# Patient Record
Sex: MALE | Race: WHITE | NOT HISPANIC OR LATINO | Employment: FULL TIME | RURAL
[De-identification: names, ages, dates, MRNs, and addresses within clinical notes are randomized per-mention and may not be internally consistent; named-entity substitution may affect disease eponyms.]

---

## 2017-04-07 ENCOUNTER — HOSPITAL ENCOUNTER (OUTPATIENT)
Dept: PHYSICAL THERAPY | Facility: HOSPITAL | Age: 47
Setting detail: RECURRING SERIES
Discharge: HOME | End: 2017-05-17
Attending: FAMILY MEDICINE | Admitting: FAMILY MEDICINE

## 2017-09-01 ENCOUNTER — HOSPITAL ENCOUNTER (OUTPATIENT)
Dept: CT IMAGING | Facility: HOSPITAL | Age: 47
Discharge: HOME | End: 2017-09-01
Attending: SURGERY | Admitting: SURGERY

## 2017-09-06 ENCOUNTER — HOSPITAL ENCOUNTER (OUTPATIENT)
Dept: PREADMISSION TESTING | Facility: HOSPITAL | Age: 47
Discharge: HOME | End: 2017-09-06
Attending: SURGERY | Admitting: SURGERY

## 2017-09-14 ENCOUNTER — HOSPITAL ENCOUNTER (OUTPATIENT)
Dept: PREOP | Facility: HOSPITAL | Age: 47
Setting detail: HOSPITAL OUTPATIENT SURGERY
Discharge: HOME | End: 2017-09-14
Attending: SURGERY | Admitting: SURGERY

## 2017-10-24 ENCOUNTER — HOSPITAL ENCOUNTER (OUTPATIENT)
Dept: ULTRASOUND IMAGING | Facility: HOSPITAL | Age: 47
Discharge: HOME | End: 2017-10-24
Attending: SURGERY | Admitting: SURGERY

## 2018-01-29 ENCOUNTER — CONVERSION ENCOUNTER (OUTPATIENT)
Dept: ORTHOPEDIC SURGERY | Facility: CLINIC | Age: 48
End: 2018-01-29

## 2018-02-14 ENCOUNTER — HOSPITAL ENCOUNTER (OUTPATIENT)
Dept: CT IMAGING | Facility: HOSPITAL | Age: 48
Discharge: HOME | End: 2018-02-14
Attending: SURGERY | Admitting: SURGERY

## 2018-02-28 ENCOUNTER — HOSPITAL ENCOUNTER (OUTPATIENT)
Dept: PREADMISSION TESTING | Facility: HOSPITAL | Age: 48
Discharge: HOME | End: 2018-02-28
Attending: SURGERY | Admitting: SURGERY

## 2018-02-28 LAB
ANION GAP SERPL CALC-SCNC: 11.1 MMOL/L (ref 10–20)
BASOPHILS # BLD AUTO: 0.1 10*3/UL (ref 0–0.2)
BASOPHILS NFR BLD AUTO: 1 % (ref 0–2)
BUN SERPL-MCNC: 14 MG/DL (ref 8–20)
BUN/CREAT SERPL: 17.5 (ref 6.2–20.3)
CALCIUM SERPL-MCNC: 9.3 MG/DL (ref 8.9–10.3)
CHLORIDE SERPL-SCNC: 100 MMOL/L (ref 101–111)
CONV CO2: 27 MMOL/L (ref 22–32)
CREAT UR-MCNC: 0.8 MG/DL (ref 0.7–1.2)
DIFFERENTIAL METHOD BLD: (no result)
EOSINOPHIL # BLD AUTO: 0.1 10*3/UL (ref 0–0.3)
EOSINOPHIL # BLD AUTO: 2 % (ref 0–3)
ERYTHROCYTE [DISTWIDTH] IN BLOOD BY AUTOMATED COUNT: 12.9 % (ref 11.5–14.5)
GLUCOSE SERPL-MCNC: 137 MG/DL (ref 65–99)
HCT VFR BLD AUTO: 44.7 % (ref 40–54)
HGB BLD-MCNC: 15.8 G/DL (ref 14–18)
LYMPHOCYTES # BLD AUTO: 2.3 10*3/UL (ref 0.8–4.8)
LYMPHOCYTES NFR BLD AUTO: 35 % (ref 18–42)
MCH RBC QN AUTO: 32.7 PG (ref 26–32)
MCHC RBC AUTO-ENTMCNC: 35.5 G/DL (ref 32–36)
MCV RBC AUTO: 92.2 FL (ref 80–94)
MONOCYTES # BLD AUTO: 0.7 10*3/UL (ref 0.1–1.3)
MONOCYTES NFR BLD AUTO: 11 % (ref 2–11)
NEUTROPHILS # BLD AUTO: 3.4 10*3/UL (ref 2.3–8.6)
NEUTROPHILS NFR BLD AUTO: 51 % (ref 50–75)
NRBC BLD AUTO-RTO: 0 /100{WBCS}
NRBC/RBC NFR BLD MANUAL: 0 10*3/UL
PLATELET # BLD AUTO: 142 10*3/UL (ref 150–450)
PMV BLD AUTO: 8.7 FL (ref 7.4–10.4)
POTASSIUM SERPL-SCNC: ABNORMAL MMOL/L (ref 3.6–5.1)
RBC # BLD AUTO: 4.84 10*6/UL (ref 4.6–6)
SODIUM SERPL-SCNC: 134 MMOL/L (ref 136–144)
WBC # BLD AUTO: 6.6 10*3/UL (ref 4.5–11.5)

## 2018-03-12 ENCOUNTER — HOSPITAL ENCOUNTER (OUTPATIENT)
Dept: OTHER | Facility: HOSPITAL | Age: 48
Discharge: HOME | End: 2018-03-12
Attending: SURGERY | Admitting: SURGERY

## 2018-07-06 ENCOUNTER — HOSPITAL ENCOUNTER (OUTPATIENT)
Dept: CT IMAGING | Facility: HOSPITAL | Age: 48
Discharge: HOME | End: 2018-07-06
Attending: SURGERY | Admitting: SURGERY

## 2018-08-29 ENCOUNTER — HOSPITAL ENCOUNTER (OUTPATIENT)
Dept: ORTHOPEDIC SURGERY | Facility: CLINIC | Age: 48
Discharge: HOME | End: 2018-08-29
Attending: PHYSICIAN ASSISTANT | Admitting: PHYSICIAN ASSISTANT

## 2018-08-31 ENCOUNTER — HOSPITAL ENCOUNTER (OUTPATIENT)
Dept: MRI IMAGING | Facility: HOSPITAL | Age: 48
Discharge: HOME | End: 2018-08-31
Attending: PHYSICIAN ASSISTANT | Admitting: PHYSICIAN ASSISTANT

## 2018-11-15 ENCOUNTER — HOSPITAL ENCOUNTER (OUTPATIENT)
Dept: ULTRASOUND IMAGING | Facility: HOSPITAL | Age: 48
Discharge: HOME | End: 2018-11-15
Attending: FAMILY MEDICINE | Admitting: FAMILY MEDICINE

## 2018-11-15 ENCOUNTER — HOSPITAL ENCOUNTER (OUTPATIENT)
Dept: FAMILY MEDICINE CLINIC | Facility: CLINIC | Age: 48
Setting detail: SPECIMEN
Discharge: HOME | End: 2018-11-15
Attending: FAMILY MEDICINE | Admitting: FAMILY MEDICINE

## 2018-11-15 LAB
ALBUMIN SERPL-MCNC: 4.2 G/DL (ref 3.5–4.8)
ALBUMIN/GLOB SERPL: 1.2 {RATIO} (ref 1–1.7)
ALP SERPL-CCNC: 63 IU/L (ref 32–91)
ALT SERPL-CCNC: 40 IU/L (ref 17–63)
ANION GAP SERPL CALC-SCNC: 14.2 MMOL/L (ref 10–20)
AST SERPL-CCNC: 40 IU/L (ref 15–41)
BILIRUB SERPL-MCNC: 0.9 MG/DL (ref 0.3–1.2)
BUN SERPL-MCNC: 11 MG/DL (ref 8–20)
BUN/CREAT SERPL: 13.8 (ref 6.2–20.3)
CALCIUM SERPL-MCNC: 9.7 MG/DL (ref 8.9–10.3)
CHLORIDE SERPL-SCNC: 99 MMOL/L (ref 101–111)
CONV ABS BANDS: 1.6 10*3/UL
CONV CO2: 25 MMOL/L (ref 22–32)
CONV TOTAL PROTEIN: 7.7 G/DL (ref 6.1–7.9)
CREAT UR-MCNC: 0.8 MG/DL (ref 0.7–1.2)
DIFFERENTIAL METHOD BLD: (no result)
ERYTHROCYTE [DISTWIDTH] IN BLOOD BY AUTOMATED COUNT: 12.6 % (ref 11.5–14.5)
GLOBULIN UR ELPH-MCNC: 3.5 G/DL (ref 2.5–3.8)
GLUCOSE SERPL-MCNC: 97 MG/DL (ref 65–99)
HCT VFR BLD AUTO: 44.7 % (ref 40–54)
HGB BLD-MCNC: 15.4 G/DL (ref 14–18)
LIPASE SERPL-CCNC: 25 U/L (ref 22–51)
LYMPHOCYTES # BLD AUTO: 2.8 10*3/UL (ref 0.8–4.8)
LYMPHOCYTES NFR BLD AUTO: 24 % (ref 18–42)
MCH RBC QN AUTO: 31.7 PG (ref 26–32)
MCHC RBC AUTO-ENTMCNC: 34.4 G/DL (ref 32–36)
MCV RBC AUTO: 92 FL (ref 80–94)
MONOCYTES # BLD AUTO: 0.8 10*3/UL (ref 0.1–1.3)
MONOCYTES NFR BLD AUTO: 7 % (ref 2–11)
NEUTROPHILS # BLD AUTO: 6.3 10*3/UL (ref 2.3–8.6)
NEUTROPHILS NFR BLD AUTO: 55 % (ref 50–75)
NEUTS BAND NFR BLD MANUAL: 14 % (ref 0–5)
PLATELET # BLD AUTO: 167 10*3/UL (ref 150–450)
PMV BLD AUTO: 10.1 FL (ref 7.4–10.4)
POTASSIUM SERPL-SCNC: 4.2 MMOL/L (ref 3.6–5.1)
RBC # BLD AUTO: 4.86 10*6/UL (ref 4.6–6)
SODIUM SERPL-SCNC: 134 MMOL/L (ref 136–144)
WBC # BLD AUTO: 11.5 10*3/UL (ref 4.5–11.5)

## 2018-12-17 ENCOUNTER — HOSPITAL ENCOUNTER (OUTPATIENT)
Dept: ORTHOPEDIC SURGERY | Facility: CLINIC | Age: 48
Discharge: HOME | End: 2018-12-17
Attending: PHYSICIAN ASSISTANT | Admitting: PHYSICIAN ASSISTANT

## 2019-01-16 ENCOUNTER — HOSPITAL ENCOUNTER (OUTPATIENT)
Dept: ORTHOPEDIC SURGERY | Facility: CLINIC | Age: 49
Discharge: HOME | End: 2019-01-16
Attending: ORTHOPAEDIC SURGERY | Admitting: ORTHOPAEDIC SURGERY

## 2019-03-26 ENCOUNTER — HOSPITAL ENCOUNTER (OUTPATIENT)
Dept: ORTHOPEDIC SURGERY | Facility: CLINIC | Age: 49
Discharge: HOME | End: 2019-03-26
Attending: ORTHOPAEDIC SURGERY | Admitting: ORTHOPAEDIC SURGERY

## 2019-09-17 ENCOUNTER — OFFICE VISIT (OUTPATIENT)
Dept: ORTHOPEDIC SURGERY | Facility: CLINIC | Age: 49
End: 2019-09-17

## 2019-09-17 VITALS
SYSTOLIC BLOOD PRESSURE: 133 MMHG | HEART RATE: 88 BPM | WEIGHT: 297.2 LBS | BODY MASS INDEX: 40.26 KG/M2 | HEIGHT: 72 IN | DIASTOLIC BLOOD PRESSURE: 81 MMHG

## 2019-09-17 DIAGNOSIS — M48.061 SPINAL STENOSIS, LUMBAR REGION, WITHOUT NEUROGENIC CLAUDICATION: Primary | ICD-10-CM

## 2019-09-17 DIAGNOSIS — M51.36 DDD (DEGENERATIVE DISC DISEASE), LUMBAR: ICD-10-CM

## 2019-09-17 PROCEDURE — 99213 OFFICE O/P EST LOW 20 MIN: CPT | Performed by: ORTHOPAEDIC SURGERY

## 2019-09-17 RX ORDER — BACLOFEN 10 MG/1
10 TABLET ORAL 2 TIMES DAILY
Qty: 60 TABLET | Refills: 0 | Status: SHIPPED | OUTPATIENT
Start: 2019-09-17 | End: 2019-09-24 | Stop reason: SDUPTHER

## 2019-09-17 RX ORDER — RANITIDINE 150 MG/1
1 CAPSULE ORAL EVERY 12 HOURS
COMMUNITY
Start: 2018-03-06 | End: 2020-01-03 | Stop reason: SDUPTHER

## 2019-09-17 RX ORDER — LISINOPRIL 10 MG/1
TABLET ORAL
Refills: 0 | COMMUNITY
Start: 2019-07-31 | End: 2020-02-10

## 2019-09-17 NOTE — PROGRESS NOTES
Visit Type: Follow Up  Referring Provider: No ref. provider found  Primary Care Provider: Billy Aldrich MD    Patient Name: Qasim Ch  Patient Age: 49 y.o.  Chief Complaint: had concerns including Follow-up of the Lumbar Spine (6 mon f/u).    Subjective   History of Present Illness: This patient is a pleasant 49-year-old gentleman status post L3-L5 fusion 10 months ago patient is complaining of axial lower back pain with radiation of pain to his right buttock patient denies any pain below his knees      Review of Systems   Musculoskeletal: Positive for back pain and myalgias.       The following portions of the patient's history were reviewed and updated as appropriate: allergies, current medications, past family history, past medical history, past social history, past surgical history and problem list.    Past Medical History:   Diagnosis Date   • Colon polyps    • Diverticulitis    • Fatty liver    • GERD (gastroesophageal reflux disease)    • Hyperlipidemia    • Hypertension    • Obesity    • WOLF (obstructive sleep apnea)    • Spondylolisthesis        Past Surgical History:   Procedure Laterality Date   • COLON SURGERY  2016    sigmoid   • HERNIA REPAIR  2017    lap left inguinal hernia repair, ventral hernia repair   • LUMBAR FUSION  2018    TLIF L3-L5 --Dr Jauregui   • TONSILLECTOMY         Vitals:    19 0935   BP: 133/81   Pulse: 88       There is no problem list on file for this patient.      Family History Summary:  family history is not on file.    Social History     Socioeconomic History   • Marital status:      Spouse name: Not on file   • Number of children: Not on file   • Years of education: Not on file   • Highest education level: Not on file   Tobacco Use   • Smoking status: Former Smoker     Packs/day: 0.50     Types: Cigarettes     Last attempt to quit: 2012     Years since quittin.7   Substance and Sexual Activity   • Alcohol use: Yes     Frequency:  Monthly or less     Drinks per session: 1 or 2     Binge frequency: Never   • Drug use: No   • Sexual activity: Defer         Current Outpatient Medications:   •  ACETAMINOPHEN EXTRA STRENGTH PO, TYLENOL CAPS, Disp: , Rfl:   •  lisinopril (PRINIVIL,ZESTRIL) 10 MG tablet, , Disp: , Rfl: 0  •  ranitidine (ZANTAC) 150 MG capsule, 1 capsule Every 12 (Twelve) Hours., Disp: , Rfl:   •  baclofen (LIORESAL) 10 MG tablet, Take 1 tablet by mouth 2 (Two) Times a Day., Disp: 60 tablet, Rfl: 0  •  diclofenac sodium (VOTAREN XR) 100 MG 24 hr tablet, Take 1 tablet by mouth Daily., Disp: 30 tablet, Rfl: 11    Allergies   Allergen Reactions   • Penicillin G Anaphylaxis           Objective   Physical Exam  Back Exam     Tenderness   The patient is experiencing tenderness in the lumbar.    Range of Motion   Extension:  50 abnormal   Flexion:  50 abnormal   Lateral bend right:  30 abnormal   Lateral bend left:  30 abnormal   Rotation right: 20   Rotation left: 20     Muscle Strength   The patient has normal back strength.    Reflexes   Patellar: normal  Achilles: normal  Biceps: normal    Other   Toe walk: normal  Heel walk: normal  Sensation: normal  Gait: normal               Impression and Plan: This patient is here today for follow-up patient is a status post L3-L5 transforaminal interbody fusion 10 months ago patient is having some residual axial lower back pain with radiation of pain to his right buttock my exam is negative for sacroiliac joint and hip pathology I am since patient obtain a CT scan of lumbar spine and I am going to give him Voltaren 100 mg daily and baclofen 10 mg twice daily would like to see this patient in my office after he is done with his CT scan.    Spinal stenosis, lumbar region, without neurogenic claudication [M48.061]     Orders Placed This Encounter   Procedures   • XR Spine Lumbar AP & Lateral     Scheduling Instructions:       [id number]      Lspine ap and lat      Low back pain, post op  12/3/2018 TLIF L3-L5     Order Specific Question:   Reason for Exam:     Answer:   low back pain   • CT Lumbar Spine Without Contrast     Standing Status:   Future     Standing Expiration Date:   9/17/2020               Art Jauregui MD  09/17/19  11:11 AM

## 2019-09-20 ENCOUNTER — HOSPITAL ENCOUNTER (OUTPATIENT)
Dept: CT IMAGING | Facility: HOSPITAL | Age: 49
Discharge: HOME | End: 2019-09-20
Admitting: ORTHOPAEDIC SURGERY

## 2019-09-20 DIAGNOSIS — M51.36 DDD (DEGENERATIVE DISC DISEASE), LUMBAR: ICD-10-CM

## 2019-09-20 DIAGNOSIS — M48.061 SPINAL STENOSIS, LUMBAR REGION, WITHOUT NEUROGENIC CLAUDICATION: ICD-10-CM

## 2019-09-20 PROCEDURE — 72131 CT LUMBAR SPINE W/O DYE: CPT

## 2019-09-24 ENCOUNTER — OFFICE VISIT (OUTPATIENT)
Dept: ORTHOPEDIC SURGERY | Facility: CLINIC | Age: 49
End: 2019-09-24

## 2019-09-24 VITALS
DIASTOLIC BLOOD PRESSURE: 86 MMHG | BODY MASS INDEX: 39.02 KG/M2 | HEIGHT: 73 IN | WEIGHT: 294.4 LBS | HEART RATE: 71 BPM | SYSTOLIC BLOOD PRESSURE: 132 MMHG

## 2019-09-24 DIAGNOSIS — M48.061 SPINAL STENOSIS, LUMBAR REGION, WITHOUT NEUROGENIC CLAUDICATION: Primary | ICD-10-CM

## 2019-09-24 DIAGNOSIS — Q76.49 SACRALIZATION: ICD-10-CM

## 2019-09-24 PROCEDURE — 99213 OFFICE O/P EST LOW 20 MIN: CPT | Performed by: PHYSICIAN ASSISTANT

## 2019-09-24 RX ORDER — BACLOFEN 10 MG/1
10 TABLET ORAL 2 TIMES DAILY
Qty: 60 TABLET | Refills: 3 | Status: SHIPPED | OUTPATIENT
Start: 2019-09-24 | End: 2020-01-03 | Stop reason: SDUPTHER

## 2019-09-26 NOTE — PROGRESS NOTES
"Orthopedic Spine Post Operative Note      Primary Care Provider: Billy Aldrich MD    Patient Name: Qasim Ch  Patient Age: 49 y.o.  Chief Complaint: had concerns including Follow-up and Pain of the Lumbar Spine.    History of Present Illness: Qasim Ch is a 49 y.o. year old male here 10 months status post tlif l3-l5. Doing well compared to pre op symptoms but still having some cramping and discomfort in right leg. Had a ct done for further eval here to review..    Imaging:  My interpretation of ct scan is solid fusioinl3-l5. sacrilization of l5 tp on S1.    Assessment: No diagnosis found.    Plan:  Refill baclofen. F/u in 3 months    Objective   /86 (BP Location: Left arm, Patient Position: Sitting, Cuff Size: Large Adult)   Pulse 71   Ht 185.4 cm (73\")   Wt 134 kg (294 lb 6.4 oz)   BMI 38.84 kg/m²   Physical Exam  CONSTITUTIONAL: well nourished, well-developed, alert, oriented, in no acute distress   COMMUNICATION AND VOICE: able to communicate normally, normal voice quality  HEAD: normocephalic, atraumatic, no tenderness,  FACE: appearance normal,  facial motion symmetric  CHEST/RESPIRATORY: normal respiratory effort   CARDIOVASCULAR: no cyanosis or edema   SKIN: Incision is well healed.no edema, induration, fluctuance  MUSCULOSKELETAL: body movement grossly normal, normal gait and station NEUROLOGICAL/PSYCHIATRIC: oriented appropriately for age, mood normal, affect appropriate      No diagnosis found.    No orders of the defined types were placed in this encounter.           "

## 2019-11-05 ENCOUNTER — TELEPHONE (OUTPATIENT)
Dept: FAMILY MEDICINE CLINIC | Facility: CLINIC | Age: 49
End: 2019-11-05

## 2019-11-05 DIAGNOSIS — Z00.00 ENCOUNTER FOR BLOOD TEST FOR ROUTINE GENERAL PHYSICAL EXAMINATION: Primary | ICD-10-CM

## 2019-11-05 DIAGNOSIS — Z12.5 PROSTATE CANCER SCREENING: ICD-10-CM

## 2019-11-05 NOTE — TELEPHONE ENCOUNTER
Pt is coming in fasting on 12/27/19 for fasting labs for physical. Please put lab order in. Thank you.

## 2019-11-08 ENCOUNTER — TELEPHONE (OUTPATIENT)
Dept: FAMILY MEDICINE CLINIC | Facility: CLINIC | Age: 49
End: 2019-11-08

## 2019-11-08 NOTE — TELEPHONE ENCOUNTER
Patient called stating that his blood sugar has been running high. Patient states that at the highest it was 290. Patient states that he found an old script for metformin and has been taking it. Patient states that today BS is 130. Patient states that he is almost out of the metformin, and is asking if he needs to come in for blood work or what would you suggest.

## 2019-11-10 NOTE — TELEPHONE ENCOUNTER
Get the dose of the metformen and we will calll it in  Tell him would want to see him in 3 months fasting and can do a long range sugar and other blood then, if his sugars are not doing better with trhe metformin, can see sooner

## 2019-11-14 NOTE — TELEPHONE ENCOUNTER
Patient called back stating that he has been taking METFORMIN 1000 MG bid. 1 in the morning and 1 night.

## 2019-11-26 ENCOUNTER — TELEPHONE (OUTPATIENT)
Dept: FAMILY MEDICINE CLINIC | Facility: CLINIC | Age: 49
End: 2019-11-26

## 2019-11-26 RX ORDER — OSELTAMIVIR PHOSPHATE 75 MG/1
75 CAPSULE ORAL DAILY
Qty: 10 CAPSULE | Refills: 0 | Status: SHIPPED | OUTPATIENT
Start: 2019-11-26 | End: 2019-12-06

## 2019-12-27 ENCOUNTER — LAB (OUTPATIENT)
Dept: FAMILY MEDICINE CLINIC | Facility: CLINIC | Age: 49
End: 2019-12-27

## 2019-12-27 DIAGNOSIS — Z00.00 ENCOUNTER FOR BLOOD TEST FOR ROUTINE GENERAL PHYSICAL EXAMINATION: ICD-10-CM

## 2019-12-27 DIAGNOSIS — Z12.5 PROSTATE CANCER SCREENING: ICD-10-CM

## 2019-12-27 LAB
ALBUMIN SERPL-MCNC: 4 G/DL (ref 3.5–5.2)
ALBUMIN/GLOB SERPL: 1.2 G/DL
ALP SERPL-CCNC: 46 U/L (ref 39–117)
ALT SERPL W P-5'-P-CCNC: 68 U/L (ref 1–41)
ANION GAP SERPL CALCULATED.3IONS-SCNC: 12.6 MMOL/L (ref 5–15)
ARTICHOKE IGE QN: 85 MG/DL (ref 0–100)
AST SERPL-CCNC: 58 U/L (ref 1–40)
BASOPHILS # BLD AUTO: 0.04 10*3/MM3 (ref 0–0.2)
BASOPHILS NFR BLD AUTO: 0.7 % (ref 0–1.5)
BILIRUB SERPL-MCNC: 0.4 MG/DL (ref 0.2–1.2)
BILIRUB UR QL STRIP: NEGATIVE
BUN BLD-MCNC: 20 MG/DL (ref 6–20)
BUN/CREAT SERPL: 22.7 (ref 7–25)
CALCIUM SPEC-SCNC: 9.4 MG/DL (ref 8.6–10.5)
CHLORIDE SERPL-SCNC: 100 MMOL/L (ref 98–107)
CHOLEST SERPL-MCNC: 179 MG/DL (ref 0–200)
CLARITY UR: CLEAR
CO2 SERPL-SCNC: 25.4 MMOL/L (ref 22–29)
COLOR UR: YELLOW
CREAT BLD-MCNC: 0.88 MG/DL (ref 0.76–1.27)
DEPRECATED RDW RBC AUTO: 43.7 FL (ref 37–54)
EOSINOPHIL # BLD AUTO: 0.07 10*3/MM3 (ref 0–0.4)
EOSINOPHIL NFR BLD AUTO: 1.2 % (ref 0.3–6.2)
ERYTHROCYTE [DISTWIDTH] IN BLOOD BY AUTOMATED COUNT: 13 % (ref 12.3–15.4)
GFR SERPL CREATININE-BSD FRML MDRD: 93 ML/MIN/1.73
GLOBULIN UR ELPH-MCNC: 3.4 GM/DL
GLUCOSE BLD-MCNC: 133 MG/DL (ref 65–99)
GLUCOSE UR STRIP-MCNC: NEGATIVE MG/DL
HCT VFR BLD AUTO: 39.7 % (ref 37.5–51)
HDLC SERPL-MCNC: 23 MG/DL (ref 40–60)
HGB BLD-MCNC: 13.9 G/DL (ref 13–17.7)
HGB UR QL STRIP.AUTO: NEGATIVE
IMM GRANULOCYTES # BLD AUTO: 0.02 10*3/MM3 (ref 0–0.05)
IMM GRANULOCYTES NFR BLD AUTO: 0.3 % (ref 0–0.5)
KETONES UR QL STRIP: NEGATIVE
LDLC SERPL CALC-MCNC: ABNORMAL MG/DL
LDLC/HDLC SERPL: ABNORMAL {RATIO}
LEUKOCYTE ESTERASE UR QL STRIP.AUTO: NEGATIVE
LYMPHOCYTES # BLD AUTO: 2.25 10*3/MM3 (ref 0.7–3.1)
LYMPHOCYTES NFR BLD AUTO: 38.9 % (ref 19.6–45.3)
MCH RBC QN AUTO: 32 PG (ref 26.6–33)
MCHC RBC AUTO-ENTMCNC: 35 G/DL (ref 31.5–35.7)
MCV RBC AUTO: 91.3 FL (ref 79–97)
MONOCYTES # BLD AUTO: 0.5 10*3/MM3 (ref 0.1–0.9)
MONOCYTES NFR BLD AUTO: 8.6 % (ref 5–12)
NEUTROPHILS # BLD AUTO: 2.91 10*3/MM3 (ref 1.7–7)
NEUTROPHILS NFR BLD AUTO: 50.3 % (ref 42.7–76)
NITRITE UR QL STRIP: NEGATIVE
NRBC BLD AUTO-RTO: 0 /100 WBC (ref 0–0.2)
PH UR STRIP.AUTO: 5.5 [PH] (ref 5–8)
PLATELET # BLD AUTO: 141 10*3/MM3 (ref 140–450)
PMV BLD AUTO: 11.6 FL (ref 6–12)
POTASSIUM BLD-SCNC: 4.3 MMOL/L (ref 3.5–5.2)
PROT SERPL-MCNC: 7.4 G/DL (ref 6–8.5)
PROT UR QL STRIP: NEGATIVE
PSA SERPL-MCNC: 0.49 NG/ML (ref 0–4)
RBC # BLD AUTO: 4.35 10*6/MM3 (ref 4.14–5.8)
SODIUM BLD-SCNC: 138 MMOL/L (ref 136–145)
SP GR UR STRIP: 1.02 (ref 1–1.03)
TRIGL SERPL-MCNC: 453 MG/DL (ref 0–150)
TSH SERPL DL<=0.05 MIU/L-ACNC: 0.43 UIU/ML (ref 0.27–4.2)
UROBILINOGEN UR QL STRIP: NORMAL
VLDLC SERPL-MCNC: ABNORMAL MG/DL
WBC NRBC COR # BLD: 5.79 10*3/MM3 (ref 3.4–10.8)

## 2019-12-27 PROCEDURE — 36415 COLL VENOUS BLD VENIPUNCTURE: CPT

## 2019-12-27 PROCEDURE — 85025 COMPLETE CBC W/AUTO DIFF WBC: CPT | Performed by: FAMILY MEDICINE

## 2019-12-27 PROCEDURE — 80061 LIPID PANEL: CPT | Performed by: FAMILY MEDICINE

## 2019-12-27 PROCEDURE — 81003 URINALYSIS AUTO W/O SCOPE: CPT | Performed by: FAMILY MEDICINE

## 2019-12-27 PROCEDURE — G0103 PSA SCREENING: HCPCS | Performed by: FAMILY MEDICINE

## 2019-12-27 PROCEDURE — 83721 ASSAY OF BLOOD LIPOPROTEIN: CPT

## 2019-12-27 PROCEDURE — 80053 COMPREHEN METABOLIC PANEL: CPT | Performed by: FAMILY MEDICINE

## 2019-12-27 PROCEDURE — 84443 ASSAY THYROID STIM HORMONE: CPT | Performed by: FAMILY MEDICINE

## 2020-01-03 ENCOUNTER — OFFICE VISIT (OUTPATIENT)
Dept: FAMILY MEDICINE CLINIC | Facility: CLINIC | Age: 50
End: 2020-01-03

## 2020-01-03 VITALS
WEIGHT: 301 LBS | BODY MASS INDEX: 39.71 KG/M2 | SYSTOLIC BLOOD PRESSURE: 120 MMHG | RESPIRATION RATE: 12 BRPM | HEART RATE: 103 BPM | DIASTOLIC BLOOD PRESSURE: 75 MMHG

## 2020-01-03 DIAGNOSIS — Z00.00 PREVENTATIVE HEALTH CARE: Primary | ICD-10-CM

## 2020-01-03 DIAGNOSIS — Z12.11 COLON CANCER SCREENING: ICD-10-CM

## 2020-01-03 DIAGNOSIS — M54.16 LUMBAR RADICULOPATHY: ICD-10-CM

## 2020-01-03 PROBLEM — K43.9 VENTRAL HERNIA WITHOUT OBSTRUCTION OR GANGRENE: Status: ACTIVE | Noted: 2018-02-14

## 2020-01-03 PROBLEM — M48.061 SPINAL STENOSIS OF LUMBAR REGION WITHOUT NEUROGENIC CLAUDICATION: Status: ACTIVE | Noted: 2018-09-05

## 2020-01-03 PROBLEM — E66.9 OBESITY: Status: ACTIVE | Noted: 2017-03-07

## 2020-01-03 PROBLEM — G47.33 OBSTRUCTIVE SLEEP APNEA SYNDROME: Status: ACTIVE | Noted: 2018-11-09

## 2020-01-03 PROCEDURE — 99396 PREV VISIT EST AGE 40-64: CPT | Performed by: FAMILY MEDICINE

## 2020-01-03 PROCEDURE — 83036 HEMOGLOBIN GLYCOSYLATED A1C: CPT | Performed by: FAMILY MEDICINE

## 2020-01-03 RX ORDER — RANITIDINE 150 MG/1
150 CAPSULE ORAL 2 TIMES DAILY
Qty: 180 CAPSULE | Refills: 3 | Status: SHIPPED | OUTPATIENT
Start: 2020-01-03 | End: 2020-05-05

## 2020-01-03 RX ORDER — BACLOFEN 10 MG/1
10 TABLET ORAL 2 TIMES DAILY
Qty: 60 TABLET | Refills: 11 | Status: SHIPPED | OUTPATIENT
Start: 2020-01-03 | End: 2020-09-02

## 2020-01-03 NOTE — PROGRESS NOTES
Rooming Tab(CC,VS,Pt Hx,Fall Screen)  Chief Complaint   Patient presents with   • Annual Exam       Subjective 49-year-old here for physical.  The patient is not exercising because he had fusion of L3-L4 and L5.  He continues to have low back pain going into his left leg which started after he actually was postop and was doing some kind of stretching.  Overall he is better however, prior to surgery his whole left leg was numb, now he has numbness in the second and third toe on the left foot and apparently the second toe on the right.  He was following Dr. Jauregui but now will be having to see someone else.  The patient is due for a colonoscopy.  He denies any issues with his bowel or bladder function.  He has no blood in stool or urine.  He has not been able to lose weight because he is not able to exercise and is not eating properly.  She has no complaints of chest pain dizziness or syncope.  He has no BARRY.    I have reviewed and updated his medications, medical history and problem list during today's office visit.     Patient Care Team:  Billy Aldrich MD as PCP - General    Problem List Tab  Medications Tab  Synopsis Tab  Chart Review Tab  Care Everywhere Tab  Immunizations Tab  Patient History Tab    Social History     Tobacco Use   • Smoking status: Former Smoker     Packs/day: 0.50     Years: 10.00     Pack years: 5.00     Types: Cigarettes     Last attempt to quit: 2012     Years since quittin.0   • Smokeless tobacco: Current User     Types: Snuff   • Tobacco comment: quit    Substance Use Topics   • Alcohol use: Yes     Frequency: Monthly or less     Drinks per session: 1 or 2     Binge frequency: Never       Review of Systems   Constitutional: Negative for chills, fatigue and fever.   HENT: Negative for nosebleeds.    Eyes: Negative for double vision.   Respiratory: Negative for chest tightness and shortness of breath.    Cardiovascular: Negative for chest pain and palpitations.   Gastrointestinal:  Negative for blood in stool.   Genitourinary: Negative for dysuria and hematuria.   Musculoskeletal: Positive for back pain.   Neurological: Negative for dizziness and syncope.   Psychiatric/Behavioral: Negative for depressed mood.       Objective     Rooming Tab(CC,VS,Pt Hx,Fall Screen)  /75   Pulse 103   Resp 12   Wt (!) 137 kg (301 lb)   BMI 39.71 kg/m²     Body mass index is 39.71 kg/m².    Physical Exam   Constitutional: He is oriented to person, place, and time. He appears well-developed and well-nourished. No distress.   Obese pleasant male who is no acute distress   HENT:   Head: Normocephalic and atraumatic.   Nose: Nose normal.   Mouth/Throat: Oropharynx is clear and moist.   Eyes: Pupils are equal, round, and reactive to light. Conjunctivae, EOM and lids are normal.   Neck: Trachea normal and normal range of motion. Neck supple. No JVD present. Carotid bruit is not present. No thyroid mass and no thyromegaly present.   No carotid bruits   Cardiovascular: Normal rate, regular rhythm, normal heart sounds and intact distal pulses.   Pulmonary/Chest: Effort normal and breath sounds normal.   Abdominal: Soft. Bowel sounds are normal. He exhibits no distension and no mass. There is no tenderness.   Obese   Genitourinary: Rectum normal and prostate normal.   Musculoskeletal:   No c/c/e   Neurological: He is alert and oriented to person, place, and time. No cranial nerve deficit.   Skin: Skin is warm and dry.   Psychiatric: He has a normal mood and affect. His speech is normal and behavior is normal. He is attentive.   Nursing note and vitals reviewed.       Statin Choice Calculator  Data Reviewed:               Lab Results   Component Value Date    BUN 20 12/27/2019    CREATININE 0.88 12/27/2019    EGFRIFNONA 93 12/27/2019     12/27/2019    K 4.3 12/27/2019     12/27/2019    CALCIUM 9.4 12/27/2019    ALBUMIN 4.00 12/27/2019    BILITOT 0.4 12/27/2019    ALKPHOS 46 12/27/2019    AST 58 (H)  12/27/2019    ALT 68 (H) 12/27/2019    TRIG 453 (H) 12/27/2019    HDL 23 (L) 12/27/2019    VLDL  12/27/2019      Comment:      Unable to calculate    LDL  12/27/2019      Comment:      Unable to calculate    LDL 85 12/27/2019    LDLHDL  12/27/2019      Comment:      Unable to calculate    WBC 5.79 12/27/2019    RBC 4.35 12/27/2019    HCT 39.7 12/27/2019    MCV 91.3 12/27/2019    MCH 32.0 12/27/2019    TSH 0.429 12/27/2019    PSA 0.492 12/27/2019      Assessment/Plan   Order Review Tab  Health Maintenance Tab  Patient Plan/Order Tab  Diagnoses and all orders for this visit:    1. Preventative health care (Primary)  Assessment & Plan:  Patient needs to start some type of regular aerobic exercise for cardiovascular health and weight loss.  Also muscle toning and core strengthening would be beneficial.  Nutritious diet high in fiber and high in vegetable with low carbohydrate and eating leaner cuts of meat and increased amounts of fish.  Immunizations were discussed with the patient.  Not chewing tobacco was discussed with the patient and his the detrimental effects on his health.    Orders:  -     Hemoglobin A1c    2. Lumbar radiculopathy  -     MRI Lumbar Spine Without Contrast; Future  -     Ambulatory Referral to Spine Surgery    3. Colon cancer screening  -     Ambulatory Referral For Screening Colonoscopy    Other orders  -     baclofen (LIORESAL) 10 MG tablet; Take 1 tablet by mouth 2 (Two) Times a Day.  Dispense: 60 tablet; Refill: 11  -     raNITIdine (ZANTAC) 150 MG capsule; Take 1 capsule by mouth 2 (Two) Times a Day.  Dispense: 180 capsule; Refill: 3      Wrapup Tab  Return in about 4 months (around 5/3/2020) for Recheck.

## 2020-01-04 LAB — HBA1C MFR BLD: 6.5 % (ref 3.5–5.6)

## 2020-01-06 NOTE — PATIENT INSTRUCTIONS
Mediterranean Diet  A Mediterranean diet refers to food and lifestyle choices that are based on the traditions of countries located on the [place]. This way of eating has been shown to help prevent certain conditions and improve outcomes for people who have chronic diseases, like kidney disease and heart disease.  What are tips for following this plan?  Lifestyle  · Cook and eat meals together with your family, when possible.  · Drink enough fluid to keep your urine clear or pale yellow.  · Be physically active every day. This includes:  ? Aerobic exercise like running or swimming.  ? Leisure activities like gardening, walking, or housework.  · Get 7-8 hours of sleep each night.  · If recommended by your health care provider, drink red wine in moderation. This means 1 glass a day for nonpregnant women and 2 glasses a day for men. A glass of wine equals 5 oz (150 mL).  Reading food labels    · Check the serving size of packaged foods. For foods such as rice and pasta, the serving size refers to the amount of cooked product, not dry.  · Check the total fat in packaged foods. Avoid foods that have saturated fat or trans fats.  · Check the ingredients list for added sugars, such as corn syrup.  Shopping  · At the grocery store, buy most of your food from the areas near the walls of the store. This includes:  ? Fresh fruits and vegetables (produce).  ? Grains, beans, nuts, and seeds. Some of these may be available in unpackaged forms or large amounts (in bulk).  ? Fresh seafood.  ? Poultry and eggs.  ? Low-fat dairy products.  · Buy whole ingredients instead of prepackaged foods.  · Buy fresh fruits and vegetables in-season from local farmers markets.  · Buy frozen fruits and vegetables in resealable bags.  · If you do not have access to quality fresh seafood, buy precooked frozen shrimp or canned fish, such as tuna, salmon, or sardines.  · Buy small amounts of raw or cooked vegetables, salads, or olives from  the deli or salad bar at your store.  · Stock your pantry so you always have certain foods on hand, such as olive oil, canned tuna, canned tomatoes, rice, pasta, and beans.  Cooking  · Cook foods with extra-virgin olive oil instead of using butter or other vegetable oils.  · Have meat as a side dish, and have vegetables or grains as your main dish. This means having meat in small portions or adding small amounts of meat to foods like pasta or stew.  · Use beans or vegetables instead of meat in common dishes like chili or lasagna.  · [place] with different cooking methods. Try roasting or broiling vegetables instead of steaming or sautéeing them.  · Add frozen vegetables to soups, stews, pasta, or rice.  · Add nuts or seeds for added healthy fat at each meal. You can add these to yogurt, salads, or vegetable dishes.  · Marinate fish or vegetables using olive oil, lemon juice, garlic, and fresh herbs.  Meal planning    · Plan to eat 1 vegetarian meal one day each week. Try to work up to 2 vegetarian meals, if possible.  · Eat seafood 2 or more times a week.  · Have healthy snacks readily available, such as:  ? Vegetable sticks with hummus.  ? Greek yogurt.  ? Fruit and nut trail mix.  · Eat balanced meals throughout the week. This includes:  ? Fruit: 2-3 servings a day  ? Vegetables: 4-5 servings a day  ? Low-fat dairy: 2 servings a day  ? Fish, poultry, or lean meat: 1 serving a day  ? Beans and legumes: 2 or more servings a week  ? Nuts and seeds: 1-2 servings a day  ? Whole grains: 6-8 servings a day  ? Extra-virgin olive oil: 3-4 servings a day  · Limit red meat and sweets to only a few servings a month  What are my food choices?  · Mediterranean diet  ? Recommended  ? Grains: Whole-grain pasta. Brown rice. Bulgar wheat. Polenta. Couscous. Whole-wheat bread. Oatmeal. Quinoa.  ? Vegetables: Artichokes. Beets. Broccoli. Cabbage. Carrots. Eggplant. Green beans. Chard. Kale. Spinach. Onions. Leeks. Peas. Squash.  Tomatoes. Peppers. Radishes.  ? Fruits: Apples. Apricots. Avocado. Berries. Bananas. Cherries. Dates. Figs. Grapes. Di. Melon. Oranges. Peaches. Plums. Pomegranate.  ? Meats and other protein foods: Beans. Almonds. Sunflower seeds. Pine nuts. Peanuts. Cod. [place]. Scallops. Shrimp. Tuna. Tilapia. Clams. Oysters. Eggs.  ? Dairy: Low-fat milk. Cheese. Greek yogurt.  ? Beverages: Water. Red wine. Herbal tea.  ? Fats and oils: Extra virgin olive oil. Avocado oil. Grape seed oil.  ? Sweets and desserts: Greek yogurt with honey. Baked apples. Poached pears. Trail mix.  ? Seasoning and other foods: Basil. Cilantro. Coriander. Cumin. Mint. Parsley. [place]. Rosemary. Tarragon. Garlic. Oregano. Thyme. Pepper. Balsalmic vinegar. Tahini. Hummus. Tomato sauce. Olives. Mushrooms.  ? Limit these  ? Grains: Prepackaged pasta or rice dishes. Prepackaged cereal with added sugar.  ? Vegetables: Deep fried potatoes (french fries).  ? Fruits: Fruit canned in syrup.  ? Meats and other protein foods: Beef. Pork. Lamb. Poultry with skin. Hot dogs. [place].  ? Dairy: Ice cream. Sour cream. Whole milk.  ? Beverages: Juice. Sugar-sweetened soft drinks. Beer. Liquor and spirits.  ? Fats and oils: Butter. Canola oil. Vegetable oil. Beef fat (tallow). Lard.  ? Sweets and desserts: Cookies. Cakes. Pies. Candy.  ? Seasoning and other foods: Mayonnaise. Premade sauces and marinades.  ? The items listed may not be a complete list. Talk with your dietitian about what dietary choices are right for you.  Summary  · The Mediterranean diet includes both food and lifestyle choices.  · Eat a variety of fresh fruits and vegetables, beans, nuts, seeds, and whole grains.  · Limit the amount of red meat and sweets that you eat.  · Talk with your health care provider about whether it is safe for you to drink red wine in moderation. This means 1 glass a day for nonpregnant women and 2 glasses a day for men. A glass of wine equals 5 oz (150 mL).  This information  is not intended to replace advice given to you by your health care provider. Make sure you discuss any questions you have with your health care provider.  Document Released: 08/10/2017 Document Revised: 09/12/2017 Document Reviewed: 08/10/2017  [employer] Interactive Patient Education © 2019 [employer]      Exercising to Stay Healthy  To become healthy and stay healthy, it is recommended that you do moderate-intensity and vigorous-intensity exercise. You can tell that you are exercising at a moderate intensity if your heart starts beating faster and you start breathing faster but can still hold a conversation. You can tell that you are exercising at a vigorous intensity if you are breathing much harder and faster and cannot hold a conversation while exercising.  Exercising regularly is important. It has many health benefits, such as:  · Improving overall fitness, flexibility, and endurance.  · Increasing bone density.  · Helping with weight control.  · Decreasing body fat.  · Increasing muscle strength.  · Reducing stress and tension.  · Improving overall health.  How often should I exercise?  Choose an activity that you enjoy, and set realistic goals. Your health care provider can help you make an activity plan that works for you.  Exercise regularly as told by your health care provider. This may include:  · Doing strength training two times a week, such as:  ? Lifting weights.  ? Using resistance bands.  ? Push-ups.  ? Sit-ups.  ? Yoga.  · Doing a certain intensity of exercise for a given amount of time. Choose from these options:  ? A total of 150 minutes of moderate-intensity exercise every week.  ? A total of 75 minutes of vigorous-intensity exercise every week.  ? A mix of moderate-intensity and vigorous-intensity exercise every week.  Children, pregnant women, people who have not exercised regularly, people who are overweight, and older adults may need to talk with a health care provider about what  activities are safe to do. If you have a medical condition, be sure to talk with your health care provider before you start a new exercise program.  What are some exercise ideas?  Moderate-intensity exercise ideas include:  · Walking 1 mile (1.6 km) in about 15 minutes.  · Biking.  · Hiking.  · Golfing.  · Dancing.  · Water aerobics.  Vigorous-intensity exercise ideas include:  · Walking 4.5 miles (7.2 km) or more in about 1 hour.  · Jogging or running 5 miles (8 km) in about 1 hour.  · Biking 10 miles (16.1 km) or more in about 1 hour.  · Lap swimming.  · Roller-skating or in-line skating.  · Cross-country skiing.  · Vigorous competitive sports, such as football, basketball, and soccer.  · Jumping rope.  · Aerobic dancing.  What are some everyday activities that can help me to get exercise?  · Yard work, such as:  ? Pushing a .  ? Raking and bagging leaves.  · Washing your car.  · Pushing a stroller.  · Shoveling snow.  · Gardening.  · Washing windows or floors.  How can I be more active in my day-to-day activities?  · Use stairs instead of an elevator.  · Take a walk during your lunch break.  · If you drive, park your car farther away from your work or school.  · If you take public transportation, get off one stop early and walk the rest of the way.  · Stand up or walk around during all of your indoor phone calls.  · Get up, stretch, and walk around every 30 minutes throughout the day.  · Enjoy exercise with a friend. Support to continue exercising will help you keep a regular routine of activity.  What guidelines can I follow while exercising?  · Before you start a new exercise program, talk with your health care provider.  · Do not exercise so much that you hurt yourself, feel dizzy, or get very short of breath.  · Wear comfortable clothes and wear shoes with good support.  · Drink plenty of water while you exercise to prevent dehydration or heat stroke.  · Work out until your breathing and your  heartbeat get faster.  Where to find more information  · [place] Department of Health and Human Services: www.hhs.gov  · Centers for Disease Control and Prevention (CDC): www.cdc.gov  Summary  · Exercising regularly is important. It will improve your overall fitness, flexibility, and endurance.  · Regular exercise also will improve your overall health. It can help you control your weight, reduce stress, and improve your bone density.  · Do not exercise so much that you hurt yourself, feel dizzy, or get very short of breath.  · Before you start a new exercise program, talk with your health care provider.  This information is not intended to replace advice given to you by your health care provider. Make sure you discuss any questions you have with your health care provider.  Document Released: 01/20/2012 Document Revised: 11/08/2018 Document Reviewed: 11/08/2018  [employer] Interactive Patient Education © 2019 [employer]

## 2020-01-06 NOTE — PROGRESS NOTES
Patient was notified and he wanted to let you know that stroke and heart disease does run in the family and said that if you wanted to put him on cholesterol medication that he is fine with it.

## 2020-01-06 NOTE — ASSESSMENT & PLAN NOTE
Patient needs to start some type of regular aerobic exercise for cardiovascular health and weight loss.  Also muscle toning and core strengthening would be beneficial.  Nutritious diet high in fiber and high in vegetable with low carbohydrate and eating leaner cuts of meat and increased amounts of fish.  Immunizations were discussed with the patient.  Not chewing tobacco was discussed with the patient and his the detrimental effects on his health.

## 2020-01-14 DIAGNOSIS — M54.16 LUMBAR RADICULOPATHY: ICD-10-CM

## 2020-02-10 RX ORDER — LISINOPRIL 10 MG/1
TABLET ORAL
Qty: 90 TABLET | Refills: 3 | Status: SHIPPED | OUTPATIENT
Start: 2020-02-10 | End: 2021-04-20 | Stop reason: SDUPTHER

## 2020-05-05 ENCOUNTER — OFFICE VISIT (OUTPATIENT)
Dept: FAMILY MEDICINE CLINIC | Facility: CLINIC | Age: 50
End: 2020-05-05

## 2020-05-05 DIAGNOSIS — I10 ESSENTIAL HYPERTENSION: ICD-10-CM

## 2020-05-05 DIAGNOSIS — E11.9 TYPE 2 DIABETES MELLITUS WITHOUT COMPLICATION, WITHOUT LONG-TERM CURRENT USE OF INSULIN (HCC): Primary | ICD-10-CM

## 2020-05-05 DIAGNOSIS — K21.9 GASTROESOPHAGEAL REFLUX DISEASE WITHOUT ESOPHAGITIS: ICD-10-CM

## 2020-05-05 PROCEDURE — 99442 PR PHYS/QHP TELEPHONE EVALUATION 11-20 MIN: CPT | Performed by: FAMILY MEDICINE

## 2020-05-05 RX ORDER — FAMOTIDINE 20 MG/1
20 TABLET, FILM COATED ORAL 2 TIMES DAILY
Qty: 180 TABLET | Refills: 3 | Status: SHIPPED | OUTPATIENT
Start: 2020-05-05 | End: 2020-11-25

## 2020-05-05 NOTE — ASSESSMENT & PLAN NOTE
Diabetes is improving with treatment.   Continue current treatment regimen.  Reminded to bring in blood sugar diary at next visit.  Dietary recommendations for ADA diet.  Regular aerobic exercise.  Discussed ways to avoid symptomatic hypoglycemia.  Discussed foot care.  Diabetes will be reassessed 4 months fasting.

## 2020-05-05 NOTE — ASSESSMENT & PLAN NOTE
Hypertension is improving with treatment.  Continue current treatment regimen.  Dietary sodium restriction.  Weight loss.  Regular aerobic exercise.  Continue current medications.  Blood pressure will be reassessed 4 months fasting.

## 2020-05-05 NOTE — PROGRESS NOTES
You have chosen to receive care through a telephone visit. Do you consent to use a telephone visit for your medical care today? Yes    Rooming Tab(CC,VS,Pt Hx,Fall Screen)  Chief Complaint   Patient presents with   • Diabetes   • Hypertension       Subjective 49-year-old here for a telephone visit secondary to COVID-19.  Patient has a history of diabetes mellitus and is checking his blood sugars daily and its run in the 120 range.  He has no hypoglycemia.  He is trying to get some exercise and trying to eat right and he did lose 10 pounds recently.  He checks his feet daily and sees the ophthalmologist yearly.  He has no hypoglycemia.  Patient has had quite a bit of low back pain but got a steroid injection which seemed to help.  He is supposed to get another one tomorrow and feels like it is beneficial.  Patient has hypertension and he has not checked his blood pressure.  He denies any chest pain dizziness or syncope.  Patient has a history of GERD and recently he is not been able to get his Zantac because of the shortage.  He is needing something to replace this.  He is going to run out pretty soon    I have reviewed and updated his medications, medical history and problem list during today's office visit.     Patient Care Team:  Billy Aldrich MD as PCP - General    Problem List Tab  Medications Tab  Synopsis Tab  Chart Review Tab  Care Everywhere Tab  Immunizations Tab  Patient History Tab    Social History     Tobacco Use   • Smoking status: Former Smoker     Packs/day: 0.50     Years: 10.00     Pack years: 5.00     Types: Cigarettes     Last attempt to quit: 2012     Years since quittin.3   • Smokeless tobacco: Current User     Types: Snuff   • Tobacco comment: quit    Substance Use Topics   • Alcohol use: Yes     Frequency: Monthly or less     Drinks per session: 1 or 2     Binge frequency: Never       Review of Systems   Constitutional: Negative for chills and fever.   HENT: Negative for nosebleeds.     Respiratory: Negative for chest tightness and shortness of breath.    Cardiovascular: Negative for chest pain and palpitations.   Gastrointestinal: Negative for blood in stool.   Musculoskeletal: Positive for back pain.   Neurological: Negative for dizziness and syncope.       Objective     Rooming Tab(CC,VS,Pt Hx,Fall Screen)  There were no vitals taken for this visit.    There is no height or weight on file to calculate BMI.    Physical Exam     Statin Choice Calculator  Data Reviewed:                   Assessment/Plan   Order Review Tab  Health Maintenance Tab  Patient Plan/Order Tab  Diagnoses and all orders for this visit:    1. Type 2 diabetes mellitus without complication, without long-term current use of insulin (CMS/[facility]) (Primary)  Assessment & Plan:  Diabetes is improving with treatment.   Continue current treatment regimen.  Reminded to bring in blood sugar diary at next visit.  Dietary recommendations for ADA diet.  Regular aerobic exercise.  Discussed ways to avoid symptomatic hypoglycemia.  Discussed foot care.  Diabetes will be reassessed 4 months fasting.      2. Essential hypertension  Assessment & Plan:  Hypertension is improving with treatment.  Continue current treatment regimen.  Dietary sodium restriction.  Weight loss.  Regular aerobic exercise.  Continue current medications.  Blood pressure will be reassessed 4 months fasting.      3. Gastroesophageal reflux disease without esophagitis  Assessment & Plan:  Discontinue Zantac as the patient is not able to get either way.  Start Pepcid 20 mg twice daily      Other orders  -     famotidine (Pepcid) 20 MG tablet; Take 1 tablet by mouth 2 (Two) Times a Day.  Dispense: 180 tablet; Refill: 3      Wrapup Tab  Return in about 3 months (around 8/5/2020) for Recheck.         11 minutes spent on telephone visit

## 2020-05-07 ENCOUNTER — TELEPHONE (OUTPATIENT)
Dept: FAMILY MEDICINE CLINIC | Facility: CLINIC | Age: 50
End: 2020-05-07

## 2020-05-07 NOTE — TELEPHONE ENCOUNTER
----- Message from Billy Aldrich MD sent at 5/5/2020  4:02 PM EDT -----  Schedule the patient a follow-up appointment in 3 months fasting

## 2020-05-07 NOTE — TELEPHONE ENCOUNTER
You had sent me two messages on this patient.  One to see in 4 months and this one to see in 3 months.  Which would you prefer?

## 2020-09-02 ENCOUNTER — OFFICE VISIT (OUTPATIENT)
Dept: FAMILY MEDICINE CLINIC | Facility: CLINIC | Age: 50
End: 2020-09-02

## 2020-09-02 ENCOUNTER — LAB (OUTPATIENT)
Dept: FAMILY MEDICINE CLINIC | Facility: CLINIC | Age: 50
End: 2020-09-02

## 2020-09-02 DIAGNOSIS — E78.1 HYPERTRIGLYCERIDEMIA: ICD-10-CM

## 2020-09-02 DIAGNOSIS — I10 ESSENTIAL HYPERTENSION: ICD-10-CM

## 2020-09-02 DIAGNOSIS — E11.9 TYPE 2 DIABETES MELLITUS WITHOUT COMPLICATION, WITHOUT LONG-TERM CURRENT USE OF INSULIN (HCC): Primary | ICD-10-CM

## 2020-09-02 DIAGNOSIS — D75.1 POLYCYTHEMIA: ICD-10-CM

## 2020-09-02 LAB
ALBUMIN SERPL-MCNC: 4.3 G/DL (ref 3.5–5.2)
ALBUMIN UR-MCNC: 3.4 MG/DL
ALBUMIN/GLOB SERPL: 1.3 G/DL
ALP SERPL-CCNC: 56 U/L (ref 39–117)
ALT SERPL W P-5'-P-CCNC: 61 U/L (ref 1–41)
ANION GAP SERPL CALCULATED.3IONS-SCNC: 9.1 MMOL/L (ref 5–15)
ARTICHOKE IGE QN: 87 MG/DL (ref 0–100)
AST SERPL-CCNC: 56 U/L (ref 1–40)
BASOPHILS # BLD AUTO: 0.03 10*3/MM3 (ref 0–0.2)
BASOPHILS NFR BLD AUTO: 0.4 % (ref 0–1.5)
BILIRUB SERPL-MCNC: 0.5 MG/DL (ref 0–1.2)
BUN SERPL-MCNC: 19 MG/DL (ref 6–20)
BUN/CREAT SERPL: 19.4 (ref 7–25)
CALCIUM SPEC-SCNC: 10.1 MG/DL (ref 8.6–10.5)
CHLORIDE SERPL-SCNC: 101 MMOL/L (ref 98–107)
CHOLEST SERPL-MCNC: 177 MG/DL (ref 0–200)
CO2 SERPL-SCNC: 25.9 MMOL/L (ref 22–29)
CREAT SERPL-MCNC: 0.98 MG/DL (ref 0.76–1.27)
CREAT UR-MCNC: 350.5 MG/DL
DEPRECATED RDW RBC AUTO: 43.7 FL (ref 37–54)
EOSINOPHIL # BLD AUTO: 0.17 10*3/MM3 (ref 0–0.4)
EOSINOPHIL NFR BLD AUTO: 2.5 % (ref 0.3–6.2)
ERYTHROCYTE [DISTWIDTH] IN BLOOD BY AUTOMATED COUNT: 12.4 % (ref 12.3–15.4)
GFR SERPL CREATININE-BSD FRML MDRD: 82 ML/MIN/1.73
GLOBULIN UR ELPH-MCNC: 3.3 GM/DL
GLUCOSE SERPL-MCNC: 103 MG/DL (ref 65–99)
HBA1C MFR BLD: 5.7 % (ref 3.5–5.6)
HCT VFR BLD AUTO: 45.4 % (ref 37.5–51)
HDLC SERPL-MCNC: 25 MG/DL (ref 40–60)
HGB BLD-MCNC: 15.4 G/DL (ref 13–17.7)
IMM GRANULOCYTES # BLD AUTO: 0.02 10*3/MM3 (ref 0–0.05)
IMM GRANULOCYTES NFR BLD AUTO: 0.3 % (ref 0–0.5)
LDLC SERPL CALC-MCNC: ABNORMAL MG/DL
LDLC/HDLC SERPL: ABNORMAL {RATIO}
LYMPHOCYTES # BLD AUTO: 2.11 10*3/MM3 (ref 0.7–3.1)
LYMPHOCYTES NFR BLD AUTO: 30.6 % (ref 19.6–45.3)
MCH RBC QN AUTO: 32.5 PG (ref 26.6–33)
MCHC RBC AUTO-ENTMCNC: 33.9 G/DL (ref 31.5–35.7)
MCV RBC AUTO: 95.8 FL (ref 79–97)
MICROALBUMIN/CREAT UR: 9.7 MG/G
MONOCYTES # BLD AUTO: 0.68 10*3/MM3 (ref 0.1–0.9)
MONOCYTES NFR BLD AUTO: 9.9 % (ref 5–12)
NEUTROPHILS NFR BLD AUTO: 3.89 10*3/MM3 (ref 1.7–7)
NEUTROPHILS NFR BLD AUTO: 56.3 % (ref 42.7–76)
NRBC BLD AUTO-RTO: 0 /100 WBC (ref 0–0.2)
PLATELET # BLD AUTO: 137 10*3/MM3 (ref 140–450)
PMV BLD AUTO: 11.6 FL (ref 6–12)
POTASSIUM SERPL-SCNC: 4.5 MMOL/L (ref 3.5–5.2)
PROT SERPL-MCNC: 7.6 G/DL (ref 6–8.5)
RBC # BLD AUTO: 4.74 10*6/MM3 (ref 4.14–5.8)
SODIUM SERPL-SCNC: 136 MMOL/L (ref 136–145)
TRIGL SERPL-MCNC: 430 MG/DL (ref 0–150)
VLDLC SERPL-MCNC: ABNORMAL MG/DL
WBC # BLD AUTO: 6.9 10*3/MM3 (ref 3.4–10.8)

## 2020-09-02 PROCEDURE — 99213 OFFICE O/P EST LOW 20 MIN: CPT | Performed by: FAMILY MEDICINE

## 2020-09-02 PROCEDURE — 83721 ASSAY OF BLOOD LIPOPROTEIN: CPT | Performed by: FAMILY MEDICINE

## 2020-09-02 PROCEDURE — 85025 COMPLETE CBC W/AUTO DIFF WBC: CPT | Performed by: FAMILY MEDICINE

## 2020-09-02 PROCEDURE — 80053 COMPREHEN METABOLIC PANEL: CPT | Performed by: FAMILY MEDICINE

## 2020-09-02 PROCEDURE — 82570 ASSAY OF URINE CREATININE: CPT | Performed by: FAMILY MEDICINE

## 2020-09-02 PROCEDURE — 82043 UR ALBUMIN QUANTITATIVE: CPT | Performed by: FAMILY MEDICINE

## 2020-09-02 PROCEDURE — 83036 HEMOGLOBIN GLYCOSYLATED A1C: CPT | Performed by: FAMILY MEDICINE

## 2020-09-02 PROCEDURE — 80061 LIPID PANEL: CPT | Performed by: FAMILY MEDICINE

## 2020-09-02 RX ORDER — GEMFIBROZIL 600 MG/1
600 TABLET, FILM COATED ORAL
Qty: 60 TABLET | Refills: 11 | Status: SHIPPED | OUTPATIENT
Start: 2020-09-02 | End: 2021-03-22 | Stop reason: ALTCHOICE

## 2020-09-02 RX ORDER — TIZANIDINE 4 MG/1
TABLET ORAL
Qty: 60 TABLET | Refills: 6 | Status: SHIPPED | OUTPATIENT
Start: 2020-09-02 | End: 2021-08-16 | Stop reason: SDUPTHER

## 2020-09-02 NOTE — PROGRESS NOTES
Rooming Tab(CC,VS,Pt Hx,Fall Screen)  Chief Complaint   Patient presents with   • Diabetes   • Hypertension       Subjective 50-year-old here for follow-up of his diabetes.  The patient checks his blood sugars periodically and they really run about 100 or better.  He has had no hypoglycemic events and he takes his metformin to thousand milligrams twice daily without problem.  He tries to eat better but his weight has not changed much.  He is not getting much in the way of exercise but he is active.  Patient had back surgery in the past saw Dr. Mei continues to have pain down his left leg.  He had 4 injections which helped a lot.  He did not go back to see Dr. mei because he was not available, so another physician of whom he cannot remember the name now and I do not have any records.  Either way his back is better although he does complaining complain of cramping in his left leg every night that goes into his foot and toe.  Is quite serious and despite baclofen, he continues to have the cramping.  He has tried 2 baclofen's, 20 mg, and this does not help either.  He drinks plenty of water and.    I have reviewed and updated his medications, medical history and problem list during today's office visit.     Patient Care Team:  Billy Aldrich MD as PCP - General    Problem List Tab  Medications Tab  Synopsis Tab  Chart Review Tab  Care Everywhere Tab  Immunizations Tab  Patient History Tab    Social History     Tobacco Use   • Smoking status: Former Smoker     Packs/day: 0.50     Years: 10.00     Pack years: 5.00     Types: Cigarettes     Last attempt to quit:      Years since quittin.6   • Smokeless tobacco: Current User     Types: Snuff   • Tobacco comment: quit    Substance Use Topics   • Alcohol use: Yes     Frequency: Monthly or less     Drinks per session: 1 or 2     Binge frequency: Never       Review of Systems   Constitutional: Negative for chills, fatigue and fever.   HENT: Negative for nosebleeds.     Eyes: Negative for double vision.   Respiratory: Negative for chest tightness and shortness of breath.    Cardiovascular: Negative for chest pain and palpitations.   Gastrointestinal: Negative for blood in stool.   Genitourinary: Negative for dysuria and hematuria.   Musculoskeletal:        Leg cramps   Neurological: Negative for dizziness and syncope.   Psychiatric/Behavioral: Negative for depressed mood.       Objective     Rooming Tab(CC,VS,Pt Hx,Fall Screen)  /86   Pulse 90   Temp 96.9 °F (36.1 °C)   Resp 12   Wt 133 kg (292 lb 3.2 oz)   BMI 38.55 kg/m²     Body mass index is 38.55 kg/m².    Physical Exam   Constitutional: He is oriented to person, place, and time. He appears well-developed and well-nourished. No distress.   Obese pleasant no acute distress   HENT:   Head: Normocephalic and atraumatic.   Nose: Nose normal.   Mouth/Throat: Oropharynx is clear and moist.   Eyes: Pupils are equal, round, and reactive to light. Conjunctivae, EOM and lids are normal.   Neck: Trachea normal and normal range of motion. Neck supple. No JVD present. Carotid bruit is not present. No thyroid mass and no thyromegaly present.   No carotid bruits   Cardiovascular: Normal rate, regular rhythm, normal heart sounds and intact distal pulses.   Pulmonary/Chest: Effort normal and breath sounds normal.   Musculoskeletal:   No c/c/e  Sensation intact, DP pulses palpable, no diabetic foot ulcers or skin breakdown   Neurological: He is alert and oriented to person, place, and time. No cranial nerve deficit.   Skin: Skin is warm and dry.   Psychiatric: He has a normal mood and affect. His speech is normal and behavior is normal. He is attentive.   Nursing note and vitals reviewed.       Statin Choice Calculator  Data Reviewed:               Lab Results   Component Value Date    BUN 19 09/02/2020    CREATININE 0.98 09/02/2020    EGFRIFNONA 82 09/02/2020     09/02/2020    K 4.5 09/02/2020     09/02/2020     CALCIUM 10.1 09/02/2020    ALBUMIN 4.30 09/02/2020    BILITOT 0.5 09/02/2020    ALKPHOS 56 09/02/2020    AST 56 (H) 09/02/2020    ALT 61 (H) 09/02/2020    TRIG 430 (H) 09/02/2020    HDL 25 (L) 09/02/2020    VLDL  09/02/2020      Comment:      Unable to calculate    LDL  09/02/2020      Comment:      Unable to calculate    LDL 87 09/02/2020    LDLHDL  09/02/2020      Comment:      Unable to calculate    MICROALBUR 3.4 09/02/2020    WBC 6.90 09/02/2020    RBC 4.74 09/02/2020    HCT 45.4 09/02/2020    MCV 95.8 09/02/2020    MCH 32.5 09/02/2020      Assessment/Plan   Order Review Tab  Health Maintenance Tab  Patient Plan/Order Tab  Diagnoses and all orders for this visit:    1. Type 2 diabetes mellitus without complication, without long-term current use of insulin (CMS/[facility]) (Primary)  Assessment & Plan:  Diabetes is improving with treatment.   Continue current treatment regimen.  Reminded to bring in blood sugar diary at next visit.  Dietary recommendations for ADA diet.  Regular aerobic exercise.  Discussed ways to avoid symptomatic hypoglycemia.  Discussed foot care.  Diabetes will be reassessed in 6 months.    Orders:  -     Microalbumin / Creatinine Urine Ratio - Urine, Clean Catch  -     Comprehensive Metabolic Panel  -     Hemoglobin A1c    2. Essential hypertension  Assessment & Plan:  Hypertension is improving with treatment.  Continue current treatment regimen.  Dietary sodium restriction.  Weight loss.  Regular aerobic exercise.  Continue current medications.  Blood pressure will be reassessed at the next regular appointment.      3. Hypertriglyceridemia  Assessment & Plan:  Lipid abnormalities are unchanged.  Pharmacotherapy as ordered.  Lipids will be reassessed 4 months.    Orders:  -     Lipid Panel  -     LDL Cholesterol, Direct; Future  -     LDL Cholesterol, Direct    4. Polycythemia  Assessment & Plan:  His hemoglobin is better at 15.4.  We will continue to monitor over time    Orders:  -     CBC &  Differential  -     CBC Auto Differential      Wrapup Tab  Return in about 6 months (around 3/2/2021) for Annual physical.

## 2020-09-03 VITALS
DIASTOLIC BLOOD PRESSURE: 86 MMHG | BODY MASS INDEX: 38.55 KG/M2 | TEMPERATURE: 96.9 F | SYSTOLIC BLOOD PRESSURE: 130 MMHG | RESPIRATION RATE: 12 BRPM | WEIGHT: 292.2 LBS | HEART RATE: 90 BPM

## 2020-09-03 NOTE — ASSESSMENT & PLAN NOTE
Diabetes is improving with treatment.   Continue current treatment regimen.  Reminded to bring in blood sugar diary at next visit.  Dietary recommendations for ADA diet.  Regular aerobic exercise.  Discussed ways to avoid symptomatic hypoglycemia.  Discussed foot care.  Diabetes will be reassessed in 6 months.

## 2020-09-03 NOTE — ASSESSMENT & PLAN NOTE
Lipid abnormalities are unchanged.  Pharmacotherapy as ordered.  Lipids will be reassessed 4 months.

## 2020-10-22 ENCOUNTER — TELEPHONE (OUTPATIENT)
Dept: FAMILY MEDICINE CLINIC | Facility: CLINIC | Age: 50
End: 2020-10-22

## 2020-10-22 NOTE — TELEPHONE ENCOUNTER
PATIENT SAID HE HAS HIS YEARLY APPT WITH DR. CHA BACK IN MARCH AND WAS GIVEN A REFERRAL TO HAVE A COLONOSCOPY IN [place]. PATIENT WOULD LIKE TO GO TO THE DOCTOR HE WENT TO 4-5 YEARS AGO FOR HIS LAST COLONOSCOPY, BUT HE DOES NOT REMEMBER THE DOCTOR'S NAME. HE WANTS TO KNOW IF WE HAVE THAT INFORMATION ON FILE AND IF DR. CHA WILL GIVE HIM A NEW REFERRAL. PLEASE ADVISE.       PATIENT CALL BACK: [phone]

## 2020-10-23 DIAGNOSIS — Z12.11 COLON CANCER SCREENING: Primary | ICD-10-CM

## 2020-11-24 ENCOUNTER — TELEPHONE (OUTPATIENT)
Dept: FAMILY MEDICINE CLINIC | Facility: CLINIC | Age: 50
End: 2020-11-24

## 2020-11-24 NOTE — TELEPHONE ENCOUNTER
PATIENT CALLED AGAIN WANTING TO ADD THAT HE HAS HAD PART OF HIS COLON REMOVED AND HAD DIVERTICULITIS BEFORE SO I WORRIED IT COULD BE RELATED.

## 2020-11-24 NOTE — TELEPHONE ENCOUNTER
PATIENT IS  WANTING TO KNOW WHAT TO DO. HE IS HAVING CHILLS/ AND HOT FLASHES AND DIARRHEA AND ABD PAIN. HE IS ASKING TO BE SEEN OR WANTS TO KNOW WHAT TO DO.    [phone]

## 2020-11-25 ENCOUNTER — OFFICE VISIT (OUTPATIENT)
Dept: FAMILY MEDICINE CLINIC | Facility: CLINIC | Age: 50
End: 2020-11-25

## 2020-11-25 ENCOUNTER — LAB (OUTPATIENT)
Dept: FAMILY MEDICINE CLINIC | Facility: CLINIC | Age: 50
End: 2020-11-25

## 2020-11-25 ENCOUNTER — TELEPHONE (OUTPATIENT)
Dept: FAMILY MEDICINE CLINIC | Facility: CLINIC | Age: 50
End: 2020-11-25

## 2020-11-25 VITALS
TEMPERATURE: 97.5 F | RESPIRATION RATE: 12 BRPM | DIASTOLIC BLOOD PRESSURE: 87 MMHG | BODY MASS INDEX: 36.89 KG/M2 | WEIGHT: 279.6 LBS | SYSTOLIC BLOOD PRESSURE: 124 MMHG | HEART RATE: 98 BPM

## 2020-11-25 DIAGNOSIS — R10.13 EPIGASTRIC PAIN: Primary | ICD-10-CM

## 2020-11-25 LAB
ALBUMIN SERPL-MCNC: 4.6 G/DL (ref 3.5–5.2)
ALBUMIN/GLOB SERPL: 1.2 G/DL
ALP SERPL-CCNC: 64 U/L (ref 39–117)
ALT SERPL W P-5'-P-CCNC: 31 U/L (ref 1–41)
ANION GAP SERPL CALCULATED.3IONS-SCNC: 10.5 MMOL/L (ref 5–15)
AST SERPL-CCNC: 43 U/L (ref 1–40)
BASOPHILS # BLD AUTO: 0.04 10*3/MM3 (ref 0–0.2)
BASOPHILS NFR BLD AUTO: 0.6 % (ref 0–1.5)
BILIRUB SERPL-MCNC: 0.5 MG/DL (ref 0–1.2)
BUN SERPL-MCNC: 14 MG/DL (ref 6–20)
BUN/CREAT SERPL: 18.2 (ref 7–25)
CALCIUM SPEC-SCNC: 10.1 MG/DL (ref 8.6–10.5)
CHLORIDE SERPL-SCNC: 100 MMOL/L (ref 98–107)
CO2 SERPL-SCNC: 25.5 MMOL/L (ref 22–29)
CREAT SERPL-MCNC: 0.77 MG/DL (ref 0.76–1.27)
DEPRECATED RDW RBC AUTO: 40.7 FL (ref 37–54)
EOSINOPHIL # BLD AUTO: 0.14 10*3/MM3 (ref 0–0.4)
EOSINOPHIL NFR BLD AUTO: 2 % (ref 0.3–6.2)
ERYTHROCYTE [DISTWIDTH] IN BLOOD BY AUTOMATED COUNT: 11.9 % (ref 12.3–15.4)
GFR SERPL CREATININE-BSD FRML MDRD: 108 ML/MIN/1.73
GLOBULIN UR ELPH-MCNC: 3.8 GM/DL
GLUCOSE SERPL-MCNC: 121 MG/DL (ref 65–99)
HCT VFR BLD AUTO: 44.3 % (ref 37.5–51)
HGB BLD-MCNC: 15.3 G/DL (ref 13–17.7)
IMM GRANULOCYTES # BLD AUTO: 0.03 10*3/MM3 (ref 0–0.05)
IMM GRANULOCYTES NFR BLD AUTO: 0.4 % (ref 0–0.5)
LIPASE SERPL-CCNC: 39 U/L (ref 13–60)
LYMPHOCYTES # BLD AUTO: 1.87 10*3/MM3 (ref 0.7–3.1)
LYMPHOCYTES NFR BLD AUTO: 27.2 % (ref 19.6–45.3)
MCH RBC QN AUTO: 31.9 PG (ref 26.6–33)
MCHC RBC AUTO-ENTMCNC: 34.5 G/DL (ref 31.5–35.7)
MCV RBC AUTO: 92.5 FL (ref 79–97)
MONOCYTES # BLD AUTO: 0.72 10*3/MM3 (ref 0.1–0.9)
MONOCYTES NFR BLD AUTO: 10.5 % (ref 5–12)
NEUTROPHILS NFR BLD AUTO: 4.07 10*3/MM3 (ref 1.7–7)
NEUTROPHILS NFR BLD AUTO: 59.3 % (ref 42.7–76)
NRBC BLD AUTO-RTO: 0 /100 WBC (ref 0–0.2)
PLATELET # BLD AUTO: 233 10*3/MM3 (ref 140–450)
PMV BLD AUTO: 11 FL (ref 6–12)
POTASSIUM SERPL-SCNC: 4.5 MMOL/L (ref 3.5–5.2)
PROT SERPL-MCNC: 8.4 G/DL (ref 6–8.5)
RBC # BLD AUTO: 4.79 10*6/MM3 (ref 4.14–5.8)
SODIUM SERPL-SCNC: 136 MMOL/L (ref 136–145)
WBC # BLD AUTO: 6.87 10*3/MM3 (ref 3.4–10.8)

## 2020-11-25 PROCEDURE — 85025 COMPLETE CBC W/AUTO DIFF WBC: CPT | Performed by: FAMILY MEDICINE

## 2020-11-25 PROCEDURE — 83690 ASSAY OF LIPASE: CPT | Performed by: FAMILY MEDICINE

## 2020-11-25 PROCEDURE — 99213 OFFICE O/P EST LOW 20 MIN: CPT | Performed by: FAMILY MEDICINE

## 2020-11-25 PROCEDURE — 80053 COMPREHEN METABOLIC PANEL: CPT | Performed by: FAMILY MEDICINE

## 2020-11-25 RX ORDER — PANTOPRAZOLE SODIUM 40 MG/1
40 TABLET, DELAYED RELEASE ORAL DAILY
Qty: 90 TABLET | Refills: 1 | Status: SHIPPED | OUTPATIENT
Start: 2020-11-25

## 2020-11-25 RX ORDER — HYDROCODONE BITARTRATE AND ACETAMINOPHEN 7.5; 325 MG/1; MG/1
1 TABLET ORAL EVERY 4 HOURS PRN
Qty: 20 TABLET | Refills: 0 | Status: SHIPPED | OUTPATIENT
Start: 2020-11-25 | End: 2020-11-25 | Stop reason: SDUPTHER

## 2020-11-25 RX ORDER — PANTOPRAZOLE SODIUM 40 MG/1
40 TABLET, DELAYED RELEASE ORAL DAILY
Qty: 30 TABLET | Refills: 1 | Status: SHIPPED | OUTPATIENT
Start: 2020-11-25 | End: 2020-11-25

## 2020-11-25 RX ORDER — HYDROCODONE BITARTRATE AND ACETAMINOPHEN 7.5; 325 MG/1; MG/1
1 TABLET ORAL EVERY 4 HOURS PRN
Qty: 20 TABLET | Refills: 0 | Status: SHIPPED | OUTPATIENT
Start: 2020-11-25 | End: 2021-01-28 | Stop reason: HOSPADM

## 2020-11-25 NOTE — PROGRESS NOTES
Rooming Tab(CC,VS,Pt Hx,Fall Screen)  Chief Complaint   Patient presents with   • Chills   • Hot Flashes   • Abdominal Pain   • Diarrhea       Subjective Patient is here complaining of about a week ago developed abdominal pain in the epigastric region as well as across the left and right upper quadrants.  He had some gas and diarrhea.  He still has some aching in the abdominal region and has occasional sharp pain.  There is no nausea or vomiting.  He did have some chills but no fever.  No urinary symptoms no blood in the stool.  Food possibly made it worse but has been eating bland diet which has not been as bad.  He denies any reflux.  He does not use NSAIDs or drink alcohol or caffeine.  He does not smoke anymore.    I have reviewed and updated his medications, medical history and problem list during today's office visit.     Patient Care Team:  Billy Aldrich MD as PCP - General    Problem List Tab  Medications Tab  Synopsis Tab  Chart Review Tab  Care Everywhere Tab  Immunizations Tab  Patient History Tab    Social History     Tobacco Use   • Smoking status: Former Smoker     Packs/day: 0.50     Years: 10.00     Pack years: 5.00     Types: Cigarettes     Quit date:      Years since quittin.9   • Smokeless tobacco: Current User     Types: Snuff   • Tobacco comment: quit    Substance Use Topics   • Alcohol use: Yes     Frequency: Monthly or less     Drinks per session: 1 or 2     Binge frequency: Never       Review of Systems   Constitutional: Negative for chills, fatigue and fever.   HENT: Negative for congestion and nosebleeds.    Eyes: Negative for double vision.   Respiratory: Negative for chest tightness and shortness of breath.    Cardiovascular: Negative for chest pain and palpitations.   Gastrointestinal: Positive for abdominal pain. Negative for blood in stool, constipation, diarrhea, nausea and vomiting.   Genitourinary: Negative for dysuria and hematuria.   Neurological: Negative for  dizziness and syncope.   Psychiatric/Behavioral: Negative for depressed mood.       Objective     Rooming Tab(CC,VS,Pt Hx,Fall Screen)  /87   Pulse 98   Temp 97.5 °F (36.4 °C)   Resp 12   Wt 127 kg (279 lb 9.6 oz)   BMI 36.89 kg/m²     Body mass index is 36.89 kg/m².    Physical Exam  Vitals signs and nursing note reviewed.   Constitutional:       General: He is not in acute distress.     Appearance: Normal appearance. He is well-developed. He is obese.   HENT:      Head: Normocephalic and atraumatic.      Right Ear: Tympanic membrane and ear canal normal.      Left Ear: Tympanic membrane and ear canal normal.      Nose: Nose normal.      Mouth/Throat:      Mouth: Mucous membranes are moist.      Pharynx: Oropharynx is clear.   Eyes:      General: Lids are normal.      Conjunctiva/sclera: Conjunctivae normal.      Pupils: Pupils are equal, round, and reactive to light.   Neck:      Musculoskeletal: Normal range of motion and neck supple.      Thyroid: No thyroid mass or thyromegaly.      Vascular: No carotid bruit or JVD.      Trachea: Trachea normal.      Comments: No carotid bruits  Cardiovascular:      Rate and Rhythm: Normal rate and regular rhythm.      Heart sounds: Normal heart sounds.   Pulmonary:      Effort: Pulmonary effort is normal.      Breath sounds: Normal breath sounds.   Abdominal:      Comments: Tender epigastric region, bowel sounds present, no guarding or rebound.  Nondistended   Musculoskeletal:      Comments: No c/c/e   Skin:     General: Skin is warm and dry.   Neurological:      General: No focal deficit present.      Mental Status: He is alert and oriented to person, place, and time.      Cranial Nerves: No cranial nerve deficit.   Psychiatric:         Attention and Perception: He is attentive.         Mood and Affect: Mood normal.         Speech: Speech normal.         Behavior: Behavior normal.          Statin Choice Calculator  Data Reviewed:    Us Abdomen Limited    Result  Date: 11/27/2020  Impression: 1. Fatty liver. 2. Normal gallbladder. 3. Common bile duct at the upper limits of normal measuring 6 mm.  Electronically Signed By-Anthony Frederick MD On:11/27/2020 7:51 PM This report was finalized on [id number] by  Anthony Frederick MD.            Lab Results   Component Value Date    BUN 14 11/25/2020    CREATININE 0.77 11/25/2020    EGFRIFNONA 108 11/25/2020     11/25/2020    K 4.5 11/25/2020     11/25/2020    CALCIUM 10.1 11/25/2020    ALBUMIN 4.60 11/25/2020    BILITOT 0.5 11/25/2020    ALKPHOS 64 11/25/2020    AST 43 (H) 11/25/2020    ALT 31 11/25/2020    TRIG 430 (H) 09/02/2020    HDL 25 (L) 09/02/2020    VLDL  09/02/2020      Comment:      Unable to calculate    LDL  09/02/2020      Comment:      Unable to calculate    LDL 87 09/02/2020    LDLHDL  09/02/2020      Comment:      Unable to calculate    MICROALBUR 3.4 09/02/2020    WBC 6.87 11/25/2020    RBC 4.79 11/25/2020    HCT 44.3 11/25/2020    MCV 92.5 11/25/2020    MCH 31.9 11/25/2020      Assessment/Plan   Order Review Tab  Health Maintenance Tab  Patient Plan/Order Tab  Diagnoses and all orders for this visit:    1. Epigastric pain (Primary)  Assessment & Plan:  We will check labs as well as an ultrasound the gallbladder.  We will also give him some hydrocodone as needed.  Protonix 40 mg daily.  He may require an EGD and/or further work-up including possible HIDA scan or CT scan.  Follow-up after ultrasound and labs are done by phone to determine further evaluation and treatment.    Orders:  -     CBC & Differential  -     Comprehensive Metabolic Panel  -     Lipase  -     Cancel: US Gallbladder; Future  -     CBC Auto Differential    Other orders  -     Discontinue: pantoprazole (Protonix) 40 MG EC tablet; Take 1 tablet by mouth Daily.  Dispense: 30 tablet; Refill: 1  -     Discontinue: HYDROcodone-acetaminophen (Norco) 7.5-325 MG per tablet; Take 1 tablet by mouth Every 4 (Four) Hours As Needed for Moderate Pain .   Dispense: 20 tablet; Refill: 0  -     HYDROcodone-acetaminophen (Norco) 7.5-325 MG per tablet; Take 1 tablet by mouth Every 4 (Four) Hours As Needed for Moderate Pain .  Dispense: 20 tablet; Refill: 0      Wrapup Tab  Return if symptoms worsen or fail to improve, for Recheck.

## 2020-11-25 NOTE — TELEPHONE ENCOUNTER
Patient called stating that HYDROcodone-acetaminophen (Norco) 7.5-325 MG per tablet never made it to the pharmacy. Was told will be prescribed during today's visit    Please advise

## 2020-11-27 ENCOUNTER — HOSPITAL ENCOUNTER (OUTPATIENT)
Dept: ULTRASOUND IMAGING | Facility: HOSPITAL | Age: 50
Discharge: HOME | End: 2020-11-27
Admitting: FAMILY MEDICINE

## 2020-11-27 DIAGNOSIS — R10.13 EPIGASTRIC PAIN: ICD-10-CM

## 2020-11-27 PROCEDURE — 76705 ECHO EXAM OF ABDOMEN: CPT

## 2020-11-29 NOTE — ASSESSMENT & PLAN NOTE
We will check labs as well as an ultrasound the gallbladder.  We will also give him some hydrocodone as needed.  Protonix 40 mg daily.  He may require an EGD and/or further work-up including possible HIDA scan or CT scan.  Follow-up after ultrasound and labs are done by phone to determine further evaluation and treatment.

## 2020-12-03 ENCOUNTER — PREP FOR SURGERY (OUTPATIENT)
Dept: OTHER | Facility: HOSPITAL | Age: 50
End: 2020-12-03

## 2020-12-03 DIAGNOSIS — Z83.71 FAMILY HISTORY OF POLYPS IN THE COLON: ICD-10-CM

## 2020-12-03 DIAGNOSIS — Z86.010 HISTORY OF COLON POLYPS: Primary | ICD-10-CM

## 2020-12-03 DIAGNOSIS — Z80.0 FAMILY HISTORY OF COLON CANCER IN FATHER: ICD-10-CM

## 2020-12-04 PROBLEM — Z83.71 FAMILY HISTORY OF POLYPS IN THE COLON: Status: ACTIVE | Noted: 2020-12-04

## 2020-12-04 PROBLEM — Z80.0 FAMILY HISTORY OF COLON CANCER IN FATHER: Status: ACTIVE | Noted: 2020-12-04

## 2020-12-04 PROBLEM — Z83.719 FAMILY HISTORY OF POLYPS IN THE COLON: Status: ACTIVE | Noted: 2020-12-04

## 2020-12-04 PROBLEM — Z86.0100 HISTORY OF COLON POLYPS: Status: ACTIVE | Noted: 2020-12-04

## 2020-12-04 PROBLEM — Z86.010 HISTORY OF COLON POLYPS: Status: ACTIVE | Noted: 2020-12-04

## 2021-01-19 ENCOUNTER — TRANSCRIBE ORDERS (OUTPATIENT)
Dept: LAB | Facility: HOSPITAL | Age: 51
End: 2021-01-19

## 2021-01-19 DIAGNOSIS — Z01.818 OTHER SPECIFIED PRE-OPERATIVE EXAMINATION: Primary | ICD-10-CM

## 2021-01-26 ENCOUNTER — LAB (OUTPATIENT)
Dept: LAB | Facility: HOSPITAL | Age: 51
End: 2021-01-26

## 2021-01-26 DIAGNOSIS — Z01.818 OTHER SPECIFIED PRE-OPERATIVE EXAMINATION: ICD-10-CM

## 2021-01-26 PROCEDURE — C9803 HOPD COVID-19 SPEC COLLECT: HCPCS

## 2021-01-26 PROCEDURE — U0004 COV-19 TEST NON-CDC HGH THRU: HCPCS

## 2021-01-27 LAB — SARS-COV-2 RNA RESP QL NAA+PROBE: NOT DETECTED

## 2021-01-28 ENCOUNTER — ANESTHESIA EVENT (OUTPATIENT)
Dept: GASTROENTEROLOGY | Facility: HOSPITAL | Age: 51
End: 2021-01-28

## 2021-01-28 ENCOUNTER — ANESTHESIA (OUTPATIENT)
Dept: GASTROENTEROLOGY | Facility: HOSPITAL | Age: 51
End: 2021-01-28

## 2021-01-28 ENCOUNTER — HOSPITAL ENCOUNTER (OUTPATIENT)
Facility: HOSPITAL | Age: 51
Setting detail: HOSPITAL OUTPATIENT SURGERY
Discharge: HOME | End: 2021-01-28
Attending: COLON & RECTAL SURGERY | Admitting: COLON & RECTAL SURGERY

## 2021-01-28 VITALS
OXYGEN SATURATION: 97 % | RESPIRATION RATE: 16 BRPM | DIASTOLIC BLOOD PRESSURE: 75 MMHG | WEIGHT: 267.06 LBS | HEIGHT: 73 IN | SYSTOLIC BLOOD PRESSURE: 116 MMHG | BODY MASS INDEX: 35.4 KG/M2 | HEART RATE: 79 BPM

## 2021-01-28 DIAGNOSIS — Z83.71 FAMILY HISTORY OF POLYPS IN THE COLON: ICD-10-CM

## 2021-01-28 DIAGNOSIS — Z80.0 FAMILY HISTORY OF COLON CANCER IN FATHER: ICD-10-CM

## 2021-01-28 DIAGNOSIS — Z86.010 HISTORY OF COLON POLYPS: ICD-10-CM

## 2021-01-28 LAB — GLUCOSE BLDC GLUCOMTR-MCNC: 100 MG/DL (ref 70–130)

## 2021-01-28 PROCEDURE — 88305 TISSUE EXAM BY PATHOLOGIST: CPT | Performed by: COLON & RECTAL SURGERY

## 2021-01-28 PROCEDURE — 45385 COLONOSCOPY W/LESION REMOVAL: CPT | Performed by: COLON & RECTAL SURGERY

## 2021-01-28 PROCEDURE — 82962 GLUCOSE BLOOD TEST: CPT

## 2021-01-28 PROCEDURE — 25010000002 PROPOFOL 10 MG/ML EMULSION: Performed by: ANESTHESIOLOGY

## 2021-01-28 RX ORDER — SODIUM CHLORIDE, SODIUM LACTATE, POTASSIUM CHLORIDE, CALCIUM CHLORIDE 600; 310; 30; 20 MG/100ML; MG/100ML; MG/100ML; MG/100ML
30 INJECTION, SOLUTION INTRAVENOUS CONTINUOUS PRN
Status: DISCONTINUED | OUTPATIENT
Start: 2021-01-28 | End: 2021-01-28 | Stop reason: HOSPADM

## 2021-01-28 RX ORDER — PROPOFOL 10 MG/ML
VIAL (ML) INTRAVENOUS AS NEEDED
Status: DISCONTINUED | OUTPATIENT
Start: 2021-01-28 | End: 2021-01-28 | Stop reason: SURG

## 2021-01-28 RX ORDER — PROPOFOL 10 MG/ML
VIAL (ML) INTRAVENOUS CONTINUOUS PRN
Status: DISCONTINUED | OUTPATIENT
Start: 2021-01-28 | End: 2021-01-28 | Stop reason: SURG

## 2021-01-28 RX ORDER — LIDOCAINE HYDROCHLORIDE 20 MG/ML
INJECTION, SOLUTION INFILTRATION; PERINEURAL AS NEEDED
Status: DISCONTINUED | OUTPATIENT
Start: 2021-01-28 | End: 2021-01-28 | Stop reason: SURG

## 2021-01-28 RX ADMIN — PROPOFOL 100 MG: 10 INJECTION, EMULSION INTRAVENOUS at 12:31

## 2021-01-28 RX ADMIN — LIDOCAINE HYDROCHLORIDE 60 MG: 20 INJECTION, SOLUTION INFILTRATION; PERINEURAL at 12:34

## 2021-01-28 RX ADMIN — SODIUM CHLORIDE, POTASSIUM CHLORIDE, SODIUM LACTATE AND CALCIUM CHLORIDE 30 ML/HR: 600; 310; 30; 20 INJECTION, SOLUTION INTRAVENOUS at 12:05

## 2021-01-28 RX ADMIN — PROPOFOL 100 MCG/KG/MIN: 10 INJECTION, EMULSION INTRAVENOUS at 12:34

## 2021-01-28 NOTE — ANESTHESIA POSTPROCEDURE EVALUATION
"Patient: Qasim Ch    Procedure Summary     Date: 01/28/21 Room / Location: [facility] ENDOSCOPY 9 /  [facility]    Anesthesia Start: 1231 Anesthesia Stop: 1304    Procedure: COLONOSCOPY to cecum and TI with hot snare polypectomy (N/A ) Diagnosis:       History of colon polyps      Family history of colon cancer in father      Family history of polyps in the colon      (History of colon polyps [Z86.010])      (Family history of colon cancer in father [Z80.0])      (Family history of polyps in the colon [Z83.71])    Surgeon: Juancho Meier MD Provider: David Martinez MD    Anesthesia Type: MAC ASA Status: 3          Anesthesia Type: MAC    Vitals  Vitals Value Taken Time   /75 01/28/21 1318   Temp     Pulse 79 01/28/21 1318   Resp 16 01/28/21 1318   SpO2 97 % 01/28/21 1318           Post Anesthesia Care and Evaluation    Patient location during evaluation: PACU  Patient participation: complete - patient participated  Level of consciousness: awake  Pain score: 0  Pain management: adequate  Airway patency: patent  Anesthetic complications: No anesthetic complications  PONV Status: none  Cardiovascular status: acceptable  Respiratory status: acceptable  Hydration status: acceptable    Comments: /75 (BP Location: Left arm, Patient Position: Sitting)   Pulse 79   Resp 16   Ht 185.4 cm (73\")   Wt 121 kg (267 lb 1 oz)   SpO2 97%   BMI 35.23 kg/m²       "

## 2021-01-28 NOTE — ANESTHESIA PREPROCEDURE EVALUATION
Anesthesia Evaluation     Patient summary reviewed and Nursing notes reviewed                Airway   Mallampati: I  TM distance: >3 FB  Neck ROM: full  No difficulty expected  Dental - normal exam     Pulmonary - normal exam   (+) sleep apnea,   Cardiovascular - normal exam    (+) hypertension, hyperlipidemia,       Neuro/Psych  (+) numbness,     GI/Hepatic/Renal/Endo    (+) obesity,  GERD,  liver disease, diabetes mellitus,   (-) morbid obesity    Musculoskeletal     Abdominal  - normal exam    Bowel sounds: normal.   Substance History - negative use     OB/GYN negative ob/gyn ROS         Other   arthritis,                      Anesthesia Plan    ASA 3     MAC       Anesthetic plan, all risks, benefits, and alternatives have been provided, discussed and informed consent has been obtained with: patient.

## 2021-01-29 LAB
LAB AP CASE REPORT: NORMAL
LAB AP CLINICAL INFORMATION: NORMAL
PATH REPORT.FINAL DX SPEC: NORMAL
PATH REPORT.GROSS SPEC: NORMAL

## 2021-02-09 ENCOUNTER — DOCUMENTATION (OUTPATIENT)
Dept: SURGERY | Facility: CLINIC | Age: 51
End: 2021-02-09

## 2021-02-09 NOTE — PROGRESS NOTES
02/10/2021, copy of colonoscopy from 01/28/2021, & polyp letter with result mailed to patient.     Rescope in 5 years.    Thank you.

## 2021-02-25 ENCOUNTER — TELEPHONE (OUTPATIENT)
Dept: SURGERY | Facility: CLINIC | Age: 51
End: 2021-02-25

## 2021-02-25 NOTE — TELEPHONE ENCOUNTER
Patient is having a flare up with his diverticulitis and his PCP has retired and does not have a new one yet.    Has been taking cipro 500 mg and metronidazole 500 mg his old PCP had prescribed those rx to him he started taking them on the 19th of Feb/2021.    Last 3 days has been in gut rest (liquid diet).    It's been 5 days into antibiotics feels some improvement but  still feels discomfort so he is not sure if he should discontinue both rx or can you advice or suggest something else.    Contact #[phone].    [facility] in [facility].

## 2021-02-25 NOTE — TELEPHONE ENCOUNTER
Pt phoned office and I relayed Dr. Meier's reply to his message, and he voiced he understood that he can go to ED or be seen in our office next week. Patient will finish antibiotics 1st and hopefully he doesn't have to call our office next week to be seen.    Thank you.

## 2021-02-28 ENCOUNTER — HOSPITAL ENCOUNTER (EMERGENCY)
Facility: HOSPITAL | Age: 51
Discharge: HOME | End: 2021-02-28
Attending: EMERGENCY MEDICINE | Admitting: EMERGENCY MEDICINE

## 2021-02-28 ENCOUNTER — APPOINTMENT (OUTPATIENT)
Dept: CT IMAGING | Facility: HOSPITAL | Age: 51
End: 2021-02-28

## 2021-02-28 VITALS
TEMPERATURE: 98.8 F | SYSTOLIC BLOOD PRESSURE: 119 MMHG | OXYGEN SATURATION: 95 % | HEART RATE: 69 BPM | RESPIRATION RATE: 17 BRPM | BODY MASS INDEX: 35.97 KG/M2 | DIASTOLIC BLOOD PRESSURE: 75 MMHG | HEIGHT: 73 IN | WEIGHT: 271.4 LBS

## 2021-02-28 DIAGNOSIS — R10.9 RIGHT SIDED ABDOMINAL PAIN: Primary | ICD-10-CM

## 2021-02-28 LAB
ALBUMIN SERPL-MCNC: 4.3 G/DL (ref 3.5–5.2)
ALBUMIN/GLOB SERPL: 1.3 G/DL
ALP SERPL-CCNC: 57 U/L (ref 39–117)
ALT SERPL W P-5'-P-CCNC: 29 U/L (ref 1–41)
ANION GAP SERPL CALCULATED.3IONS-SCNC: 10.3 MMOL/L (ref 5–15)
AST SERPL-CCNC: 35 U/L (ref 1–40)
BASOPHILS # BLD AUTO: 0.04 10*3/MM3 (ref 0–0.2)
BASOPHILS NFR BLD AUTO: 0.5 % (ref 0–1.5)
BILIRUB SERPL-MCNC: 0.5 MG/DL (ref 0–1.2)
BILIRUB UR QL STRIP: NEGATIVE
BUN SERPL-MCNC: 11 MG/DL (ref 6–20)
BUN/CREAT SERPL: 14.5 (ref 7–25)
CALCIUM SPEC-SCNC: 9.4 MG/DL (ref 8.6–10.5)
CHLORIDE SERPL-SCNC: 100 MMOL/L (ref 98–107)
CLARITY UR: CLEAR
CO2 SERPL-SCNC: 25.7 MMOL/L (ref 22–29)
COLOR UR: YELLOW
CREAT SERPL-MCNC: 0.76 MG/DL (ref 0.76–1.27)
DEPRECATED RDW RBC AUTO: 42.6 FL (ref 37–54)
EOSINOPHIL # BLD AUTO: 0.08 10*3/MM3 (ref 0–0.4)
EOSINOPHIL NFR BLD AUTO: 0.9 % (ref 0.3–6.2)
ERYTHROCYTE [DISTWIDTH] IN BLOOD BY AUTOMATED COUNT: 12.9 % (ref 12.3–15.4)
GFR SERPL CREATININE-BSD FRML MDRD: 109 ML/MIN/1.73
GLOBULIN UR ELPH-MCNC: 3.4 GM/DL
GLUCOSE SERPL-MCNC: 84 MG/DL (ref 65–99)
GLUCOSE UR STRIP-MCNC: NEGATIVE MG/DL
HCT VFR BLD AUTO: 44.6 % (ref 37.5–51)
HGB BLD-MCNC: 15.3 G/DL (ref 13–17.7)
HGB UR QL STRIP.AUTO: NEGATIVE
IMM GRANULOCYTES # BLD AUTO: 0.03 10*3/MM3 (ref 0–0.05)
IMM GRANULOCYTES NFR BLD AUTO: 0.4 % (ref 0–0.5)
KETONES UR QL STRIP: NEGATIVE
LEUKOCYTE ESTERASE UR QL STRIP.AUTO: NEGATIVE
LIPASE SERPL-CCNC: 26 U/L (ref 13–60)
LYMPHOCYTES # BLD AUTO: 2.45 10*3/MM3 (ref 0.7–3.1)
LYMPHOCYTES NFR BLD AUTO: 28.8 % (ref 19.6–45.3)
MCH RBC QN AUTO: 30.9 PG (ref 26.6–33)
MCHC RBC AUTO-ENTMCNC: 34.3 G/DL (ref 31.5–35.7)
MCV RBC AUTO: 90.1 FL (ref 79–97)
MONOCYTES # BLD AUTO: 0.81 10*3/MM3 (ref 0.1–0.9)
MONOCYTES NFR BLD AUTO: 9.5 % (ref 5–12)
NEUTROPHILS NFR BLD AUTO: 5.09 10*3/MM3 (ref 1.7–7)
NEUTROPHILS NFR BLD AUTO: 59.9 % (ref 42.7–76)
NITRITE UR QL STRIP: NEGATIVE
NRBC BLD AUTO-RTO: 0 /100 WBC (ref 0–0.2)
PH UR STRIP.AUTO: 6 [PH] (ref 5–8)
PLATELET # BLD AUTO: 215 10*3/MM3 (ref 140–450)
PMV BLD AUTO: 10.1 FL (ref 6–12)
POTASSIUM SERPL-SCNC: 4.4 MMOL/L (ref 3.5–5.2)
PROT SERPL-MCNC: 7.7 G/DL (ref 6–8.5)
PROT UR QL STRIP: NEGATIVE
RBC # BLD AUTO: 4.95 10*6/MM3 (ref 4.14–5.8)
SODIUM SERPL-SCNC: 136 MMOL/L (ref 136–145)
SP GR UR STRIP: 1.01 (ref 1–1.03)
UROBILINOGEN UR QL STRIP: NORMAL
WBC # BLD AUTO: 8.5 10*3/MM3 (ref 3.4–10.8)

## 2021-02-28 PROCEDURE — 96374 THER/PROPH/DIAG INJ IV PUSH: CPT

## 2021-02-28 PROCEDURE — 81003 URINALYSIS AUTO W/O SCOPE: CPT | Performed by: EMERGENCY MEDICINE

## 2021-02-28 PROCEDURE — 96376 TX/PRO/DX INJ SAME DRUG ADON: CPT

## 2021-02-28 PROCEDURE — 96375 TX/PRO/DX INJ NEW DRUG ADDON: CPT

## 2021-02-28 PROCEDURE — 25010000002 IOPAMIDOL 61 % SOLUTION: Performed by: EMERGENCY MEDICINE

## 2021-02-28 PROCEDURE — 25010000002 ONDANSETRON PER 1 MG: Performed by: EMERGENCY MEDICINE

## 2021-02-28 PROCEDURE — 99284 EMERGENCY DEPT VISIT MOD MDM: CPT

## 2021-02-28 PROCEDURE — 25010000002 HYDROMORPHONE PER 4 MG: Performed by: EMERGENCY MEDICINE

## 2021-02-28 PROCEDURE — 83690 ASSAY OF LIPASE: CPT | Performed by: EMERGENCY MEDICINE

## 2021-02-28 PROCEDURE — 74177 CT ABD & PELVIS W/CONTRAST: CPT

## 2021-02-28 PROCEDURE — 80053 COMPREHEN METABOLIC PANEL: CPT | Performed by: EMERGENCY MEDICINE

## 2021-02-28 PROCEDURE — 85025 COMPLETE CBC W/AUTO DIFF WBC: CPT | Performed by: EMERGENCY MEDICINE

## 2021-02-28 RX ORDER — HYDROMORPHONE HYDROCHLORIDE 1 MG/ML
0.5 INJECTION, SOLUTION INTRAMUSCULAR; INTRAVENOUS; SUBCUTANEOUS ONCE
Status: COMPLETED | OUTPATIENT
Start: 2021-02-28 | End: 2021-02-28

## 2021-02-28 RX ORDER — ONDANSETRON 2 MG/ML
4 INJECTION INTRAMUSCULAR; INTRAVENOUS ONCE
Status: COMPLETED | OUTPATIENT
Start: 2021-02-28 | End: 2021-02-28

## 2021-02-28 RX ADMIN — SODIUM CHLORIDE 1000 ML: 9 INJECTION, SOLUTION INTRAVENOUS at 12:08

## 2021-02-28 RX ADMIN — HYDROMORPHONE HYDROCHLORIDE 0.5 MG: 1 INJECTION, SOLUTION INTRAMUSCULAR; INTRAVENOUS; SUBCUTANEOUS at 12:10

## 2021-02-28 RX ADMIN — HYDROMORPHONE HYDROCHLORIDE 0.5 MG: 1 INJECTION, SOLUTION INTRAMUSCULAR; INTRAVENOUS; SUBCUTANEOUS at 12:44

## 2021-02-28 RX ADMIN — ONDANSETRON 4 MG: 2 INJECTION INTRAMUSCULAR; INTRAVENOUS at 12:10

## 2021-02-28 RX ADMIN — IOPAMIDOL 100 ML: 612 INJECTION, SOLUTION INTRAVENOUS at 13:08

## 2021-03-11 ENCOUNTER — OFFICE VISIT (OUTPATIENT)
Dept: INTERNAL MEDICINE | Age: 51
End: 2021-03-11

## 2021-03-11 VITALS
OXYGEN SATURATION: 97 % | TEMPERATURE: 97.5 F | DIASTOLIC BLOOD PRESSURE: 82 MMHG | HEART RATE: 96 BPM | WEIGHT: 275 LBS | HEIGHT: 73 IN | SYSTOLIC BLOOD PRESSURE: 142 MMHG | BODY MASS INDEX: 36.45 KG/M2

## 2021-03-11 DIAGNOSIS — E78.1 HYPERTRIGLYCERIDEMIA: Chronic | ICD-10-CM

## 2021-03-11 DIAGNOSIS — G47.33 OBSTRUCTIVE SLEEP APNEA SYNDROME: Chronic | ICD-10-CM

## 2021-03-11 DIAGNOSIS — M48.061 SPINAL STENOSIS OF LUMBAR REGION WITHOUT NEUROGENIC CLAUDICATION: ICD-10-CM

## 2021-03-11 DIAGNOSIS — E11.9 TYPE 2 DIABETES MELLITUS WITHOUT COMPLICATION, WITHOUT LONG-TERM CURRENT USE OF INSULIN (HCC): Primary | Chronic | ICD-10-CM

## 2021-03-11 DIAGNOSIS — I10 ESSENTIAL HYPERTENSION: Chronic | ICD-10-CM

## 2021-03-11 DIAGNOSIS — K21.9 GASTROESOPHAGEAL REFLUX DISEASE WITHOUT ESOPHAGITIS: Chronic | ICD-10-CM

## 2021-03-11 DIAGNOSIS — E78.1 PRIMARY HYPERTRIGLYCERIDEMIA: ICD-10-CM

## 2021-03-11 PROCEDURE — 99215 OFFICE O/P EST HI 40 MIN: CPT | Performed by: INTERNAL MEDICINE

## 2021-03-11 RX ORDER — DIPHENOXYLATE HYDROCHLORIDE AND ATROPINE SULFATE 2.5; .025 MG/1; MG/1
1 TABLET ORAL NIGHTLY
COMMUNITY

## 2021-03-11 NOTE — ASSESSMENT & PLAN NOTE
Previously started on gemfibrozil by Dr. Fraser.   Check lipid panel and CMP.     Lab Results   Component Value Date    LDL  09/02/2020      Comment:      Unable to calculate    LDL 87 09/02/2020    LDL  12/27/2019      Comment:      Unable to calculate    LDL 85 12/27/2019    HDL 25 (L) 09/02/2020    TRIG 430 (H) 09/02/2020    CHOLHDLRATIO 9.6 (calc) (H) 03/08/2017

## 2021-03-11 NOTE — ASSESSMENT & PLAN NOTE
Lab Results   Component Value Date    HGBA1C 5.7 (H) 09/02/2020    HGBA1C 6.5 (H) 01/03/2020    CREATININE 0.76 02/28/2021    LDL  09/02/2020      Comment:      Unable to calculate    LDL 87 09/02/2020    MICROALBUR 3.4 09/02/2020

## 2021-03-11 NOTE — ASSESSMENT & PLAN NOTE
His prior spine specialist no longer is with [facility].   Will place referral to neurosurgery.  Continue follow-up with pain mgmt ([facility]).

## 2021-03-11 NOTE — PROGRESS NOTES
I N T E R N A L  M E D I C I N E  J U N O H  K I M,  M D      ENCOUNTER DATE:  03/11/2021    Qasim LEWIS Ch / 50 y.o. / male      CHIEF COMPLAINT / REASON FOR OFFICE VISIT     Establish Care (dr kenyetta rose)      ASSESSMENT & PLAN     Problem List Items Addressed This Visit     Diabetes mellitus, type II (CMS/[facility]) - Primary (Chronic)    Current Assessment & Plan     Lab Results   Component Value Date    HGBA1C 5.7 (H) 09/02/2020    HGBA1C 6.5 (H) 01/03/2020    CREATININE 0.76 02/28/2021    LDL  09/02/2020      Comment:      Unable to calculate    LDL 87 09/02/2020    MICROALBUR 3.4 09/02/2020             Relevant Medications    metFORMIN (Glucophage) 1000 MG tablet    Other Relevant Orders    Hemoglobin A1c    Comprehensive Metabolic Panel    Hypertension (Chronic)    Current Assessment & Plan     Blood pressure is little high here today. Monitor home blood pressure readings.      Continue lisinopril 10 mg qd for now.          Relevant Medications    lisinopril (PRINIVIL,ZESTRIL) 10 MG tablet    Hypertriglyceridemia (Chronic)    Current Assessment & Plan     Previously started on gemfibrozil by Dr. Fraser.   Check lipid panel and CMP.     Lab Results   Component Value Date    LDL  09/02/2020      Comment:      Unable to calculate    LDL 87 09/02/2020    LDL  12/27/2019      Comment:      Unable to calculate    LDL 85 12/27/2019    HDL 25 (L) 09/02/2020    TRIG 430 (H) 09/02/2020    CHOLHDLRATIO 9.6 (calc) (H) 03/08/2017             Relevant Medications    gemfibrozil (Lopid) 600 MG tablet    Spinal stenosis of lumbar region without neurogenic claudication (Chronic)    Current Assessment & Plan     His prior spine specialist no longer is with [facility].   Will place referral to neurosurgery.  Continue follow-up with pain mgmt ([facility]).          Relevant Orders    Ambulatory Referral to Neurosurgery    Obstructive sleep apnea syndrome (Chronic)    Overview     On CPAP          Current Assessment  "& Plan     Continue CPAP nightly.          Gastroesophageal reflux disease without esophagitis (Chronic)    Current Assessment & Plan     Continue pantoprazole 40 mg qd.          Relevant Medications    pantoprazole (PROTONIX) 40 MG EC tablet      Other Visit Diagnoses     Primary hypertriglyceridemia        Relevant Orders    Lipid Panel With / Chol / HDL Ratio    Comprehensive Metabolic Panel        Orders Placed This Encounter   Procedures   • Hemoglobin A1c   • Lipid Panel With / Chol / HDL Ratio   • Comprehensive Metabolic Panel   • Ambulatory Referral to Neurosurgery     No orders of the defined types were placed in this encounter.      SUMMARY/DISCUSSION  • Spent 50 minutes in total encounter time.       Next Appointment with me: 6/22/2021    Return in about 3 months (around 6/11/2021) for Reassess chronic medical problems.      VITAL SIGNS     Visit Vitals  /82 (BP Location: Left arm)   Pulse 96   Temp 97.5 °F (36.4 °C)   Ht 185.4 cm (73\")   Wt 125 kg (275 lb)   SpO2 97%   BMI 36.28 kg/m²       BP Readings from Last 3 Encounters:   03/11/21 142/82   02/28/21 119/75   01/28/21 116/75     Wt Readings from Last 3 Encounters:   03/11/21 125 kg (275 lb)   02/28/21 123 kg (271 lb 6.4 oz)   01/28/21 121 kg (267 lb 1 oz)     Body mass index is 36.28 kg/m².      MEDICATIONS AT THE TIME OF OFFICE VISIT     Current Outpatient Medications on File Prior to Visit   Medication Sig   • gemfibrozil (Lopid) 600 MG tablet Take 1 tablet by mouth 2 (Two) Times a Day Before Meals.   • lisinopril (PRINIVIL,ZESTRIL) 10 MG tablet take 1 tablet by mouth once daily   • metFORMIN (Glucophage) 1000 MG tablet Take 1 tablet by mouth 2 (Two) Times a Day With Meals.   • multivitamin (MULTIPLE VITAMINS PO) Take 1 tablet by mouth Daily.   • pantoprazole (PROTONIX) 40 MG EC tablet TAKE 1 TABLET BY MOUTH DAILY   • tiZANidine (Zanaflex) 4 MG tablet 1-2 po q hs for cramps   • ACETAMINOPHEN EXTRA STRENGTH PO TYLENOL CAPS     No current " facility-administered medications on file prior to visit.          HISTORY OF PRESENT ILLNESS     49 yo male here to establish care. He lives in [facility] but his medical providers have all left [facility].     Was in the ER last month for RLQ pain but CT and labs were negative. S/p sigmoid colectomy for diverticulitis in 2016. He was treated for possible diverticulitis and symptoms improved. Denies current abdominal pain.     Underwent lumbar spine surgery 12/2018 with Dr. Jauregui at [facility] and sees pain management but has ongoing spine related pain problems.     Type 2 DM on metformin. Obesity with intended wt loss.   Lab Results   Component Value Date    HGBA1C 5.7 (H) 09/02/2020    HGBA1C 6.5 (H) 01/03/2020      Hypertension on lisinopril 10 mg. Blood pressure was elevated in ER in September but remains < 130/80 at home.     Hypertriglyceridemia started on gemfibrozil by prior PCP.   Lab Results   Component Value Date    LDL  09/02/2020      Comment:      Unable to calculate    LDL 87 09/02/2020    LDL  12/27/2019      Comment:      Unable to calculate    LDL 85 12/27/2019    HDL 25 (L) 09/02/2020    TRIG 430 (H) 09/02/2020    CHOLHDLRATIO 9.6 (calc) (H) 03/08/2017      On CPAP for WOLF. Has lost significant weight by intent.     On PPI for GERD without dysphagia.           REVIEW OF SYSTEMS     Review of Systems   Constitutional: Negative.         Intended wt loss    HENT: Negative.    Eyes: Negative.    Respiratory: Positive for apnea (on cpap). Negative for shortness of breath.    Cardiovascular: Negative.  Negative for chest pain, palpitations and leg swelling.   Gastrointestinal: Negative.  Negative for blood in stool, constipation, diarrhea and rectal pain. Abdominal pain: no current abd pain.   Endocrine: Negative.         Obesity and diabetes   Genitourinary: Negative.    Musculoskeletal: Positive for back pain.   Skin: Negative.    Allergic/Immunologic: Negative.    Neurological: Negative.          Sciatic pain of lower extremities    Hematological: Negative.    Psychiatric/Behavioral: Negative.           PHYSICAL EXAMINATION     Physical Exam  Constitutional:       General: He is not in acute distress.     Appearance: He is obese.   HENT:      Head: Normocephalic.      Right Ear: Tympanic membrane normal.      Left Ear: Tympanic membrane normal.      Mouth/Throat:      Mouth: No oral lesions.   Eyes:      General: No scleral icterus.     Conjunctiva/sclera: Conjunctivae normal.      Pupils: Pupils are equal, round, and reactive to light.   Neck:      Thyroid: No thyromegaly.      Trachea: No tracheal deviation.   Cardiovascular:      Rate and Rhythm: Normal rate and regular rhythm.      Pulses: Normal pulses.      Heart sounds: Normal heart sounds. No murmur. No friction rub. No gallop.    Pulmonary:      Effort: Pulmonary effort is normal.      Breath sounds: Normal breath sounds.   Abdominal:      General: Bowel sounds are normal. There is no distension.      Palpations: Abdomen is soft. There is no mass.      Tenderness: There is no abdominal tenderness.      Hernia: No hernia is present.      Comments: Obese    Musculoskeletal:         General: No deformity.      Cervical back: Neck supple.      Right lower leg: No edema.      Left lower leg: No edema.   Lymphadenopathy:      Cervical: No cervical adenopathy.      Upper Body:      Right upper body: No supraclavicular adenopathy.      Left upper body: No supraclavicular adenopathy.   Skin:     Coloration: Skin is not jaundiced or pale.      Findings: No erythema or rash.      Comments: No jaundice   Neurological:      Mental Status: He is alert.      Deep Tendon Reflexes: Reflexes are normal and symmetric. Reflexes normal.   Psychiatric:         Mood and Affect: Mood normal.         Behavior: Behavior normal.         Thought Content: Thought content normal.         Judgment: Judgment normal.             REVIEWED DATA     Labs:     Lab Results   Component  Value Date     02/28/2021    K 4.4 02/28/2021    AST 35 02/28/2021    ALT 29 02/28/2021    BUN 11 02/28/2021    CREATININE 0.76 02/28/2021    CREATININE 0.77 11/25/2020    CREATININE 0.98 09/02/2020    EGFRIFNONA 109 02/28/2021    EGFRIFAFRI 118 03/08/2017       Lab Results   Component Value Date    HGBA1C 5.7 (H) 09/02/2020    HGBA1C 6.5 (H) 01/03/2020       Lab Results   Component Value Date    LDL  09/02/2020      Comment:      Unable to calculate    LDL 87 09/02/2020    HDL 25 (L) 09/02/2020    TRIG 430 (H) 09/02/2020       Lab Results   Component Value Date    TSH 0.429 12/27/2019       Lab Results   Component Value Date    WBC 8.50 02/28/2021    HGB 15.3 02/28/2021     02/28/2021         Imaging:     CT ABDOMEN PELVIS W CONTRAST-     INDICATIONS: Abdominal pain     TECHNIQUE: Radiation dose reduction techniques were utilized, including  automated exposure control and exposure modulation based on body size.  Enhanced ABDOMEN AND PELVIS CT     COMPARISON: None available     FINDINGS:     The spleen is enlarged, 13.9 cm.     Otherwise unremarkable appearance of the liver, gallbladder, spleen,  adrenal glands, pancreas, kidneys, bladder.     No bowel obstruction or abnormal bowel thickening is identified. Colonic  diverticula are seen that do not appear inflamed. Appendix does not  appear inflamed. Surgical change is noted at the level of the sigmoid  colon.     No free intraperitoneal gas or free fluid.     Scattered small mesenteric and para-aortic lymph nodes are seen that are  not significant by size criteria.     Abdominal aorta is not aneurysmal. Aortic and other arterial  calcifications are present.     The lung bases show mild atelectasis. Right middle lobe blebs are  apparent.     Degenerative and surgical changes are seen in the spine. No acute  fracture is identified.     IMPRESSION:     1. Colonic diverticulosis. No acute inflammatory process of bowel is  identified, follow-up as  indications persist.  2. No obstructive uropathy. Splenomegaly.     This report was finalized on 2/28/2021      Medical Tests:           Summary of old records / correspondence / consultant report:           Request outside records:             *Examiner was wearing KN95 mask, face shield and exam gloves during the entire duration of the visit. Patient was masked the entire time.   Minimum social distance of 6 ft maintained entire visit except if physical contact was necessary as documented.     **Dragon Disclaimer:   Much of this encounter note is an electronic transcription/translation of spoken language to printed text. The electronic translation of spoken language may permit erroneous, or at times, nonsensical words or phrases to be inadvertently transcribed. Although I have reviewed the note for such errors, some may still exist.     Template created by Jennifer Ortiz MD

## 2021-03-19 ENCOUNTER — LAB (OUTPATIENT)
Dept: LAB | Facility: HOSPITAL | Age: 51
End: 2021-03-19

## 2021-03-19 LAB
ALBUMIN SERPL-MCNC: 4.8 G/DL (ref 3.5–5.2)
ALBUMIN/GLOB SERPL: 1.4 G/DL
ALP SERPL-CCNC: 57 U/L (ref 39–117)
ALT SERPL W P-5'-P-CCNC: 27 U/L (ref 1–41)
ANION GAP SERPL CALCULATED.3IONS-SCNC: 10 MMOL/L (ref 5–15)
AST SERPL-CCNC: 29 U/L (ref 1–40)
BILIRUB SERPL-MCNC: 0.4 MG/DL (ref 0–1.2)
BUN SERPL-MCNC: 13 MG/DL (ref 6–20)
BUN/CREAT SERPL: 16.9 (ref 7–25)
CALCIUM SPEC-SCNC: 10.4 MG/DL (ref 8.6–10.5)
CHLORIDE SERPL-SCNC: 100 MMOL/L (ref 98–107)
CHOLEST SERPL-MCNC: 170 MG/DL (ref 0–200)
CO2 SERPL-SCNC: 27 MMOL/L (ref 22–29)
CREAT SERPL-MCNC: 0.77 MG/DL (ref 0.76–1.27)
GFR SERPL CREATININE-BSD FRML MDRD: 108 ML/MIN/1.73
GLOBULIN UR ELPH-MCNC: 3.5 GM/DL
GLUCOSE SERPL-MCNC: 97 MG/DL (ref 65–99)
HBA1C MFR BLD: 5.3 % (ref 4.8–5.6)
HDLC SERPL QL: 6.54
HDLC SERPL-MCNC: 26 MG/DL (ref 40–60)
LDLC SERPL CALC-MCNC: 89 MG/DL (ref 0–100)
POTASSIUM SERPL-SCNC: 4.5 MMOL/L (ref 3.5–5.2)
PROT SERPL-MCNC: 8.3 G/DL (ref 6–8.5)
SODIUM SERPL-SCNC: 137 MMOL/L (ref 136–145)
TRIGL SERPL-MCNC: 328 MG/DL (ref 0–150)
VLDLC SERPL-MCNC: 55 MG/DL (ref 5–40)

## 2021-03-19 PROCEDURE — 36415 COLL VENOUS BLD VENIPUNCTURE: CPT | Performed by: INTERNAL MEDICINE

## 2021-03-19 PROCEDURE — 83036 HEMOGLOBIN GLYCOSYLATED A1C: CPT | Performed by: INTERNAL MEDICINE

## 2021-03-19 PROCEDURE — 80053 COMPREHEN METABOLIC PANEL: CPT | Performed by: INTERNAL MEDICINE

## 2021-03-19 PROCEDURE — 80061 LIPID PANEL: CPT | Performed by: INTERNAL MEDICINE

## 2021-03-22 DIAGNOSIS — E78.1 PRIMARY HYPERTRIGLYCERIDEMIA: ICD-10-CM

## 2021-03-22 DIAGNOSIS — E78.1 PRIMARY HYPERTRIGLYCERIDEMIA: Primary | ICD-10-CM

## 2021-03-22 RX ORDER — ICOSAPENT ETHYL 1000 MG/1
2 CAPSULE ORAL 2 TIMES DAILY WITH MEALS
Qty: 120 CAPSULE | Refills: 3 | Status: SHIPPED | OUTPATIENT
Start: 2021-03-22 | End: 2021-10-12

## 2021-03-22 RX ORDER — OMEGA-3S/DHA/EPA/FISH OIL 900-110 MG
2 CAPSULE ORAL 2 TIMES DAILY
Qty: 120 CAPSULE | Refills: 3 | Status: CANCELLED | OUTPATIENT
Start: 2021-03-22

## 2021-03-22 RX ORDER — ROSUVASTATIN CALCIUM 10 MG/1
10 TABLET, COATED ORAL NIGHTLY
Qty: 30 TABLET | Refills: 3 | Status: SHIPPED | OUTPATIENT
Start: 2021-03-22 | End: 2021-03-22

## 2021-03-22 NOTE — ASSESSMENT & PLAN NOTE
Gemfibrozil did not really help. Discontinue.   Start rosuvastatin 10 mg and Vascepa 4 grams daily.   Recheck fasting labs prior to follow-up.     Lab Results   Component Value Date    LDL 89 03/19/2021    LDL  09/02/2020      Comment:      Unable to calculate    LDL 87 09/02/2020    HDL 26 (L) 03/19/2021    TRIG 328 (H) 03/19/2021    CHOLHDLRATIO 6.54 03/19/2021

## 2021-03-22 NOTE — PROGRESS NOTES
Call patient with his test result(s) and mail the results to him if MyChart is NOT active.    1. A1c for average glucose level is improved.    2. Triglycerides and HDL level have not changed significantly with gemfibrozil.   - discontinue gemfibrozil  - start rosuvastatin 10 mg qHS for cholesterol/triglycerides  - start Vascepa 1 gram 2 capsules  BID (Dx:: primary hypertriglyceridemia)  - schedule for fasting lipids and CMP 1 week prior to follow-up in June.

## 2021-03-23 RX ORDER — ROSUVASTATIN CALCIUM 10 MG/1
10 TABLET, COATED ORAL NIGHTLY
Qty: 90 TABLET | Refills: 3 | Status: SHIPPED | OUTPATIENT
Start: 2021-03-23 | End: 2021-07-30

## 2021-03-24 ENCOUNTER — TELEPHONE (OUTPATIENT)
Dept: NEUROSURGERY | Facility: CLINIC | Age: 51
End: 2021-03-24

## 2021-04-01 ENCOUNTER — TELEPHONE (OUTPATIENT)
Dept: INTERNAL MEDICINE | Age: 51
End: 2021-04-01

## 2021-04-01 NOTE — TELEPHONE ENCOUNTER
Called pt to inform him rx was denied for vascepa by insurance       Said he paid $50 and picked it up

## 2021-04-12 ENCOUNTER — TELEPHONE (OUTPATIENT)
Dept: INTERNAL MEDICINE | Age: 51
End: 2021-04-12

## 2021-04-12 NOTE — TELEPHONE ENCOUNTER
[employer] WITH [employer] CALLED IN REGARDS TO     icosapent ethyl (Vascepa) 1 g capsule capsule  WILL NEED TO APPEAL THIS IF PATINET IS STILL NEEDING THIS MEDICATION. IT WAS DENIED.     PLEASE CALL MORENA AT   [phone]    REF# [id number]

## 2021-04-20 RX ORDER — LISINOPRIL 10 MG/1
10 TABLET ORAL DAILY
Qty: 90 TABLET | Refills: 3 | Status: SHIPPED | OUTPATIENT
Start: 2021-04-20 | End: 2022-04-28

## 2021-04-20 NOTE — TELEPHONE ENCOUNTER
Caller: Qasim Ch    Relationship: Self    Best call back number: [phone]    Medication needed:   Requested Prescriptions     Pending Prescriptions Disp Refills   • lisinopril (PRINIVIL,ZESTRIL) 10 MG tablet 90 tablet 3     Sig: Take 1 tablet by mouth Daily.       When do you need the refill by: 2021    What additional details did the patient provide when requesting the medication: THE PATIENT IS CURRENTLY OUT OF THIS MEDICATION AND STATES THAT THE REFILLS HAVE . PLEASE ADVISE.    Does the patient have less than a 3 day supply:  [x] Yes  [] No    What is the patient's preferred pharmacy: [id number] - [address] AT [facility] [place] [address] [address] & [address] [phone] [place] [phone] FX

## 2021-05-02 ENCOUNTER — APPOINTMENT (OUTPATIENT)
Dept: CT IMAGING | Facility: HOSPITAL | Age: 51
End: 2021-05-02

## 2021-05-02 ENCOUNTER — APPOINTMENT (OUTPATIENT)
Dept: ULTRASOUND IMAGING | Facility: HOSPITAL | Age: 51
End: 2021-05-02

## 2021-05-02 ENCOUNTER — HOSPITAL ENCOUNTER (EMERGENCY)
Facility: HOSPITAL | Age: 51
Discharge: HOME | End: 2021-05-02
Attending: EMERGENCY MEDICINE | Admitting: EMERGENCY MEDICINE

## 2021-05-02 VITALS
HEART RATE: 76 BPM | HEIGHT: 73 IN | TEMPERATURE: 97.9 F | SYSTOLIC BLOOD PRESSURE: 124 MMHG | BODY MASS INDEX: 36.28 KG/M2 | RESPIRATION RATE: 18 BRPM | DIASTOLIC BLOOD PRESSURE: 73 MMHG | OXYGEN SATURATION: 95 %

## 2021-05-02 DIAGNOSIS — R10.11 RIGHT UPPER QUADRANT PAIN: Primary | ICD-10-CM

## 2021-05-02 LAB
ALBUMIN SERPL-MCNC: 4.1 G/DL (ref 3.5–5.2)
ALBUMIN/GLOB SERPL: 1.2 G/DL
ALP SERPL-CCNC: 54 U/L (ref 39–117)
ALT SERPL W P-5'-P-CCNC: 27 U/L (ref 1–41)
ANION GAP SERPL CALCULATED.3IONS-SCNC: 10.7 MMOL/L (ref 5–15)
AST SERPL-CCNC: 26 U/L (ref 1–40)
BASOPHILS # BLD AUTO: 0.04 10*3/MM3 (ref 0–0.2)
BASOPHILS NFR BLD AUTO: 0.6 % (ref 0–1.5)
BILIRUB SERPL-MCNC: 0.4 MG/DL (ref 0–1.2)
BILIRUB UR QL STRIP: NEGATIVE
BUN SERPL-MCNC: 13 MG/DL (ref 6–20)
BUN/CREAT SERPL: 19.4 (ref 7–25)
CALCIUM SPEC-SCNC: 9.5 MG/DL (ref 8.6–10.5)
CHLORIDE SERPL-SCNC: 102 MMOL/L (ref 98–107)
CLARITY UR: CLEAR
CO2 SERPL-SCNC: 24.3 MMOL/L (ref 22–29)
COLOR UR: YELLOW
CREAT SERPL-MCNC: 0.67 MG/DL (ref 0.76–1.27)
DEPRECATED RDW RBC AUTO: 43.1 FL (ref 37–54)
EOSINOPHIL # BLD AUTO: 0.17 10*3/MM3 (ref 0–0.4)
EOSINOPHIL NFR BLD AUTO: 2.6 % (ref 0.3–6.2)
ERYTHROCYTE [DISTWIDTH] IN BLOOD BY AUTOMATED COUNT: 13 % (ref 12.3–15.4)
GFR SERPL CREATININE-BSD FRML MDRD: 127 ML/MIN/1.73
GLOBULIN UR ELPH-MCNC: 3.3 GM/DL
GLUCOSE SERPL-MCNC: 114 MG/DL (ref 65–99)
GLUCOSE UR STRIP-MCNC: NEGATIVE MG/DL
HCT VFR BLD AUTO: 39.9 % (ref 37.5–51)
HGB BLD-MCNC: 13.8 G/DL (ref 13–17.7)
HGB UR QL STRIP.AUTO: NEGATIVE
HOLD SPECIMEN: NORMAL
HOLD SPECIMEN: NORMAL
IMM GRANULOCYTES # BLD AUTO: 0.01 10*3/MM3 (ref 0–0.05)
IMM GRANULOCYTES NFR BLD AUTO: 0.2 % (ref 0–0.5)
INR PPP: 1.08 (ref 0.9–1.1)
KETONES UR QL STRIP: NEGATIVE
LEUKOCYTE ESTERASE UR QL STRIP.AUTO: NEGATIVE
LIPASE SERPL-CCNC: 20 U/L (ref 13–60)
LYMPHOCYTES # BLD AUTO: 1.95 10*3/MM3 (ref 0.7–3.1)
LYMPHOCYTES NFR BLD AUTO: 29.8 % (ref 19.6–45.3)
MCH RBC QN AUTO: 31.4 PG (ref 26.6–33)
MCHC RBC AUTO-ENTMCNC: 34.6 G/DL (ref 31.5–35.7)
MCV RBC AUTO: 90.9 FL (ref 79–97)
MONOCYTES # BLD AUTO: 0.7 10*3/MM3 (ref 0.1–0.9)
MONOCYTES NFR BLD AUTO: 10.7 % (ref 5–12)
NEUTROPHILS NFR BLD AUTO: 3.68 10*3/MM3 (ref 1.7–7)
NEUTROPHILS NFR BLD AUTO: 56.1 % (ref 42.7–76)
NITRITE UR QL STRIP: NEGATIVE
NRBC BLD AUTO-RTO: 0 /100 WBC (ref 0–0.2)
PH UR STRIP.AUTO: <=5 [PH] (ref 5–8)
PLATELET # BLD AUTO: 187 10*3/MM3 (ref 140–450)
PMV BLD AUTO: 10.1 FL (ref 6–12)
POTASSIUM SERPL-SCNC: 4.2 MMOL/L (ref 3.5–5.2)
PROT SERPL-MCNC: 7.4 G/DL (ref 6–8.5)
PROT UR QL STRIP: NEGATIVE
PROTHROMBIN TIME: 13.8 SECONDS (ref 11.7–14.2)
RBC # BLD AUTO: 4.39 10*6/MM3 (ref 4.14–5.8)
SODIUM SERPL-SCNC: 137 MMOL/L (ref 136–145)
SP GR UR STRIP: 1.02 (ref 1–1.03)
UROBILINOGEN UR QL STRIP: NORMAL
WBC # BLD AUTO: 6.55 10*3/MM3 (ref 3.4–10.8)
WHOLE BLOOD HOLD SPECIMEN: NORMAL
WHOLE BLOOD HOLD SPECIMEN: NORMAL

## 2021-05-02 PROCEDURE — 74177 CT ABD & PELVIS W/CONTRAST: CPT

## 2021-05-02 PROCEDURE — 0 IOPAMIDOL PER 1 ML: Performed by: EMERGENCY MEDICINE

## 2021-05-02 PROCEDURE — 25010000002 HYDROMORPHONE 1 MG/ML SOLUTION: Performed by: EMERGENCY MEDICINE

## 2021-05-02 PROCEDURE — 25010000002 HYDROMORPHONE PER 4 MG: Performed by: EMERGENCY MEDICINE

## 2021-05-02 PROCEDURE — 96374 THER/PROPH/DIAG INJ IV PUSH: CPT

## 2021-05-02 PROCEDURE — 85025 COMPLETE CBC W/AUTO DIFF WBC: CPT | Performed by: EMERGENCY MEDICINE

## 2021-05-02 PROCEDURE — 80053 COMPREHEN METABOLIC PANEL: CPT | Performed by: EMERGENCY MEDICINE

## 2021-05-02 PROCEDURE — 83690 ASSAY OF LIPASE: CPT | Performed by: EMERGENCY MEDICINE

## 2021-05-02 PROCEDURE — 99283 EMERGENCY DEPT VISIT LOW MDM: CPT

## 2021-05-02 PROCEDURE — 96376 TX/PRO/DX INJ SAME DRUG ADON: CPT

## 2021-05-02 PROCEDURE — 81003 URINALYSIS AUTO W/O SCOPE: CPT | Performed by: EMERGENCY MEDICINE

## 2021-05-02 PROCEDURE — 25010000002 ONDANSETRON PER 1 MG: Performed by: EMERGENCY MEDICINE

## 2021-05-02 PROCEDURE — 85610 PROTHROMBIN TIME: CPT | Performed by: EMERGENCY MEDICINE

## 2021-05-02 PROCEDURE — 96375 TX/PRO/DX INJ NEW DRUG ADDON: CPT

## 2021-05-02 PROCEDURE — 76705 ECHO EXAM OF ABDOMEN: CPT

## 2021-05-02 PROCEDURE — 71275 CT ANGIOGRAPHY CHEST: CPT

## 2021-05-02 RX ORDER — HYDROCODONE BITARTRATE AND ACETAMINOPHEN 7.5; 325 MG/1; MG/1
1 TABLET ORAL EVERY 4 HOURS PRN
Qty: 10 TABLET | Refills: 0 | Status: SHIPPED | OUTPATIENT
Start: 2021-05-02 | End: 2021-05-04 | Stop reason: SDUPTHER

## 2021-05-02 RX ORDER — HYDROMORPHONE HYDROCHLORIDE 1 MG/ML
0.5 INJECTION, SOLUTION INTRAMUSCULAR; INTRAVENOUS; SUBCUTANEOUS ONCE
Status: COMPLETED | OUTPATIENT
Start: 2021-05-02 | End: 2021-05-02

## 2021-05-02 RX ORDER — ONDANSETRON 2 MG/ML
4 INJECTION INTRAMUSCULAR; INTRAVENOUS ONCE
Status: COMPLETED | OUTPATIENT
Start: 2021-05-02 | End: 2021-05-02

## 2021-05-02 RX ORDER — SODIUM CHLORIDE 0.9 % (FLUSH) 0.9 %
10 SYRINGE (ML) INJECTION AS NEEDED
Status: DISCONTINUED | OUTPATIENT
Start: 2021-05-02 | End: 2021-05-02 | Stop reason: HOSPADM

## 2021-05-02 RX ORDER — ONDANSETRON 4 MG/1
4 TABLET, FILM COATED ORAL EVERY 8 HOURS PRN
Qty: 10 TABLET | Refills: 0 | Status: SHIPPED | OUTPATIENT
Start: 2021-05-02 | End: 2021-06-17

## 2021-05-02 RX ORDER — ONDANSETRON 4 MG/1
4 TABLET, FILM COATED ORAL EVERY 8 HOURS PRN
Qty: 10 TABLET | Refills: 0 | Status: SHIPPED | OUTPATIENT
Start: 2021-05-02 | End: 2021-05-02 | Stop reason: SDUPTHER

## 2021-05-02 RX ORDER — HYDROCODONE BITARTRATE AND ACETAMINOPHEN 7.5; 325 MG/1; MG/1
1 TABLET ORAL EVERY 4 HOURS PRN
Qty: 10 TABLET | Refills: 0 | Status: SHIPPED | OUTPATIENT
Start: 2021-05-02 | End: 2021-05-02 | Stop reason: SDUPTHER

## 2021-05-02 RX ADMIN — IOPAMIDOL 95 ML: 755 INJECTION, SOLUTION INTRAVENOUS at 11:21

## 2021-05-02 RX ADMIN — SODIUM CHLORIDE 500 ML: 9 INJECTION, SOLUTION INTRAVENOUS at 10:09

## 2021-05-02 RX ADMIN — ONDANSETRON 4 MG: 2 INJECTION INTRAMUSCULAR; INTRAVENOUS at 10:09

## 2021-05-02 RX ADMIN — HYDROMORPHONE HYDROCHLORIDE 0.5 MG: 1 INJECTION, SOLUTION INTRAMUSCULAR; INTRAVENOUS; SUBCUTANEOUS at 10:09

## 2021-05-02 RX ADMIN — IOPAMIDOL 99 ML: 755 INJECTION, SOLUTION INTRAVENOUS at 11:12

## 2021-05-02 RX ADMIN — HYDROMORPHONE HYDROCHLORIDE 1 MG: 1 INJECTION, SOLUTION INTRAMUSCULAR; INTRAVENOUS; SUBCUTANEOUS at 11:34

## 2021-05-02 NOTE — ED TRIAGE NOTES
Pt reports right sided abd. Pain x 1 week increased in severity x 2 days. Pt reports diarrhea. Denies nausea or vomiting.     Patient masked in first look triage. I was wearing mask and goggles.

## 2021-05-02 NOTE — ED PROVIDER NOTES
EMERGENCY DEPARTMENT ENCOUNTER    Room Number:  12/12  Date of encounter:  5/2/2021  PCP: Narinder Ortiz MD  Historian: Patient      HPI:  Chief Complaint: Abdominal pain      Context: Qasim Ch is a 50 y.o. male who presents to the ED c/o intermittent right upper quadrant pain for the past week that has been worse for the past 2 days and specifically since early this morning.  The patient states he has a history of diverticulitis and was here at the end of February with a negative abdominal CT.  He has followed up with Dr. Ortiz about this with an unremarkable work-up.  Patient states he has had nausea and diarrhea but no fevers, chills, chest pain, headache, sore throat, cough, shortness of breath, known COVID-19 exposure, lower extremity pain, lower extremity swelling or focal neuro deficit      PAST MEDICAL HISTORY  Active Ambulatory Problems     Diagnosis Date Noted   • Degeneration of intervertebral disc of lumbar region 03/16/2016   • Diabetes mellitus, type II (CMS/HCC) 06/09/2016   • Hypertension 01/14/2013   • Hypertriglyceridemia 01/14/2013   • Hypogonadism 02/07/2013   • Impotence of organic origin 02/22/2012   • Irritable bowel syndrome 01/14/2013   • Spinal stenosis of lumbar region without neurogenic claudication 09/05/2018   • Obesity 03/07/2017   • Obstructive sleep apnea syndrome 11/09/2018   • Polycythemia 02/22/2012   • Steatosis of liver 01/14/2013   • Ventral hernia without obstruction or gangrene 02/14/2018   • Gastroesophageal reflux disease without esophagitis 05/05/2020   • History of colon polyps 12/04/2020     Resolved Ambulatory Problems     Diagnosis Date Noted   • Diverticulitis of colon 04/18/2014     Past Medical History:   Diagnosis Date   • Colon polyps    • DDD (degenerative disc disease), lumbar    • Diabetes (CMS/HCC)    • Diverticulitis 09/2016   • Fatty liver    • GERD (gastroesophageal reflux disease)    • Hyperlipidemia    • Lumbar compression fracture (CMS/HCC)    •  Lumbar radiculopathy    • WOLF (obstructive sleep apnea)    • Plantar fasciitis 2016   • Spondylolisthesis          PAST SURGICAL HISTORY  Past Surgical History:   Procedure Laterality Date   • ANAL FISSURECTOMY N/A 04/15/2009    DR.RAYMOND MICHAELS AT [facility]   • COLON SURGERY N/A 2016    SIGMOID COLECTOMY, D/T DIVERTICULITIS, DR. TYLER DUNBAR AT [facility]   • COLONOSCOPY N/A 04/15/2009    MIXED ADENOMATOUS POLYP IN RECTUM, DR. HERLINDA MICHAELS AT [facility]   • COLONOSCOPY N/A 2021    7 MM HYPERPLASTIC POLYP IN RECTUM, MULTIPLE SMALL AND LARGE DIVERTICULA IN ENTIRE COLON, RESCOPE IN 5 YRS, DR. SERA LE AT [facility]   • COLONOSCOPY W/ POLYPECTOMY N/A 2013    2 TUBULOVILLOUS ADENOMA POLYPS IN CECUM, 8 MM TUBULOVILLOUS ADENOMA POLYP IN ASCENDING, 14 MM TUBULOVILLOUS ADENOMA POLYP IN DISTAL ASCENDING, 5 MM TUBULAR ADENOMA POLYP IN ASCENDING, 5 MM BENIGN POLYP IN RECTUM, RESCOPE IN 2 YRS, DR. SERA LE AT [facility]   • ENDOSCOPY AND COLONOSCOPY N/A    • HEMORRHOIDECTOMY N/A 04/15/2009    DR.RAYMOND MICHAELS AT [facility]   • HERNIA REPAIR Left 2017    lap left inguinal hernia repair, ventral hernia repair, DR. TYLER DUNBAR AT [facility]   • LUMBAR EPIDURAL INJECTION N/A     lumbar   • LUMBAR SPINE POSTERIOR N/A 2018    TLIF L3-L5, DR. MOHAMMAD MAJD AT [facility]    • TONSILLECTOMY Bilateral    • VENTRAL HERNIA REPAIR N/A 2018    LAPAROSCOPIC WITH PREVIOUS MESH REMOVAL, DR. TYLER DUNBAR AT [facility]         FAMILY HISTORY  Family History   Problem Relation Age of Onset   • Colon cancer Father          SOCIAL HISTORY  Social History     Socioeconomic History   • Marital status:      Spouse name: Not on file   • Number of children: Not on file   • Years of education: Not on file   • Highest education level: Not on file   Tobacco Use   • Smoking status: Former Smoker     Packs/day: 1.00     Years: 10.00     Pack years: 10.00     Types: Cigarettes     Quit date:      Years since quittin.3   • Smokeless  tobacco: Current User     Types: Snuff   Substance and Sexual Activity   • Alcohol use: Not Currently   • Drug use: No   • Sexual activity: Defer         ALLERGIES  Penicillin g        REVIEW OF SYSTEMS  Review of Systems         All systems reviewed and negative except for those discussed in HPI.     PHYSICAL EXAM    I have reviewed the triage vital signs and nursing notes.    ED Triage Vitals   Temp Heart Rate Resp BP SpO2   05/02/21 0918 05/02/21 0918 05/02/21 0918 05/02/21 0931 05/02/21 0918   97.9 °F (36.6 °C) 90 18 144/96 95 %      Temp src Heart Rate Source Patient Position BP Location FiO2 (%)   -- -- -- 05/02/21 0931 --      Right arm        GENERAL: 50-year-old well developed, well nourished in mild distress  HENT: NCAT, neck supple, trachea midline  EYES: no scleral icterus, PERRL, normal conjunctiva  CV: regular rhythm, regular rate, no murmur  RESPIRATORY: unlabored effort, CTAB  ABDOMEN: soft, right upper quadrant tenderness with guarding but no rebound and diminished bowel sounds  MUSCULOSKELETAL: no gross deformity, no pedal edema, no calf tenderness  NEURO: alert,  sensory and motor function of extremities grossly intact, speech clear, mental status normal  SKIN: warm, dry, no rash  PSYCH:  Appropriate mood and affect      PPE  Pt does not present with symptoms for COVID19; however, I was wearing a mask and goggles throughout all patient interaction.    Vital signs and nursing notes reviewed.      LAB RESULTS  Recent Results (from the past 24 hour(s))   Urinalysis With Microscopic If Indicated (No Culture) - Urine, Clean Catch    Collection Time: 05/02/21  9:26 AM    Specimen: Urine, Clean Catch   Result Value Ref Range    Color, UA Yellow Yellow, Straw    Appearance, UA Clear Clear    pH, UA <=5.0 5.0 - 8.0    Specific Gravity, UA 1.020 1.005 - 1.030    Glucose, UA Negative Negative    Ketones, UA Negative Negative    Bilirubin, UA Negative Negative    Blood, UA Negative Negative    Protein, UA  Negative Negative    Leuk Esterase, UA Negative Negative    Nitrite, UA Negative Negative    Urobilinogen, UA 0.2 E.U./dL 0.2 - 1.0 E.U./dL   Comprehensive Metabolic Panel    Collection Time: 05/02/21  9:32 AM    Specimen: Blood   Result Value Ref Range    Glucose 114 (H) 65 - 99 mg/dL    BUN 13 6 - 20 mg/dL    Creatinine 0.67 (L) 0.76 - 1.27 mg/dL    Sodium 137 136 - 145 mmol/L    Potassium 4.2 3.5 - 5.2 mmol/L    Chloride 102 98 - 107 mmol/L    CO2 24.3 22.0 - 29.0 mmol/L    Calcium 9.5 8.6 - 10.5 mg/dL    Total Protein 7.4 6.0 - 8.5 g/dL    Albumin 4.10 3.50 - 5.20 g/dL    ALT (SGPT) 27 1 - 41 U/L    AST (SGOT) 26 1 - 40 U/L    Alkaline Phosphatase 54 39 - 117 U/L    Total Bilirubin 0.4 0.0 - 1.2 mg/dL    eGFR Non African Amer 127 >60 mL/min/1.73    Globulin 3.3 gm/dL    A/G Ratio 1.2 g/dL    BUN/Creatinine Ratio 19.4 7.0 - 25.0    Anion Gap 10.7 5.0 - 15.0 mmol/L   Lipase    Collection Time: 05/02/21  9:32 AM    Specimen: Blood   Result Value Ref Range    Lipase 20 13 - 60 U/L   Light Blue Top    Collection Time: 05/02/21  9:32 AM   Result Value Ref Range    Extra Tube hold for add-on    Green Top (Gel)    Collection Time: 05/02/21  9:32 AM   Result Value Ref Range    Extra Tube Hold for add-ons.    Lavender Top    Collection Time: 05/02/21  9:32 AM   Result Value Ref Range    Extra Tube hold for add-on    Gold Top - SST    Collection Time: 05/02/21  9:32 AM   Result Value Ref Range    Extra Tube Hold for add-ons.    CBC Auto Differential    Collection Time: 05/02/21  9:32 AM    Specimen: Blood   Result Value Ref Range    WBC 6.55 3.40 - 10.80 10*3/mm3    RBC 4.39 4.14 - 5.80 10*6/mm3    Hemoglobin 13.8 13.0 - 17.7 g/dL    Hematocrit 39.9 37.5 - 51.0 %    MCV 90.9 79.0 - 97.0 fL    MCH 31.4 26.6 - 33.0 pg    MCHC 34.6 31.5 - 35.7 g/dL    RDW 13.0 12.3 - 15.4 %    RDW-SD 43.1 37.0 - 54.0 fl    MPV 10.1 6.0 - 12.0 fL    Platelets 187 140 - 450 10*3/mm3    Neutrophil % 56.1 42.7 - 76.0 %    Lymphocyte % 29.8 19.6 -  45.3 %    Monocyte % 10.7 5.0 - 12.0 %    Eosinophil % 2.6 0.3 - 6.2 %    Basophil % 0.6 0.0 - 1.5 %    Immature Grans % 0.2 0.0 - 0.5 %    Neutrophils, Absolute 3.68 1.70 - 7.00 10*3/mm3    Lymphocytes, Absolute 1.95 0.70 - 3.10 10*3/mm3    Monocytes, Absolute 0.70 0.10 - 0.90 10*3/mm3    Eosinophils, Absolute 0.17 0.00 - 0.40 10*3/mm3    Basophils, Absolute 0.04 0.00 - 0.20 10*3/mm3    Immature Grans, Absolute 0.01 0.00 - 0.05 10*3/mm3    nRBC 0.0 0.0 - 0.2 /100 WBC   Protime-INR    Collection Time: 05/02/21  9:32 AM    Specimen: Blood   Result Value Ref Range    Protime 13.8 11.7 - 14.2 Seconds    INR 1.08 0.90 - 1.10       Ordered the above labs and independently reviewed the results.        RADIOLOGY  US Gallbladder    Result Date: 5/2/2021  GALLBLADDER ULTRASOUND  HISTORY: Right upper quadrant pain.  FINDINGS: The examination was performed as an emergency procedure. The gallbladder shows no evidence of gallstones or wall thickening. The common bile duct is slightly prominent in size, measuring 7 mm and this shows no change from the CT scan dated 02/28/2021. No stone is seen on the CT scan or on today's ultrasound exam. There is no dilatation of the intrahepatic bile ducts.  The visualized liver and pancreas and right kidney are unremarkable.  CONCLUSION: Essentially negative gallbladder ultrasound. The common bile duct is slightly prominent in size but this shows no change from the CT scan dated 02/28/2021 and no abnormality or gallstone is seen at the ampulla by either CT scan or ultrasound.  This report was finalized on 5/2/2021 1:45 PM by Dr. Jerardo Vaughan M.D.      CT Angiogram Chest, CT Abdomen Pelvis With Contrast    Result Date: 5/2/2021  CT ANGIOGRAPHY OF THE CHEST WITH INTRAVENOUS CONTRAST AND 3-D RECONSTRUCTIONS AND CT SCANS OF THE ABDOMEN AND PELVIS WITH INTRAVENOUS CONTRAST  HISTORY: Chest pain. Right lower quadrant pain.  The CT scan was performed as an emergency procedure with CT angiography  protocol through the chest with intravenous contrast and 3-D reconstructions followed by CT imaging through the abdomen and pelvis with intravenous contrast. The following findings are present: 1. Two contrast injections through the chest were obtained because of initial suboptimal imaging. With a second injection there is improved opacification of the pulmonary arteries and there is no evidence of pulmonary embolus. The thoracic aorta shows no evidence of aneurysm or dissection. 2. There is some minimal linear atelectasis at the right base. The lungs are otherwise clear. There is no mediastinal or hilar or axillary adenopathy. There is no pericardial effusion. 3. The liver, pancreas, both adrenal glands, and both kidneys appear normal. The gallbladder is somewhat prominent in size but this is unchanged from 02/28/2021. No gallstones are seen and there is no wall thickening. There is slight enlargement of the spleen measuring 15.9 cm in length and this is unchanged from 02/28/2021. 4. There is no aortic aneurysm or retroperitoneal lymphadenopathy. There are several slightly enlarged lymph nodes along the celiac axis measuring up to 2.3 cm in diameter. These are unchanged from the previous exam and remain nonspecific. This combined with the slightly enlarged spleen does raise the concern of a lymphoproliferative disorder and further clinical correlation is therefore recommended. 5. The large and small bowel loops are normal in caliber. There is no inflammatory change. The appendix appears normal. There is a sigmoid anastomosis and no complicating features are seen. In the pelvis the urinary bladder has a smooth contour.      Radiation dose reduction techniques were utilized, including automated exposure control and exposure modulation based on body size.  This report was finalized on 5/2/2021 1:45 PM by Dr. Jerardo Vaughan M.D.        I ordered the above noted radiological studies. Independently reviewed by me and  discussed with radiologist.  See dictation above for official radiology interpretation.      PROCEDURES    Procedures        MEDICATIONS GIVEN IN ER    Medications   sodium chloride 0.9 % flush 10 mL (has no administration in time range)   sodium chloride 0.9 % bolus 500 mL (0 mL Intravenous Stopped 5/2/21 1131)   HYDROmorphone (DILAUDID) injection 0.5 mg (0.5 mg Intravenous Given 5/2/21 1009)   ondansetron (ZOFRAN) injection 4 mg (4 mg Intravenous Given 5/2/21 1009)   HYDROmorphone (DILAUDID) injection 1 mg (1 mg Intravenous Given 5/2/21 1134)   iopamidol (ISOVUE-370) 76 % injection 100 mL (99 mL Intravenous Given by Other 5/2/21 1112)   iopamidol (ISOVUE-370) 76 % injection 100 mL (95 mL Intravenous Given by Other 5/2/21 1121)         PROGRESS, DATA ANALYSIS, CONSULTS, AND MEDICAL DECISION MAKING    All labs have been independently reviewed by me.  All radiology studies have been reviewed by me and discussed with radiologist dictating report.   EKG's independently reviewed by me.  Discussion below represents my analysis of pertinent findings related to patient's condition, differential diagnosis, treatment plan and final disposition.      ED Course as of May 02 1422   Sun May 02, 2021   1007 I will treat the patient with pain medicine, IV fluids and pain control while obtaining an ultrasound of the gallbladder and reviewing his abdominal CT from last month.    [GP]   1047 Gallbladder ultrasound was negative.  The patient still has severe right upper quadrant pain that he says is worse with deep breath and thus I will CTA his chest/abdomen/pelvis for further evaluation.  The patient states he is still in pain and thus I will repeat his pain medicine.    [GP]   1230 The patient is now resting comfortably no distress.  I advised him that his lab work, ultrasound, CTA chest and CT abdomen pelvis are negative acute.  I believe the patient likely will need a HIDA scan for further evaluation.  I told him I will prescribe  him a short course of pain medications, nausea medicine and that he should avoid fried or fatty foods.  I asked him to follow-up with his doctor this week to schedule his HIDA scan.  I also mentioned to him that he has splenomegaly and this needs to be followed as well.  The patient understands and agrees with the plan.    [GP]      ED Course User Index  [GP] Mushtaq Perrin MD           The differential diagnosis includes but is not limited to gastritis, pancreatitis, cholecystitis, appendicitis, diverticulitis, urinary tract infection, kidney stone, or bowel obstruction.          AS OF 14:22 EDT VITALS:    BP - 124/73  HR - 76  TEMP - 97.9 °F (36.6 °C)  02 SATS - 95%        DIAGNOSIS  Final diagnoses:   Right upper quadrant pain         DISPOSITION  DISCHARGE    Patient discharged in stable condition.    Reviewed implications of results, diagnosis, meds, responsibility to follow up, warning signs and symptoms of possible worsening, potential complications and reasons to return to ER, including increased pain, intractable vomiting or fever.    Patient/Family voiced understanding of above instructions.    Discussed plan for discharge, as there is no emergent indication for admission.  Pt/family is agreeable and understands need for follow up and repeat testing.  Pt is aware that discharge does not mean that nothing is wrong but it indicates no emergency is present and they must continue care with follow-up as given below or physician of their choice.     FOLLOW-UP  Narinder Ortiz MD  [address] [id number] [phone]    In 1 week  For recheck              EMR Dragon/Transcription disclaimer:   Much of this encounter note is an electronic transcription/translation of spoken language to printed text.        Mushtaq Perrin MD  05/02/21 8130

## 2021-05-02 NOTE — DISCHARGE INSTRUCTIONS
Avoid fried or fatty foods.  Use medications as directed.  Call Dr. Ortiz tomorrow to schedule a HIDA scan.  Return to the emergency room if worse.

## 2021-05-03 ENCOUNTER — TELEPHONE (OUTPATIENT)
Dept: INTERNAL MEDICINE | Age: 51
End: 2021-05-03

## 2021-05-03 NOTE — TELEPHONE ENCOUNTER
Caller: Qasim Ch    Relationship: Self    Best call back number: [phone] (H)    What orders are you requesting (i.e. lab or imaging): HIDA SCAN FOR GALLBLADDER    In what timeframe would the patient need to come in: ASAP    Where will you receive your lab/imaging services: UNKNOWN    Additional notes: PATIENT WENT TO ED AT [facility] ON 05/02/2021 AND WAS TOLD TO FOLLOW UP WITH PCP FOR THIS SCAN, PLEASE ADVISE PATIENT WHEN AND WHERE TO GO FOR SCAN

## 2021-05-04 ENCOUNTER — OFFICE VISIT (OUTPATIENT)
Dept: INTERNAL MEDICINE | Age: 51
End: 2021-05-04

## 2021-05-04 VITALS
HEART RATE: 82 BPM | DIASTOLIC BLOOD PRESSURE: 72 MMHG | TEMPERATURE: 97.3 F | SYSTOLIC BLOOD PRESSURE: 130 MMHG | WEIGHT: 273 LBS | BODY MASS INDEX: 36.18 KG/M2 | OXYGEN SATURATION: 98 % | HEIGHT: 73 IN

## 2021-05-04 DIAGNOSIS — R10.11 RIGHT UPPER QUADRANT PAIN: ICD-10-CM

## 2021-05-04 DIAGNOSIS — R10.11 RUQ PAIN: ICD-10-CM

## 2021-05-04 DIAGNOSIS — R16.1 SPLENOMEGALY: Primary | ICD-10-CM

## 2021-05-04 PROCEDURE — 99214 OFFICE O/P EST MOD 30 MIN: CPT | Performed by: NURSE PRACTITIONER

## 2021-05-04 RX ORDER — HYDROCODONE BITARTRATE AND ACETAMINOPHEN 7.5; 325 MG/1; MG/1
1 TABLET ORAL EVERY 4 HOURS PRN
Qty: 10 TABLET | Refills: 0 | Status: SHIPPED | OUTPATIENT
Start: 2021-05-04 | End: 2021-08-16

## 2021-05-04 NOTE — PROGRESS NOTES
"[facility]  DIMPLE Armando Ch / 50 y.o. / male  05/04/2021      CC:   Abdominal Pain (ER f/u RUQ pain, HIDA scan needed)      VITALS    Visit Vitals  /72   Pulse 82   Temp 97.3 °F (36.3 °C) (Temporal)   Ht 185.4 cm (73\")   Wt 124 kg (273 lb)   SpO2 98%   BMI 36.02 kg/m²       BP Readings from Last 3 Encounters:   05/04/21 130/72   05/02/21 124/73   03/11/21 142/82     Wt Readings from Last 3 Encounters:   05/04/21 124 kg (273 lb)   03/11/21 125 kg (275 lb)   02/28/21 123 kg (271 lb 6.4 oz)      Body mass index is 36.02 kg/m².    HPI:     Date of emergency room visit: May 2, 2021  Hospital: [facility]  Principle Dx: Right upper quadrant pain  Secondary Dx:   History prior to hospitalization: Presented to the emergency room due to intermittent right upper quadrant pain for the past week which is worsened over the last 2 days.  History of diverticulitis and was also at the emergency room the end of February with negative abdominal CT.  Unremarkable GI work-up performed by PCP at that time.  No nausea, diarrhea, fever, chills, chest pain, shortness of breath or neuro defects at the time of the our visit.  Evaluation/Treatment: Ultrasound of the gallbladder was performed which shows no evidence of gallstones or wall thickening.  Common bile duct slightly prominent in size but was unchanged from CT in February 2021.  Liver and pancreas and right kidney were unremarkable.  CT angio chest and CT abdomen pelvis with contrast were also performed.  No pulmonary embolus, no aneurysm or dissection, no atelectasis in the right lung base, no hilar adenopathy.  No pericardial effusion.  Liver, pancreas, adrenal glands and both kidneys are normal.  Gallbladder is again prominent in size but unchanged from February 2021.  Enlargement of the spleen was seen at 15.9 cm in length which was unchanged from February 2021.  However, slightly enlarged lymph nodes were along the celiac " axis measuring 2.3 cm in diameter.  According to radiology enlarged spleen and lymph nodes do raise concern for lymphoproliferative disorder.  Work-up was advised.  Patient was given Norco seven-point 7.5-325 for pain control.    Course: Today, he is still having right upper quadrant pain approximately 6-7 out of 10.  Is only taking the Norco at nighttime as he is a  and works during the day.  In ER visit they also recommended HIDA scan in which he is here for order today.  Continues to deny any nausea, vomiting, diarrhea, blood in his stool or dark stools.  No fever.  Visibly looks uncomfortable due to pain in right upper quadrant.    Patient Care Team:  Narinder Ortiz MD as PCP - General (Internal Medicine)  ____________________________________________________________________    ASSESSMENT & PLAN:    1. Splenomegaly    2. RUQ pain    3. Right upper quadrant pain      Orders Placed This Encounter   Procedures   • NM HIDA scan with pharmacological intervention   • Sedimentation rate, automated   • Uric acid   • Lactate Dehydrogenase   • Cytomegalovirus Antibody, IgG   • EBV Antibody VCA, IgG   • Comprehensive Metabolic Panel   • Compliance Drug Analysis, Ur - Urine, Clean Catch       Summary/Discussion:  · Order for HIDA scan to schedule within 1 to 2 days if possible  · Eze reviewed, UDS obtained, and controlled substance contract signed for 10 additional pills of Norco for pain control  · Beginning work-up for lymphoproliferative disorders, and encouraged follow-up within the month for further work-up of splenomegaly.  · Present to ER for increasing pain or intractable vomiting or fever.    Return in about 1 week (around 5/11/2021) for Next scheduled follow up Dr. Ortiz .    ____________________________________________________________________    REVIEW OF SYSTEMS    Review of Systems  Constitutional neg except per HPI   Resp neg  CV neg  GI RUQ pain    PHYSICAL EXAMINATION    Physical  Exam  Constitutional  No distress  Cardiovascular Rate  normal . Rhythm: regular . Heart sounds:  normal  GI tenderness RUQ  Pulmonary/Chest  Effort normal. Breath sounds:  normal  Psychiatric  Alert. Judgment and thought content normal. Mood normal     REVIEWED DATA:    Labs:   Admission on 05/02/2021, Discharged on 05/02/2021   Component Date Value Ref Range Status   • Glucose 05/02/2021 114* 65 - 99 mg/dL Final   • BUN 05/02/2021 13  6 - 20 mg/dL Final   • Creatinine 05/02/2021 0.67* 0.76 - 1.27 mg/dL Final   • Sodium 05/02/2021 137  136 - 145 mmol/L Final   • Potassium 05/02/2021 4.2  3.5 - 5.2 mmol/L Final   • Chloride 05/02/2021 102  98 - 107 mmol/L Final   • CO2 05/02/2021 24.3  22.0 - 29.0 mmol/L Final   • Calcium 05/02/2021 9.5  8.6 - 10.5 mg/dL Final   • Total Protein 05/02/2021 7.4  6.0 - 8.5 g/dL Final   • Albumin 05/02/2021 4.10  3.50 - 5.20 g/dL Final   • ALT (SGPT) 05/02/2021 27  1 - 41 U/L Final   • AST (SGOT) 05/02/2021 26  1 - 40 U/L Final   • Alkaline Phosphatase 05/02/2021 54  39 - 117 U/L Final   • Total Bilirubin 05/02/2021 0.4  0.0 - 1.2 mg/dL Final   • eGFR Non African Amer 05/02/2021 127  >60 mL/min/1.73 Final   • Globulin 05/02/2021 3.3  gm/dL Final   • A/G Ratio 05/02/2021 1.2  g/dL Final   • BUN/Creatinine Ratio 05/02/2021 19.4  7.0 - 25.0 Final   • Anion Gap 05/02/2021 10.7  5.0 - 15.0 mmol/L Final   • Lipase 05/02/2021 20  13 - 60 U/L Final   • Color, UA 05/02/2021 Yellow  Yellow, Straw Final   • Appearance, UA 05/02/2021 Clear  Clear Final   • pH, UA 05/02/2021 <=5.0  5.0 - 8.0 Final   • Specific Gravity, UA 05/02/2021 1.020  1.005 - 1.030 Final   • Glucose, UA 05/02/2021 Negative  Negative Final   • Ketones, UA 05/02/2021 Negative  Negative Final   • Bilirubin, UA 05/02/2021 Negative  Negative Final   • Blood, UA 05/02/2021 Negative  Negative Final   • Protein, UA 05/02/2021 Negative  Negative Final   • Leuk Esterase, UA 05/02/2021 Negative  Negative Final   • Nitrite, UA 05/02/2021  Negative  Negative Final   • Urobilinogen, UA 05/02/2021 0.2 E.U./dL  0.2 - 1.0 E.U./dL Final   • Extra Tube 05/02/2021 hold for add-on   Final    Auto resulted   • Extra Tube 05/02/2021 Hold for add-ons.   Final    Auto resulted.   • Extra Tube 05/02/2021 hold for add-on   Final    Auto resulted   • Extra Tube 05/02/2021 Hold for add-ons.   Final    Auto resulted.   • WBC 05/02/2021 6.55  3.40 - 10.80 10*3/mm3 Final   • RBC 05/02/2021 4.39  4.14 - 5.80 10*6/mm3 Final   • Hemoglobin 05/02/2021 13.8  13.0 - 17.7 g/dL Final   • Hematocrit 05/02/2021 39.9  37.5 - 51.0 % Final   • MCV 05/02/2021 90.9  79.0 - 97.0 fL Final   • MCH 05/02/2021 31.4  26.6 - 33.0 pg Final   • MCHC 05/02/2021 34.6  31.5 - 35.7 g/dL Final   • RDW 05/02/2021 13.0  12.3 - 15.4 % Final   • RDW-SD 05/02/2021 43.1  37.0 - 54.0 fl Final   • MPV 05/02/2021 10.1  6.0 - 12.0 fL Final   • Platelets 05/02/2021 187  140 - 450 10*3/mm3 Final   • Neutrophil % 05/02/2021 56.1  42.7 - 76.0 % Final   • Lymphocyte % 05/02/2021 29.8  19.6 - 45.3 % Final   • Monocyte % 05/02/2021 10.7  5.0 - 12.0 % Final   • Eosinophil % 05/02/2021 2.6  0.3 - 6.2 % Final   • Basophil % 05/02/2021 0.6  0.0 - 1.5 % Final   • Immature Grans % 05/02/2021 0.2  0.0 - 0.5 % Final   • Neutrophils, Absolute 05/02/2021 3.68  1.70 - 7.00 10*3/mm3 Final   • Lymphocytes, Absolute 05/02/2021 1.95  0.70 - 3.10 10*3/mm3 Final   • Monocytes, Absolute 05/02/2021 0.70  0.10 - 0.90 10*3/mm3 Final   • Eosinophils, Absolute 05/02/2021 0.17  0.00 - 0.40 10*3/mm3 Final   • Basophils, Absolute 05/02/2021 0.04  0.00 - 0.20 10*3/mm3 Final   • Immature Grans, Absolute 05/02/2021 0.01  0.00 - 0.05 10*3/mm3 Final   • nRBC 05/02/2021 0.0  0.0 - 0.2 /100 WBC Final   • Protime 05/02/2021 13.8  11.7 - 14.2 Seconds Final   • INR 05/02/2021 1.08  0.90 - 1.10 Final       Imaging:   US Gallbladder    Result Date: 5/2/2021  Narrative: GALLBLADDER ULTRASOUND  HISTORY: Right upper quadrant pain.  FINDINGS: The  examination was performed as an emergency procedure. The gallbladder shows no evidence of gallstones or wall thickening. The common bile duct is slightly prominent in size, measuring 7 mm and this shows no change from the CT scan dated 02/28/2021. No stone is seen on the CT scan or on today's ultrasound exam. There is no dilatation of the intrahepatic bile ducts.  The visualized liver and pancreas and right kidney are unremarkable.  CONCLUSION: Essentially negative gallbladder ultrasound. The common bile duct is slightly prominent in size but this shows no change from the CT scan dated 02/28/2021 and no abnormality or gallstone is seen at the ampulla by either CT scan or ultrasound.  This report was finalized on 5/2/2021 1:45 PM by Dr. Jerardo Vaughan M.D.      CT Angiogram Chest, CT Abdomen Pelvis With Contrast    Result Date: 5/2/2021  Narrative: CT ANGIOGRAPHY OF THE CHEST WITH INTRAVENOUS CONTRAST AND 3-D RECONSTRUCTIONS AND CT SCANS OF THE ABDOMEN AND PELVIS WITH INTRAVENOUS CONTRAST  HISTORY: Chest pain. Right lower quadrant pain.  The CT scan was performed as an emergency procedure with CT angiography protocol through the chest with intravenous contrast and 3-D reconstructions followed by CT imaging through the abdomen and pelvis with intravenous contrast. The following findings are present: 1. Two contrast injections through the chest were obtained because of initial suboptimal imaging. With a second injection there is improved opacification of the pulmonary arteries and there is no evidence of pulmonary embolus. The thoracic aorta shows no evidence of aneurysm or dissection. 2. There is some minimal linear atelectasis at the right base. The lungs are otherwise clear. There is no mediastinal or hilar or axillary adenopathy. There is no pericardial effusion. 3. The liver, pancreas, both adrenal glands, and both kidneys appear normal. The gallbladder is somewhat prominent in size but this is unchanged from  02/28/2021. No gallstones are seen and there is no wall thickening. There is slight enlargement of the spleen measuring 15.9 cm in length and this is unchanged from 02/28/2021. 4. There is no aortic aneurysm or retroperitoneal lymphadenopathy. There are several slightly enlarged lymph nodes along the celiac axis measuring up to 2.3 cm in diameter. These are unchanged from the previous exam and remain nonspecific. This combined with the slightly enlarged spleen does raise the concern of a lymphoproliferative disorder and further clinical correlation is therefore recommended. 5. The large and small bowel loops are normal in caliber. There is no inflammatory change. The appendix appears normal. There is a sigmoid anastomosis and no complicating features are seen. In the pelvis the urinary bladder has a smooth contour.      Radiation dose reduction techniques were utilized, including automated exposure control and exposure modulation based on body size.  This report was finalized on 5/2/2021 1:45 PM by Dr. Jerardo Vaughan M.D.         Medical Tests:        Summary of old records / correspondence / consultant report:   DC summary re: issues addressed on HPI    Request outside records:       ALLERGIES  Allergies   Allergen Reactions   • Penicillin G Anaphylaxis        MEDICATIONS   Current Outpatient Medications   Medication Sig Dispense Refill   • ACETAMINOPHEN EXTRA STRENGTH PO As Needed.     • HYDROcodone-acetaminophen (NORCO) 7.5-325 MG per tablet Take 1 tablet by mouth Every 4 (Four) Hours As Needed for Moderate Pain . 10 tablet 0   • icosapent ethyl (Vascepa) 1 g capsule capsule Take 2 g by mouth 2 (Two) Times a Day With Meals. 120 capsule 3   • lisinopril (PRINIVIL,ZESTRIL) 10 MG tablet Take 1 tablet by mouth Daily. 90 tablet 3   • metFORMIN (Glucophage) 1000 MG tablet Take 1 tablet by mouth 2 (Two) Times a Day With Meals. 180 tablet 3   • multivitamin (MULTIPLE VITAMINS PO) Take 1 tablet by mouth Daily.     •  pantoprazole (PROTONIX) 40 MG EC tablet TAKE 1 TABLET BY MOUTH DAILY 90 tablet 1   • rosuvastatin (CRESTOR) 10 MG tablet TAKE 1 TABLET BY MOUTH EVERY NIGHT 90 tablet 3   • tiZANidine (Zanaflex) 4 MG tablet 1-2 po q hs for cramps 60 tablet 6   • ondansetron (Zofran) 4 MG tablet Take 1 tablet by mouth Every 8 (Eight) Hours As Needed for Nausea or Vomiting. 10 tablet 0     No current facility-administered medications for this visit.       Current outpatient and discharge medications have been reconciled for the patient.  Reviewed by: DIMPLE Armando       [facility]:     The following portions of the patient's history were reviewed and updated as appropriate: Allergies / Current Medications / Past Medical History / Surgical History / Social History / Family History    PROBLEM LIST   Patient Active Problem List   Diagnosis   • Degeneration of intervertebral disc of lumbar region   • Diabetes mellitus, type II (CMS/HCC)   • Hypertension   • Hypertriglyceridemia   • Hypogonadism   • Impotence of organic origin   • Irritable bowel syndrome   • Spinal stenosis of lumbar region without neurogenic claudication   • Obesity   • Obstructive sleep apnea syndrome   • Polycythemia   • Steatosis of liver   • Ventral hernia without obstruction or gangrene   • Gastroesophageal reflux disease without esophagitis   • History of colon polyps       PAST MEDICAL HISTORY  Past Medical History:   Diagnosis Date   • Colon polyps     FOLLOWED BY DR. SERA LE   • DDD (degenerative disc disease), lumbar    • Diabetes (CMS/HCC)     TYPE 2, NIDDM   • Diverticulitis 09/2016    SIGMOID   • Diverticulitis of colon 4/18/2014   • Fatty liver    • GERD (gastroesophageal reflux disease)    • Hyperlipidemia    • Hypertension    • Hypertriglyceridemia    • Hypogonadism 2/7/2013   • Impotence of organic origin 2/22/2012   • Irritable bowel syndrome 1/14/2013   • Lumbar compression fracture (CMS/HCC)     L4   • Lumbar radiculopathy    • Obesity    • WOLF  (obstructive sleep apnea)     cpap   • Plantar fasciitis 2016   • Polycythemia 2012   • Spinal stenosis of lumbar region without neurogenic claudication 2018   • Spondylolisthesis        SURGICAL HISTORY  Past Surgical History:   Procedure Laterality Date   • ANAL FISSURECTOMY N/A 04/15/2009    DR.RAYMOND MICHAELS AT [facility]   • COLON SURGERY N/A 2016    SIGMOID COLECTOMY, D/T DIVERTICULITIS, DR. TYLER DUNBAR AT [facility]   • COLONOSCOPY N/A 04/15/2009    MIXED ADENOMATOUS POLYP IN RECTUM, DR. HERLINDA MICHAELS AT [facility]   • COLONOSCOPY N/A 2021    7 MM HYPERPLASTIC POLYP IN RECTUM, MULTIPLE SMALL AND LARGE DIVERTICULA IN ENTIRE COLON, RESCOPE IN 5 YRS, DR. SERA LE AT [facility]   • COLONOSCOPY W/ POLYPECTOMY N/A 2013    2 TUBULOVILLOUS ADENOMA POLYPS IN CECUM, 8 MM TUBULOVILLOUS ADENOMA POLYP IN ASCENDING, 14 MM TUBULOVILLOUS ADENOMA POLYP IN DISTAL ASCENDING, 5 MM TUBULAR ADENOMA POLYP IN ASCENDING, 5 MM BENIGN POLYP IN RECTUM, RESCOPE IN 2 YRS, DR. SERA LE AT [facility]   • ENDOSCOPY AND COLONOSCOPY N/A    • HEMORRHOIDECTOMY N/A 04/15/2009    DR.RAYMOND MICHAELS AT [facility]   • HERNIA REPAIR Left 2017    lap left inguinal hernia repair, ventral hernia repair, DR. TYLER DUNBAR AT [facility]   • LUMBAR EPIDURAL INJECTION N/A     lumbar   • LUMBAR SPINE POSTERIOR N/A 2018    TLIF L3-L5, DR. MOHAMMAD MAJD AT [facility]    • TONSILLECTOMY Bilateral    • VENTRAL HERNIA REPAIR N/A 2018    LAPAROSCOPIC WITH PREVIOUS MESH REMOVAL, DR. TYLER DUNBAR AT [facility]       SOCIAL HISTORY  Social History     Socioeconomic History   • Marital status:      Spouse name: Not on file   • Number of children: Not on file   • Years of education: Not on file   • Highest education level: Not on file   Tobacco Use   • Smoking status: Former Smoker     Packs/day: 1.00     Years: 10.00     Pack years: 10.00     Types: Cigarettes     Quit date:      Years since quittin.3   • Smokeless tobacco: Current User      Types: Snuff   Vaping Use   • Vaping Use: Never used   Substance and Sexual Activity   • Alcohol use: Not Currently   • Drug use: No   • Sexual activity: Defer       FAMILY HISTORY  Family History   Problem Relation Age of Onset   • Colon cancer Father        Examiner was wearing KN95 mask, face shield and exam gloves during the entire duration of the visit. Patient was masked the entire time.   Minimum social distance of 6 ft maintained entire visit except if physical contact was necessary as documented.     **Dragon Disclaimer:   Much of this encounter note is an electronic transcription/translation of spoken language to printed text. The electronic translation of spoken language may permit erroneous, or at times, nonsensical words or phrases to be inadvertently transcribed. Although I have reviewed the note for such errors, some may still exist.

## 2021-05-05 LAB
ALBUMIN SERPL-MCNC: 4.7 G/DL (ref 3.5–5.2)
ALBUMIN/GLOB SERPL: 1.6 G/DL
ALP SERPL-CCNC: 64 U/L (ref 39–117)
ALT SERPL-CCNC: 26 U/L (ref 1–41)
AST SERPL-CCNC: 23 U/L (ref 1–40)
BILIRUB SERPL-MCNC: 0.6 MG/DL (ref 0–1.2)
BUN SERPL-MCNC: 9 MG/DL (ref 6–20)
BUN/CREAT SERPL: 14.3 (ref 7–25)
CALCIUM SERPL-MCNC: 10.2 MG/DL (ref 8.6–10.5)
CHLORIDE SERPL-SCNC: 101 MMOL/L (ref 98–107)
CMV IGG SERPL IA-ACNC: 9.1 U/ML (ref 0–0.59)
CO2 SERPL-SCNC: 28.8 MMOL/L (ref 22–29)
CREAT SERPL-MCNC: 0.63 MG/DL (ref 0.76–1.27)
EBV VCA IGG SER IA-ACNC: >600 U/ML (ref 0–17.9)
ERYTHROCYTE [SEDIMENTATION RATE] IN BLOOD BY WESTERGREN METHOD: 46 MM/HR (ref 0–15)
GLOBULIN SER CALC-MCNC: 3 GM/DL
GLUCOSE SERPL-MCNC: 76 MG/DL (ref 65–99)
LDH SERPL-CCNC: 144 U/L (ref 135–225)
POTASSIUM SERPL-SCNC: 4.5 MMOL/L (ref 3.5–5.2)
PROT SERPL-MCNC: 7.7 G/DL (ref 6–8.5)
SODIUM SERPL-SCNC: 140 MMOL/L (ref 136–145)
URATE SERPL-MCNC: 7 MG/DL (ref 3.4–7)

## 2021-05-09 LAB — DRUGS UR: NORMAL

## 2021-05-10 ENCOUNTER — HOSPITAL ENCOUNTER (OUTPATIENT)
Dept: NUCLEAR MEDICINE | Facility: HOSPITAL | Age: 51
Discharge: HOME | End: 2021-05-10

## 2021-05-10 DIAGNOSIS — K82.8 BILIARY DYSKINESIA: Primary | ICD-10-CM

## 2021-05-10 DIAGNOSIS — R10.11 RUQ PAIN: ICD-10-CM

## 2021-05-10 PROCEDURE — A9537 TC99M MEBROFENIN: HCPCS | Performed by: NURSE PRACTITIONER

## 2021-05-10 PROCEDURE — 0 TECHNETIUM TC 99M MEBROFENIN KIT: Performed by: NURSE PRACTITIONER

## 2021-05-10 PROCEDURE — 78227 HEPATOBIL SYST IMAGE W/DRUG: CPT

## 2021-05-10 RX ORDER — KIT FOR THE PREPARATION OF TECHNETIUM TC 99M MEBROFENIN 45 MG/10ML
1 INJECTION, POWDER, LYOPHILIZED, FOR SOLUTION INTRAVENOUS
Status: COMPLETED | OUTPATIENT
Start: 2021-05-10 | End: 2021-05-10

## 2021-05-10 RX ADMIN — MEBROFENIN 1 DOSE: 45 INJECTION, POWDER, LYOPHILIZED, FOR SOLUTION INTRAVENOUS at 09:30

## 2021-05-11 ENCOUNTER — TELEPHONE (OUTPATIENT)
Dept: INTERNAL MEDICINE | Age: 51
End: 2021-05-11

## 2021-05-11 NOTE — TELEPHONE ENCOUNTER
Pt is requested further detail into lab results. He understands that they are unremarkable but is wanting to to specifically what/why tests are being ran. He has looked these up and has concerns into possible viruses.  Please advise. MM

## 2021-05-11 NOTE — TELEPHONE ENCOUNTER
----- Message from DIMPLE Armando sent at 5/10/2021  4:18 PM EDT -----  HIDA scan is showing biliary dyskinesia, or slowed movement of the bile in the gallbladder.     I have placed a referral to general surgery. You will likely need gallbladder removal if symptoms persist.     Take care and feel better!

## 2021-05-20 ENCOUNTER — OFFICE VISIT (OUTPATIENT)
Dept: SURGERY | Facility: CLINIC | Age: 51
End: 2021-05-20

## 2021-05-20 VITALS — WEIGHT: 270.6 LBS | HEIGHT: 73 IN | BODY MASS INDEX: 35.86 KG/M2

## 2021-05-20 DIAGNOSIS — K82.8 BILIARY DYSKINESIA: Primary | ICD-10-CM

## 2021-05-20 PROCEDURE — 99243 OFF/OP CNSLTJ NEW/EST LOW 30: CPT | Performed by: SURGERY

## 2021-05-20 RX ORDER — CLINDAMYCIN PHOSPHATE 900 MG/50ML
900 INJECTION INTRAVENOUS ONCE
Status: CANCELLED | OUTPATIENT
Start: 2021-06-23 | End: 2021-05-20

## 2021-05-20 RX ORDER — FAMOTIDINE 20 MG/1
20 TABLET, FILM COATED ORAL 2 TIMES DAILY
COMMUNITY
End: 2021-06-17

## 2021-05-20 NOTE — PROGRESS NOTES
Subjective   Qasim Ch is a 51 y.o. male who presents to the office in surgical consultation from Narinder Ortiz MD for biliary dyskinesia.    History of Present Illness     The patient has a several month history of frequent vague abdominal pain that he attributed to indigestion after eating.  Symptoms are characterized by right upper quadrant abdominal pain but also bloating, nausea, and alternating diarrhea and constipation.  He had 2 significant episodes that prompted a presentation to the emergency room.  He states that if he eats steak he will always have the pain.  There are other things that he can eat that will cause the pain.  He has restricted his diet and has improved.  CT scan of the abdomen and pelvis was normal.  Right upper quadrant ultrasound was normal.  HIDA scan showed a decreased gallbladder ejection fraction of 14%.  The ingestion of the Ensure Plus caused some mild indigestion but not his typical symptoms.    Review of Systems   Constitutional: Negative for fatigue and fever.   Respiratory: Negative for chest tightness and shortness of breath.    Cardiovascular: Negative for chest pain and palpitations.   Gastrointestinal: Positive for abdominal pain, constipation, diarrhea and nausea. Negative for blood in stool and vomiting.     Past Medical History:   Diagnosis Date   • Colon polyps     FOLLOWED BY DR. SERA LE   • DDD (degenerative disc disease), lumbar    • Diabetes (CMS/HCC)     TYPE 2, NIDDM   • Diverticulitis 09/2016    SIGMOID   • Diverticulitis of colon 4/18/2014   • Fatty liver    • GERD (gastroesophageal reflux disease)    • Hyperlipidemia    • Hypertension    • Hypertriglyceridemia    • Hypogonadism 2/7/2013   • Impotence of organic origin 2/22/2012   • Irritable bowel syndrome 1/14/2013   • Lumbar compression fracture (CMS/HCC)     L4   • Lumbar radiculopathy    • Obesity    • WOLF (obstructive sleep apnea)     cpap   • Plantar fasciitis 6/9/2016   • Polycythemia  2012   • Spinal stenosis of lumbar region without neurogenic claudication 2018   • Spondylolisthesis      Past Surgical History:   Procedure Laterality Date   • ANAL FISSURECTOMY N/A 04/15/2009    [facility]   • COLON SURGERY N/A 2016    SIGMOID COLECTOMY, D/T DIVERTICULITIS, DR. TYLER DUNBAR AT [facility]   • COLONOSCOPY N/A 04/15/2009    MIXED ADENOMATOUS POLYP IN RECTUM, [facility]   • COLONOSCOPY N/A 2021    7 MM HYPERPLASTIC POLYP IN RECTUM, MULTIPLE SMALL AND LARGE DIVERTICULA IN ENTIRE COLON, RESCOPE IN 5 YRS, [facility]   • COLONOSCOPY W/ POLYPECTOMY N/A 2013    2 TUBULOVILLOUS ADENOMA POLYPS IN CECUM, 8 MM TUBULOVILLOUS ADENOMA POLYP IN ASCENDING, 14 MM TUBULOVILLOUS ADENOMA POLYP IN DISTAL ASCENDING, 5 MM TUBULAR ADENOMA POLYP IN ASCENDING, 5 MM BENIGN POLYP IN RECTUM, RESCOPE IN 2 YRS, [facility]   • ENDOSCOPY AND COLONOSCOPY N/A    • HEMORRHOIDECTOMY N/A 04/15/2009    [facility]   • HERNIA REPAIR Left 2017    lap left inguinal hernia repair, ventral hernia repair, DR. TYLER DUNBAR AT [facility]   • LUMBAR EPIDURAL INJECTION N/A     lumbar   • LUMBAR SPINE POSTERIOR N/A 2018    TLIF L3-L5, DR. MOHAMMAD MAJD AT [facility]    • TONSILLECTOMY Bilateral    • VENTRAL HERNIA REPAIR N/A 2018    LAPAROSCOPIC WITH PREVIOUS MESH REMOVAL, DR. TYLER DUNBAR AT [facility]     Family History   Problem Relation Age of Onset   • Colon cancer Father      Social History     Socioeconomic History   • Marital status:      Spouse name: Not on file   • Number of children: Not on file   • Years of education: Not on file   • Highest education level: Not on file   Tobacco Use   • Smoking status: Former Smoker     Packs/day: 1.00     Years: 10.00     Pack years: 10.00     Types: Cigarettes     Quit date:      Years since quittin.3   • Smokeless tobacco: Current User     Types: Chew   Vaping Use   • Vaping Use: Never  used   Substance and Sexual Activity   • Alcohol use: Not Currently   • Drug use: No   • Sexual activity: Defer       Objective   Physical Exam  Constitutional:       Appearance: Normal appearance. He is well-developed. He is not toxic-appearing.   Eyes:      General: No scleral icterus.  Pulmonary:      Effort: Pulmonary effort is normal. No respiratory distress.   Abdominal:      Palpations: Abdomen is soft.      Tenderness: There is no abdominal tenderness.   Skin:     General: Skin is warm and dry.   Neurological:      Mental Status: He is alert and oriented to person, place, and time.   Psychiatric:         Behavior: Behavior normal.         Thought Content: Thought content normal.         Judgment: Judgment normal.         Assessment/Plan       The encounter diagnosis was Biliary dyskinesia.    The patient has recurrent symptoms of right upper quadrant abdominal pain, nausea, bloating, and alternating constipation and diarrhea that seems to be postprandial in nature.  He has a HIDA scan that shows a decreased gallbladder ejection fraction of 14%.  All of his symptoms can be consistent with biliary dyskinesia.  This was discussed with him.  He will be scheduled for laparoscopic cholecystectomy with intraoperative cholangiogram.  The patient understands the indications, alternatives, risks, and benefits of the procedure and wishes to proceed.

## 2021-05-21 ENCOUNTER — TELEPHONE (OUTPATIENT)
Dept: SURGERY | Facility: CLINIC | Age: 51
End: 2021-05-21

## 2021-06-09 ENCOUNTER — TRANSCRIBE ORDERS (OUTPATIENT)
Dept: PREADMISSION TESTING | Facility: HOSPITAL | Age: 51
End: 2021-06-09

## 2021-06-17 ENCOUNTER — PRE-ADMISSION TESTING (OUTPATIENT)
Dept: PREADMISSION TESTING | Facility: HOSPITAL | Age: 51
End: 2021-06-17

## 2021-06-17 VITALS
WEIGHT: 278 LBS | HEART RATE: 74 BPM | RESPIRATION RATE: 16 BRPM | OXYGEN SATURATION: 98 % | DIASTOLIC BLOOD PRESSURE: 84 MMHG | SYSTOLIC BLOOD PRESSURE: 138 MMHG | TEMPERATURE: 97.4 F | HEIGHT: 73 IN | BODY MASS INDEX: 36.84 KG/M2

## 2021-06-17 LAB
ANION GAP SERPL CALCULATED.3IONS-SCNC: 14.8 MMOL/L (ref 5–15)
BUN SERPL-MCNC: 14 MG/DL (ref 6–20)
BUN/CREAT SERPL: 18.4 (ref 7–25)
CALCIUM SPEC-SCNC: 9.6 MG/DL (ref 8.6–10.5)
CHLORIDE SERPL-SCNC: 101 MMOL/L (ref 98–107)
CO2 SERPL-SCNC: 22.2 MMOL/L (ref 22–29)
CREAT SERPL-MCNC: 0.76 MG/DL (ref 0.76–1.27)
DEPRECATED RDW RBC AUTO: 44.6 FL (ref 37–54)
ERYTHROCYTE [DISTWIDTH] IN BLOOD BY AUTOMATED COUNT: 13.4 % (ref 12.3–15.4)
GFR SERPL CREATININE-BSD FRML MDRD: 109 ML/MIN/1.73
GLUCOSE SERPL-MCNC: 140 MG/DL (ref 65–99)
HCT VFR BLD AUTO: 41.5 % (ref 37.5–51)
HGB BLD-MCNC: 14.4 G/DL (ref 13–17.7)
MCH RBC QN AUTO: 31.3 PG (ref 26.6–33)
MCHC RBC AUTO-ENTMCNC: 34.7 G/DL (ref 31.5–35.7)
MCV RBC AUTO: 90.2 FL (ref 79–97)
PLATELET # BLD AUTO: 134 10*3/MM3 (ref 140–450)
PMV BLD AUTO: 10.8 FL (ref 6–12)
POTASSIUM SERPL-SCNC: 4.1 MMOL/L (ref 3.5–5.2)
QT INTERVAL: 381 MS
RBC # BLD AUTO: 4.6 10*6/MM3 (ref 4.14–5.8)
SODIUM SERPL-SCNC: 138 MMOL/L (ref 136–145)
WBC # BLD AUTO: 6.79 10*3/MM3 (ref 3.4–10.8)

## 2021-06-17 PROCEDURE — 93010 ELECTROCARDIOGRAM REPORT: CPT | Performed by: INTERNAL MEDICINE

## 2021-06-17 PROCEDURE — 36415 COLL VENOUS BLD VENIPUNCTURE: CPT

## 2021-06-17 PROCEDURE — 93005 ELECTROCARDIOGRAM TRACING: CPT

## 2021-06-17 PROCEDURE — 80048 BASIC METABOLIC PNL TOTAL CA: CPT

## 2021-06-17 PROCEDURE — 85027 COMPLETE CBC AUTOMATED: CPT

## 2021-06-17 RX ORDER — CHLORHEXIDINE GLUCONATE 500 MG/1
1 CLOTH TOPICAL
COMMUNITY
End: 2021-06-23 | Stop reason: HOSPADM

## 2021-06-17 NOTE — DISCHARGE INSTRUCTIONS
Take the following medications the morning of surgery:  PROTONIX      ARRIVAL TIME IS 0630 DAY OF SURGERY    COVID DRIVE THROUGH TEST IS June 22 AT 1240    BRING CPAP MACHINE      If you are on prescription narcotic pain medication to control your pain you may also take that medication the morning of surgery.    General Instructions:  • Do not eat solid food after midnight the night before surgery.  • You may drink clear liquids day of surgery but must stop at least one hour before your hospital arrival time.  • It is beneficial for you to have a clear drink that contains carbohydrates the day of surgery.  We suggest a 12 to 20 ounce bottle of Gatorade or Powerade for non-diabetic patients or a 12 to 20 ounce bottle of G2 or Powerade Zero for diabetic patients. (Pediatric patients, are not advised to drink a 12 to 20 ounce carbohydrate drink)    Clear liquids are liquids you can see through.  Nothing red in color.     Plain water                               Sports drinks  Sodas                                   Gelatin (Jell-O)  Fruit juices without pulp such as white grape juice and apple juice  Popsicles that contain no fruit or yogurt  Tea or coffee (no cream or milk added)  Gatorade / Powerade  G2 / Powerade Zero    • Infants may have breast milk up to four hours before surgery.  • Infants drinking formula may drink formula up to six hours before surgery.   • Patients who avoid smoking, chewing tobacco and alcohol for 4 weeks prior to surgery have a reduced risk of post-operative complications.  Quit smoking as many days before surgery as you can.  • Do not smoke, use chewing tobacco or drink alcohol the day of surgery.   • If applicable bring your C-PAP/ BI-PAP machine.  • Bring any papers given to you in the doctor’s office.  • Wear clean comfortable clothes.  • Do not wear contact lenses, false eyelashes or make-up.  Bring a case for your glasses.   • Bring crutches or walker if applicable.  • Remove all  piercings.  Leave jewelry and any other valuables at home.  • Hair extensions with metal clips must be removed prior to surgery.  • The Pre-Admission Testing nurse will instruct you to bring medications if unable to obtain an accurate list in Pre-Admission Testing.            Preventing a Surgical Site Infection:  • For 2 to 3 days before surgery, avoid shaving with a razor because the razor can irritate skin and make it easier to develop an infection.    • Any areas of open skin can increase the risk of a post-operative wound infection by allowing bacteria to enter and travel throughout the body.  Notify your surgeon if you have any skin wounds / rashes even if it is not near the expected surgical site.  The area will need assessed to determine if surgery should be delayed until it is healed.  • The night prior to surgery shower using a fresh bar of anti-bacterial soap (such as Dial) and clean washcloth.  Sleep in a clean bed with clean clothing.  Do not allow pets to sleep with you.  • Shower on the morning of surgery using a fresh bar of anti-bacterial soap (such as Dial) and clean washcloth.  Dry with a clean towel and dress in clean clothing.  • Ask your surgeon if you will be receiving antibiotics prior to surgery.  • Make sure you, your family, and all healthcare providers clean their hands with soap and water or an alcohol based hand  before caring for you or your wound.    Day of surgery:  Your arrival time is approximately two hours before your scheduled surgery time.  Upon arrival, a Pre-op nurse and Anesthesiologist will review your health history, obtain vital signs, and answer questions you may have.  The only belongings needed at this time will be a list of your home medications and if applicable your C-PAP/BI-PAP machine.  A Pre-op nurse will start an IV and you may receive medication in preparation for surgery, including something to help you relax.     Please be aware that surgery does  come with discomfort.  We want to make every effort to control your discomfort so please discuss any uncontrolled symptoms with your nurse.   Your doctor will most likely have prescribed pain medications.      If you are going home after surgery you will receive individualized written care instructions before being discharged.  A responsible adult must drive you to and from the hospital on the day of your surgery and stay with you for 24 hours.  Discharge prescriptions can be filled by the hospital pharmacy during regular pharmacy hours.  If you are having surgery late in the day/evening your prescription may be e-prescribed to your pharmacy.  Please verify your pharmacy hours or chose a 24 hour pharmacy to avoid not having access to your prescription because your pharmacy has closed for the day.    If you are staying overnight following surgery, you will be transported to your hospital room following the recovery period.  [facility] has all private rooms.    If you have any questions please call Pre-Admission Testing at [phone].  Deductibles and co-payments are collected on the day of service. Please be prepared to pay the required co-pay, deductible or deposit on the day of service as defined by your plan.    Patient Education for Self-Quarantine Process    Following your COVID testing, we strongly recommend that you do not leave your home after you have been tested for COVID except to get medical care. This includes not going to work, school or to public areas.  If this is not possible for you to do please limit your activities to only required outings.  Be sure to wear a mask when you are with other people, practice social distancing and wash your hands frequently.      The following items provide additional details to keep you safe.  • Wash your hands with soap and water frequently for at least 20 seconds.   • Avoid touching your eyes, nose and mouth with unwashed hands.  • Do not share  anything - utensils, towels, food from the same bowl.   • Have your own utensils, drinking glass, dishes, towels and bedding.   • Do not have visitors.   • Do use FaceTime to stay in touch with family and friends.  • You should stay in a specific room away from others if possible.   • Stay at least 6 feet away from others in the home if you cannot have a dedicated room to yourself.   • Do not snuggle with your pet. While the CDC says there is no evidence that pets can spread COVID-19 or be infected from humans, it is probably best to avoid “petting, snuggling, being kissed or licked and sharing food (during self-quarantine)”, according to the CDC.   • Sanitize household surfaces daily. Include all high touch areas (door handles, light switches, phones, countertops, etc.)  • Do not share a bathroom with others, if possible.   • Wear a mask around others in your home if you are unable to stay in a separate room or 6 feet apart. If  you are unable to wear a mask, have your family member wear a mask if they must be within 6 feet of you.   Call your surgeon immediately if you experience any of the following symptoms:  • Sore Throat  • Shortness of Breath or difficulty breathing  • Cough  • Chills  • Body soreness or muscle pain  • Headache  • Fever  • New loss of taste or smell  • Do not arrive for your surgery ill.  Your procedure will need to be rescheduled to another time.  You will need to call your physician before the day of surgery to avoid any unnecessary exposure to hospital staff as well as other patients.    CHLORHEXIDINE CLOTH INSTRUCTIONS  The morning of surgery follow these instructions using the Chlorhexidine cloths you've been given.  These steps reduce bacteria on the body.  Do not use the cloths near your eyes, ears mouth, genitalia or on open wounds.  Throw the cloths away after use but do not try to flush them down a toilet.      • Open and remove one cloth at a time from the package.    • Leave the  cloth unfolded and begin the bathing.  • Massage the skin with the cloths using gentle pressure to remove bacteria.  Do not scrub harshly.   • Follow the steps below with one 2% CHG cloth per area (6 total cloths).  • One cloth for neck, shoulders and chest.  • One cloth for both arms, hands, fingers and underarms (do underarms last).  • One cloth for the abdomen followed by groin.  • One cloth for right leg and foot including between the toes.  • One cloth for left leg and foot including between the toes.  • The last cloth is to be used for the back of the neck, back and buttocks.    Allow the CHG to air dry 3 minutes on the skin which will give it time to work and decrease the chance of irritation.  The skin may feel sticky until it is dry.  Do not rinse with water or any other liquid or you will lose the beneficial effects of the CHG.  If mild skin irritation occurs, do rinse the skin to remove the CHG.  Report this to the nurse at time of admission.  Do not apply lotions, creams, ointments, deodorants or perfumes after using the clothes. Dress in clean clothes before coming to the hospital.

## 2021-06-22 ENCOUNTER — LAB (OUTPATIENT)
Dept: LAB | Facility: HOSPITAL | Age: 51
End: 2021-06-22

## 2021-06-22 LAB — SARS-COV-2 ORF1AB RESP QL NAA+PROBE: NOT DETECTED

## 2021-06-22 PROCEDURE — C9803 HOPD COVID-19 SPEC COLLECT: HCPCS

## 2021-06-22 PROCEDURE — U0004 COV-19 TEST NON-CDC HGH THRU: HCPCS

## 2021-06-23 ENCOUNTER — ANESTHESIA EVENT (OUTPATIENT)
Dept: PERIOP | Facility: HOSPITAL | Age: 51
End: 2021-06-23

## 2021-06-23 ENCOUNTER — ANESTHESIA (OUTPATIENT)
Dept: PERIOP | Facility: HOSPITAL | Age: 51
End: 2021-06-23

## 2021-06-23 ENCOUNTER — APPOINTMENT (OUTPATIENT)
Dept: GENERAL RADIOLOGY | Facility: HOSPITAL | Age: 51
End: 2021-06-23

## 2021-06-23 ENCOUNTER — HOSPITAL ENCOUNTER (OUTPATIENT)
Facility: HOSPITAL | Age: 51
Setting detail: HOSPITAL OUTPATIENT SURGERY
Discharge: HOME | End: 2021-06-23
Attending: SURGERY | Admitting: SURGERY

## 2021-06-23 VITALS
WEIGHT: 273 LBS | OXYGEN SATURATION: 94 % | RESPIRATION RATE: 18 BRPM | HEIGHT: 73 IN | HEART RATE: 72 BPM | DIASTOLIC BLOOD PRESSURE: 81 MMHG | BODY MASS INDEX: 36.18 KG/M2 | TEMPERATURE: 98 F | SYSTOLIC BLOOD PRESSURE: 132 MMHG

## 2021-06-23 DIAGNOSIS — K82.8 BILIARY DYSKINESIA: ICD-10-CM

## 2021-06-23 LAB
GLUCOSE BLDC GLUCOMTR-MCNC: 114 MG/DL (ref 70–130)
GLUCOSE BLDC GLUCOMTR-MCNC: 129 MG/DL (ref 70–130)

## 2021-06-23 PROCEDURE — 25010000002 DEXAMETHASONE PER 1 MG: Performed by: STUDENT IN AN ORGANIZED HEALTH CARE EDUCATION/TRAINING PROGRAM

## 2021-06-23 PROCEDURE — 25010000002 ONDANSETRON PER 1 MG: Performed by: STUDENT IN AN ORGANIZED HEALTH CARE EDUCATION/TRAINING PROGRAM

## 2021-06-23 PROCEDURE — 74300 X-RAY BILE DUCTS/PANCREAS: CPT

## 2021-06-23 PROCEDURE — 25010000002 HYDROMORPHONE PER 4 MG: Performed by: STUDENT IN AN ORGANIZED HEALTH CARE EDUCATION/TRAINING PROGRAM

## 2021-06-23 PROCEDURE — 0 IOTHALAMATE 60 % SOLUTION 50 ML VIAL: Performed by: SURGERY

## 2021-06-23 PROCEDURE — S0260 H&P FOR SURGERY: HCPCS | Performed by: SURGERY

## 2021-06-23 PROCEDURE — 25010000002 NEOSTIGMINE 5 MG/10ML SOLUTION: Performed by: STUDENT IN AN ORGANIZED HEALTH CARE EDUCATION/TRAINING PROGRAM

## 2021-06-23 PROCEDURE — 25010000002 PROPOFOL 10 MG/ML EMULSION: Performed by: STUDENT IN AN ORGANIZED HEALTH CARE EDUCATION/TRAINING PROGRAM

## 2021-06-23 PROCEDURE — 25010000002 FENTANYL CITRATE (PF) 50 MCG/ML SOLUTION: Performed by: STUDENT IN AN ORGANIZED HEALTH CARE EDUCATION/TRAINING PROGRAM

## 2021-06-23 PROCEDURE — 88304 TISSUE EXAM BY PATHOLOGIST: CPT | Performed by: SURGERY

## 2021-06-23 PROCEDURE — 47563 LAPARO CHOLECYSTECTOMY/GRAPH: CPT | Performed by: SURGERY

## 2021-06-23 PROCEDURE — 25010000002 MIDAZOLAM PER 1 MG: Performed by: ANESTHESIOLOGY

## 2021-06-23 PROCEDURE — C1889 IMPLANT/INSERT DEVICE, NOC: HCPCS | Performed by: SURGERY

## 2021-06-23 PROCEDURE — 82962 GLUCOSE BLOOD TEST: CPT

## 2021-06-23 PROCEDURE — 47563 LAPARO CHOLECYSTECTOMY/GRAPH: CPT | Performed by: PHYSICIAN ASSISTANT

## 2021-06-23 DEVICE — CLIP LIGAT VASC HORIZON TI MD/LG GRN 6CT: Type: IMPLANTABLE DEVICE | Site: ABDOMEN | Status: FUNCTIONAL

## 2021-06-23 RX ORDER — PROPOFOL 10 MG/ML
VIAL (ML) INTRAVENOUS AS NEEDED
Status: DISCONTINUED | OUTPATIENT
Start: 2021-06-23 | End: 2021-06-23 | Stop reason: SURG

## 2021-06-23 RX ORDER — ROCURONIUM BROMIDE 10 MG/ML
INJECTION, SOLUTION INTRAVENOUS AS NEEDED
Status: DISCONTINUED | OUTPATIENT
Start: 2021-06-23 | End: 2021-06-23 | Stop reason: SURG

## 2021-06-23 RX ORDER — DIPHENHYDRAMINE HYDROCHLORIDE 50 MG/ML
12.5 INJECTION INTRAMUSCULAR; INTRAVENOUS
Status: DISCONTINUED | OUTPATIENT
Start: 2021-06-23 | End: 2021-06-23 | Stop reason: HOSPADM

## 2021-06-23 RX ORDER — HYDROMORPHONE HCL 110MG/55ML
PATIENT CONTROLLED ANALGESIA SYRINGE INTRAVENOUS AS NEEDED
Status: DISCONTINUED | OUTPATIENT
Start: 2021-06-23 | End: 2021-06-23 | Stop reason: SURG

## 2021-06-23 RX ORDER — NEOSTIGMINE METHYLSULFATE 0.5 MG/ML
INJECTION, SOLUTION INTRAVENOUS AS NEEDED
Status: DISCONTINUED | OUTPATIENT
Start: 2021-06-23 | End: 2021-06-23 | Stop reason: SURG

## 2021-06-23 RX ORDER — IBUPROFEN 600 MG/1
600 TABLET ORAL ONCE AS NEEDED
Status: DISCONTINUED | OUTPATIENT
Start: 2021-06-23 | End: 2021-06-23 | Stop reason: HOSPADM

## 2021-06-23 RX ORDER — BUPIVACAINE HYDROCHLORIDE AND EPINEPHRINE 5; 5 MG/ML; UG/ML
INJECTION, SOLUTION PERINEURAL AS NEEDED
Status: DISCONTINUED | OUTPATIENT
Start: 2021-06-23 | End: 2021-06-23 | Stop reason: HOSPADM

## 2021-06-23 RX ORDER — HYDROMORPHONE HYDROCHLORIDE 1 MG/ML
0.5 INJECTION, SOLUTION INTRAMUSCULAR; INTRAVENOUS; SUBCUTANEOUS
Status: DISCONTINUED | OUTPATIENT
Start: 2021-06-23 | End: 2021-06-23 | Stop reason: HOSPADM

## 2021-06-23 RX ORDER — LIDOCAINE HYDROCHLORIDE 20 MG/ML
INJECTION, SOLUTION INFILTRATION; PERINEURAL AS NEEDED
Status: DISCONTINUED | OUTPATIENT
Start: 2021-06-23 | End: 2021-06-23 | Stop reason: SURG

## 2021-06-23 RX ORDER — FENTANYL CITRATE 50 UG/ML
50 INJECTION, SOLUTION INTRAMUSCULAR; INTRAVENOUS
Status: DISCONTINUED | OUTPATIENT
Start: 2021-06-23 | End: 2021-06-23 | Stop reason: HOSPADM

## 2021-06-23 RX ORDER — GLYCOPYRROLATE 0.2 MG/ML
INJECTION INTRAMUSCULAR; INTRAVENOUS AS NEEDED
Status: DISCONTINUED | OUTPATIENT
Start: 2021-06-23 | End: 2021-06-23 | Stop reason: SURG

## 2021-06-23 RX ORDER — SODIUM CHLORIDE 0.9 % (FLUSH) 0.9 %
3 SYRINGE (ML) INJECTION EVERY 12 HOURS SCHEDULED
Status: DISCONTINUED | OUTPATIENT
Start: 2021-06-23 | End: 2021-06-23 | Stop reason: HOSPADM

## 2021-06-23 RX ORDER — OXYCODONE AND ACETAMINOPHEN 10; 325 MG/1; MG/1
1 TABLET ORAL EVERY 4 HOURS PRN
Status: DISCONTINUED | OUTPATIENT
Start: 2021-06-23 | End: 2021-06-23 | Stop reason: HOSPADM

## 2021-06-23 RX ORDER — PROMETHAZINE HYDROCHLORIDE 25 MG/1
25 SUPPOSITORY RECTAL ONCE AS NEEDED
Status: DISCONTINUED | OUTPATIENT
Start: 2021-06-23 | End: 2021-06-23 | Stop reason: HOSPADM

## 2021-06-23 RX ORDER — ONDANSETRON 2 MG/ML
4 INJECTION INTRAMUSCULAR; INTRAVENOUS ONCE AS NEEDED
Status: DISCONTINUED | OUTPATIENT
Start: 2021-06-23 | End: 2021-06-23 | Stop reason: HOSPADM

## 2021-06-23 RX ORDER — FAMOTIDINE 10 MG/ML
20 INJECTION, SOLUTION INTRAVENOUS ONCE
Status: COMPLETED | OUTPATIENT
Start: 2021-06-23 | End: 2021-06-23

## 2021-06-23 RX ORDER — PROMETHAZINE HYDROCHLORIDE 25 MG/1
25 TABLET ORAL ONCE AS NEEDED
Status: DISCONTINUED | OUTPATIENT
Start: 2021-06-23 | End: 2021-06-23 | Stop reason: HOSPADM

## 2021-06-23 RX ORDER — LABETALOL HYDROCHLORIDE 5 MG/ML
5 INJECTION, SOLUTION INTRAVENOUS
Status: DISCONTINUED | OUTPATIENT
Start: 2021-06-23 | End: 2021-06-23 | Stop reason: HOSPADM

## 2021-06-23 RX ORDER — LIDOCAINE HYDROCHLORIDE 10 MG/ML
0.5 INJECTION, SOLUTION EPIDURAL; INFILTRATION; INTRACAUDAL; PERINEURAL ONCE AS NEEDED
Status: DISCONTINUED | OUTPATIENT
Start: 2021-06-23 | End: 2021-06-23 | Stop reason: HOSPADM

## 2021-06-23 RX ORDER — MIDAZOLAM HYDROCHLORIDE 1 MG/ML
1 INJECTION INTRAMUSCULAR; INTRAVENOUS
Status: DISCONTINUED | OUTPATIENT
Start: 2021-06-23 | End: 2021-06-23 | Stop reason: HOSPADM

## 2021-06-23 RX ORDER — SODIUM CHLORIDE, SODIUM LACTATE, POTASSIUM CHLORIDE, CALCIUM CHLORIDE 600; 310; 30; 20 MG/100ML; MG/100ML; MG/100ML; MG/100ML
9 INJECTION, SOLUTION INTRAVENOUS CONTINUOUS
Status: DISCONTINUED | OUTPATIENT
Start: 2021-06-23 | End: 2021-06-23 | Stop reason: HOSPADM

## 2021-06-23 RX ORDER — FLUMAZENIL 0.1 MG/ML
0.2 INJECTION INTRAVENOUS AS NEEDED
Status: DISCONTINUED | OUTPATIENT
Start: 2021-06-23 | End: 2021-06-23 | Stop reason: HOSPADM

## 2021-06-23 RX ORDER — HYDRALAZINE HYDROCHLORIDE 20 MG/ML
5 INJECTION INTRAMUSCULAR; INTRAVENOUS
Status: DISCONTINUED | OUTPATIENT
Start: 2021-06-23 | End: 2021-06-23 | Stop reason: HOSPADM

## 2021-06-23 RX ORDER — DEXAMETHASONE SODIUM PHOSPHATE 10 MG/ML
INJECTION INTRAMUSCULAR; INTRAVENOUS AS NEEDED
Status: DISCONTINUED | OUTPATIENT
Start: 2021-06-23 | End: 2021-06-23 | Stop reason: SURG

## 2021-06-23 RX ORDER — HYDROCODONE BITARTRATE AND ACETAMINOPHEN 5; 325 MG/1; MG/1
TABLET ORAL
Qty: 20 TABLET | Refills: 0 | Status: SHIPPED | OUTPATIENT
Start: 2021-06-23 | End: 2021-08-16

## 2021-06-23 RX ORDER — FENTANYL CITRATE 50 UG/ML
INJECTION, SOLUTION INTRAMUSCULAR; INTRAVENOUS AS NEEDED
Status: DISCONTINUED | OUTPATIENT
Start: 2021-06-23 | End: 2021-06-23 | Stop reason: SURG

## 2021-06-23 RX ORDER — HYDROCODONE BITARTRATE AND ACETAMINOPHEN 7.5; 325 MG/1; MG/1
1 TABLET ORAL ONCE AS NEEDED
Status: COMPLETED | OUTPATIENT
Start: 2021-06-23 | End: 2021-06-23

## 2021-06-23 RX ORDER — EPHEDRINE SULFATE 50 MG/ML
5 INJECTION, SOLUTION INTRAVENOUS ONCE AS NEEDED
Status: DISCONTINUED | OUTPATIENT
Start: 2021-06-23 | End: 2021-06-23 | Stop reason: HOSPADM

## 2021-06-23 RX ORDER — ACETAMINOPHEN 500 MG
1000 TABLET ORAL EVERY 6 HOURS PRN
COMMUNITY

## 2021-06-23 RX ORDER — SODIUM CHLORIDE 0.9 % (FLUSH) 0.9 %
3-10 SYRINGE (ML) INJECTION AS NEEDED
Status: DISCONTINUED | OUTPATIENT
Start: 2021-06-23 | End: 2021-06-23 | Stop reason: HOSPADM

## 2021-06-23 RX ORDER — DIPHENHYDRAMINE HCL 25 MG
25 CAPSULE ORAL
Status: DISCONTINUED | OUTPATIENT
Start: 2021-06-23 | End: 2021-06-23 | Stop reason: HOSPADM

## 2021-06-23 RX ORDER — ONDANSETRON 2 MG/ML
INJECTION INTRAMUSCULAR; INTRAVENOUS AS NEEDED
Status: DISCONTINUED | OUTPATIENT
Start: 2021-06-23 | End: 2021-06-23 | Stop reason: SURG

## 2021-06-23 RX ORDER — MAGNESIUM HYDROXIDE 1200 MG/15ML
LIQUID ORAL AS NEEDED
Status: DISCONTINUED | OUTPATIENT
Start: 2021-06-23 | End: 2021-06-23 | Stop reason: HOSPADM

## 2021-06-23 RX ORDER — NALOXONE HCL 0.4 MG/ML
0.2 VIAL (ML) INJECTION AS NEEDED
Status: DISCONTINUED | OUTPATIENT
Start: 2021-06-23 | End: 2021-06-23 | Stop reason: HOSPADM

## 2021-06-23 RX ORDER — CLINDAMYCIN PHOSPHATE 900 MG/50ML
900 INJECTION INTRAVENOUS ONCE
Status: COMPLETED | OUTPATIENT
Start: 2021-06-23 | End: 2021-06-23

## 2021-06-23 RX ADMIN — FENTANYL CITRATE 50 MCG: 50 INJECTION INTRAMUSCULAR; INTRAVENOUS at 10:08

## 2021-06-23 RX ADMIN — LIDOCAINE HYDROCHLORIDE 60 MG: 20 INJECTION, SOLUTION INFILTRATION; PERINEURAL at 09:45

## 2021-06-23 RX ADMIN — FENTANYL CITRATE 50 MCG: 50 INJECTION, SOLUTION INTRAMUSCULAR; INTRAVENOUS at 11:51

## 2021-06-23 RX ADMIN — FENTANYL CITRATE 25 MCG: 50 INJECTION INTRAMUSCULAR; INTRAVENOUS at 09:45

## 2021-06-23 RX ADMIN — FENTANYL CITRATE 50 MCG: 50 INJECTION, SOLUTION INTRAMUSCULAR; INTRAVENOUS at 11:41

## 2021-06-23 RX ADMIN — ROCURONIUM BROMIDE 10 MG: 50 INJECTION INTRAVENOUS at 10:08

## 2021-06-23 RX ADMIN — FENTANYL CITRATE 50 MCG: 50 INJECTION, SOLUTION INTRAMUSCULAR; INTRAVENOUS at 11:31

## 2021-06-23 RX ADMIN — HYDROMORPHONE HYDROCHLORIDE 0.5 MG: 2 INJECTION, SOLUTION INTRAMUSCULAR; INTRAVENOUS; SUBCUTANEOUS at 10:49

## 2021-06-23 RX ADMIN — FAMOTIDINE 20 MG: 10 INJECTION INTRAVENOUS at 08:46

## 2021-06-23 RX ADMIN — HYDROCODONE BITARTRATE AND ACETAMINOPHEN 1 TABLET: 7.5; 325 TABLET ORAL at 12:00

## 2021-06-23 RX ADMIN — HYDROMORPHONE HYDROCHLORIDE 0.5 MG: 1 INJECTION, SOLUTION INTRAMUSCULAR; INTRAVENOUS; SUBCUTANEOUS at 11:53

## 2021-06-23 RX ADMIN — ROCURONIUM BROMIDE 10 MG: 50 INJECTION INTRAVENOUS at 09:57

## 2021-06-23 RX ADMIN — PROPOFOL 100 MG: 10 INJECTION, EMULSION INTRAVENOUS at 09:50

## 2021-06-23 RX ADMIN — MIDAZOLAM 1 MG: 1 INJECTION INTRAMUSCULAR; INTRAVENOUS at 08:46

## 2021-06-23 RX ADMIN — CLINDAMYCIN PHOSPHATE 900 MG: 900 INJECTION, SOLUTION INTRAVENOUS at 09:33

## 2021-06-23 RX ADMIN — ONDANSETRON 4 MG: 2 INJECTION INTRAMUSCULAR; INTRAVENOUS at 10:53

## 2021-06-23 RX ADMIN — PROPOFOL 200 MG: 10 INJECTION, EMULSION INTRAVENOUS at 09:45

## 2021-06-23 RX ADMIN — GLYCOPYRROLATE 0.8 MG: 0.2 INJECTION INTRAMUSCULAR; INTRAVENOUS at 10:42

## 2021-06-23 RX ADMIN — ROCURONIUM BROMIDE 40 MG: 50 INJECTION INTRAVENOUS at 09:46

## 2021-06-23 RX ADMIN — FENTANYL CITRATE 25 MCG: 50 INJECTION INTRAMUSCULAR; INTRAVENOUS at 10:02

## 2021-06-23 RX ADMIN — SODIUM CHLORIDE, POTASSIUM CHLORIDE, SODIUM LACTATE AND CALCIUM CHLORIDE 9 ML/HR: 600; 310; 30; 20 INJECTION, SOLUTION INTRAVENOUS at 07:51

## 2021-06-23 RX ADMIN — DEXAMETHASONE SODIUM PHOSPHATE 4 MG: 10 INJECTION INTRAMUSCULAR; INTRAVENOUS at 09:54

## 2021-06-23 RX ADMIN — HYDROMORPHONE HYDROCHLORIDE 0.5 MG: 1 INJECTION, SOLUTION INTRAMUSCULAR; INTRAVENOUS; SUBCUTANEOUS at 11:43

## 2021-06-23 RX ADMIN — NEOSTIGMINE METHYLSULFATE 5 MG: 0.5 INJECTION INTRAVENOUS at 10:42

## 2021-06-23 NOTE — OP NOTE
Surgeon: Chris Carrion Jr., M.D.    Assistant: Carmela Christensen PA-C and Beto Russell CSA    An assistant was necessary and provided valuable retraction and exposure, as well as camera holding and incision closure.    Pre-Operative Diagnosis:     Biliary dyskinesia [K82.8]    Post-Operative Diagnosis:    Chronic cholecystitis    Procedure Performed:     Laparoscopic cholecystectomy with intraoperative cholangiogram    Indications:     The patient is a pleasant 51-year-old male with frequent epigastric and right upper quadrant abdominal pain that occurred after eating.  It was frequently associated with bloating and nausea.  He right upper quadrant ultrasound was normal.  A HIDA scan showed a decreased gallbladder ejection fraction of 14%.  He was diagnosed with biliary dyskinesia and presents for laparoscopic cholecystectomy with intraoperative cholangiogram.    Procedure:     The patient was identified and taken to the operating room where he was placed in the supine position on the operating table.  Monitors were placed and he underwent general endotracheal anesthesia and was appropriately monitored throughout the case by the anesthesia personnel.  The abdomen was prepped and draped in the standard surgical fashion.  Each incision was infiltrated with 0.5% Marcaine with epinephrine prior to making the incision.  A small incision was made in the left upper quadrant at Campos's point with a scalpel carried through the skin into the subcutaneous tissue.  A 5 mm Optiview port was then placed with laparoscopic guidance.  The abdomen was insufflated with low pressures encountered initially.  The laparoscope was inserted and numerous adhesions were identified related to his previous surgeries.  An area in the supraumbilical epigastrium was identified for a camera port.  A 5 mm port was placed in this area by making a skin incision carried through the skin into the subcutaneous tissue and inserting the port with  direct visualization using laparoscope.  The laparoscope was then placed in this port.  The original 5 mm Optiview port was changed out to a 10 mm port.  Two 5 mm ports were placed in the right upper quadrant in the same fashion.  The liver edge was elevated and the gallbladder was visualized.  The gallbladder was distended with a thickened wall.  The gallbladder was grasped and elevated over the liver.  Adhesions were taken down with blunt dissection and Bovie electrocautery.  The gallbladder was then grasped at the infundibulum and traction was applied to open the triangle of Calot.  The triangle of Calot was carefully and meticulously dissected.  The cystic duct was identified and surrounded.  A clip was placed at the infundibulum cystic duct junction.  A cholangiocatheter was introduced into the abdomen through an Angiocath.  A small incision was created in the cystic duct with scissors and the cholangiocatheter was inserted into the cystic duct.  It was secured with a clip.  A cholangiogram was then performed that confirmed our impression of the anatomy, showed a normal-sized common bile duct with no filling defects, and rapid drainage of contrast material into the duodenum.  The cholangiocatheter was removed and the cystic duct was divided after placing 2 clips proximally and dividing with the scissors.  The cystic artery was also identified, surrounded, and divided after placing 2 clips proximally and dividing distally using Bovie electrocautery.  The gallbladder was removed from the gallbladder fossa using Bovie electrocautery.  It was then passed out of the abdomen through the epigastric port site in an Endo Catch bag.  The gallbladder fossa was carefully inspected and noted to be hemostatic with no evidence of a bile leak.  The abdomen was deflated and all ports were removed.  The fascia of the subxiphoid port was closed with an 0 vicryl suture in a figure-of-eight fashion.  All skin incisions were closed  with a 4-0 Monocryl in a subcuticular fashion followed by benzoin and Steri-Strips.  The sponge, needle, and instrument counts were correct at the end of the case.  The patient tolerated the procedure well and was transferred to the recovery room in stable condition.    Estimated Blood Loss:      minimal    Specimens:     Order Name Source Comment Collection Info Order Time   TISSUE PATHOLOGY EXAM Gallbladder  Collected By: Chris Carrion Jr., MD 6/23/2021 10:47 AM     Release to patient   Immediate            Chris Carrion Jr., M.D.

## 2021-06-23 NOTE — ANESTHESIA PREPROCEDURE EVALUATION
Anesthesia Evaluation     Patient summary reviewed and Nursing notes reviewed                Airway   Mallampati: II  TM distance: >3 FB  Neck ROM: full  Possible difficult intubation  Dental      Pulmonary    (+) a smoker Former, sleep apnea on CPAP,   Cardiovascular     ECG reviewed  Rhythm: regular  Rate: normal    (+) hypertension, hyperlipidemia,       Neuro/Psych  (+) numbness,     GI/Hepatic/Renal/Endo    (+) obesity,  GERD,  liver disease fatty liver disease, diabetes mellitus type 2,     Musculoskeletal (-) negative ROS    Abdominal    Substance History - negative use     OB/GYN negative ob/gyn ROS         Other                        Anesthesia Plan    ASA 3     general   (I have reviewed the patient's history with the patient and the chart, including all pertinent laboratory results and imaging. I have explained the risks of anesthesia including but not limited to dental damage, corneal abrasion, nerve injury, MI, stroke, and death. Questions asked and answered. Anesthetic plan discussed with patient and team as indicated. Patient expressed understanding of the above.      CMAC available)  intravenous induction     Anesthetic plan, all risks, benefits, and alternatives have been provided, discussed and informed consent has been obtained with: patient.

## 2021-06-23 NOTE — ANESTHESIA POSTPROCEDURE EVALUATION
Patient: Qasim Ch    Procedure Summary     Date: 06/23/21 Room / Location: [facility] OR 03 / [facility] MAIN OR    Anesthesia Start: 0937 Anesthesia Stop: 1111    Procedure: CHOLECYSTECTOMY LAPAROSCOPIC INTRAOPERATIVE CHOLANGIOGRAM (N/A Abdomen) Diagnosis:       Biliary dyskinesia      (Biliary dyskinesia [K82.8])    Surgeons: Chris Carrion Jr., MD Provider: Shaggy Davila MD    Anesthesia Type: general ASA Status: 3          Anesthesia Type: general    Vitals  Vitals Value Taken Time   /78 06/23/21 1155   Temp 36.9 °C (98.4 °F) 06/23/21 1110   Pulse 92 06/23/21 1203   Resp 16 06/23/21 1155   SpO2 94 % 06/23/21 1203   Vitals shown include unvalidated device data.        Post Anesthesia Care and Evaluation    Patient location during evaluation: PACU  Patient participation: complete - patient participated  Level of consciousness: awake and alert  Pain management: adequate  Airway patency: patent  Anesthetic complications: No anesthetic complications    Cardiovascular status: acceptable  Respiratory status: acceptable  Hydration status: acceptable    Comments: --------------------            06/23/21               1155     --------------------   BP:       117/78     Pulse:      77       Resp:       16       Temp:                SpO2:      96%      --------------------

## 2021-06-23 NOTE — H&P
Subjective      Qasim Ch is a 51 y.o. male who presents for biliary dyskinesia.     History of Present Illness      The patient has a several month history of frequent vague abdominal pain that he attributed to indigestion after eating.  Symptoms are characterized by right upper quadrant abdominal pain but also bloating, nausea, and alternating diarrhea and constipation.  He had 2 significant episodes that prompted a presentation to the emergency room.  He states that if he eats steak he will always have the pain.  There are other things that he can eat that will cause the pain.  He has restricted his diet and has improved.  CT scan of the abdomen and pelvis was normal.  Right upper quadrant ultrasound was normal.  HIDA scan showed a decreased gallbladder ejection fraction of 14%.  The ingestion of the Ensure Plus caused some mild indigestion but not his typical symptoms.     Review of Systems   Constitutional: Negative for fatigue and fever.   Respiratory: Negative for chest tightness and shortness of breath.    Cardiovascular: Negative for chest pain and palpitations.   Gastrointestinal: Positive for abdominal pain, constipation, diarrhea and nausea. Negative for blood in stool and vomiting.      Medical History        Past Medical History:   Diagnosis Date   • Colon polyps       FOLLOWED BY DR. SERA LE   • DDD (degenerative disc disease), lumbar     • Diabetes (CMS/HCC)       TYPE 2, NIDDM   • Diverticulitis 09/2016     SIGMOID   • Diverticulitis of colon 4/18/2014   • Fatty liver     • GERD (gastroesophageal reflux disease)     • Hyperlipidemia     • Hypertension     • Hypertriglyceridemia     • Hypogonadism 2/7/2013   • Impotence of organic origin 2/22/2012   • Irritable bowel syndrome 1/14/2013   • Lumbar compression fracture (CMS/HCC)       L4   • Lumbar radiculopathy     • Obesity     • WOLF (obstructive sleep apnea)       cpap   • Plantar fasciitis 6/9/2016   • Polycythemia 2/22/2012   •  Spinal stenosis of lumbar region without neurogenic claudication 2018   • Spondylolisthesis           Surgical History         Past Surgical History:   Procedure Laterality Date   • ANAL FISSURECTOMY N/A 04/15/2009     DR.RAYMOND MICHAELS AT [facility]   • COLON SURGERY N/A 2016     SIGMOID COLECTOMY, D/T DIVERTICULITIS, DR. TYLER DUNBAR AT [facility]   • COLONOSCOPY N/A 04/15/2009     MIXED ADENOMATOUS POLYP IN RECTUM, DR. HERLINDA MICHAELS AT [facility]   • COLONOSCOPY N/A 2021     7 MM HYPERPLASTIC POLYP IN RECTUM, MULTIPLE SMALL AND LARGE DIVERTICULA IN ENTIRE COLON, RESCOPE IN 5 YRS, DR. SERA LE AT [facility]   • COLONOSCOPY W/ POLYPECTOMY N/A 2013     2 TUBULOVILLOUS ADENOMA POLYPS IN CECUM, 8 MM TUBULOVILLOUS ADENOMA POLYP IN ASCENDING, 14 MM TUBULOVILLOUS ADENOMA POLYP IN DISTAL ASCENDING, 5 MM TUBULAR ADENOMA POLYP IN ASCENDING, 5 MM BENIGN POLYP IN RECTUM, RESCOPE IN 2 YRS, DR. SERA LE AT [facility]   • ENDOSCOPY AND COLONOSCOPY N/A    • HEMORRHOIDECTOMY N/A 04/15/2009     DR.RAYMOND MICHAELS AT [facility]   • HERNIA REPAIR Left 2017     lap left inguinal hernia repair, ventral hernia repair, DR. TYLER DUNBAR AT [facility]   • LUMBAR EPIDURAL INJECTION N/A       lumbar   • LUMBAR SPINE POSTERIOR N/A 2018     TLIF L3-L5, DR. MOHAMMAD MAJD AT [facility]    • TONSILLECTOMY Bilateral     • VENTRAL HERNIA REPAIR N/A 2018     LAPAROSCOPIC WITH PREVIOUS MESH REMOVAL, DR. TYLER DUNBAR AT [facility]               Family History   Problem Relation Age of Onset   • Colon cancer Father        Social History   Social History            Socioeconomic History   • Marital status:        Spouse name: Not on file   • Number of children: Not on file   • Years of education: Not on file   • Highest education level: Not on file   Tobacco Use   • Smoking status: Former Smoker       Packs/day: 1.00       Years: 10.00       Pack years: 10.00       Types: Cigarettes       Quit date:        Years since quittin.3   •  Smokeless tobacco: Current User       Types: Chew   Vaping Use   • Vaping Use: Never used   Substance and Sexual Activity   • Alcohol use: Not Currently   • Drug use: No   • Sexual activity: Defer                  Objective      Physical Exam  Constitutional:       Appearance: Normal appearance. He is well-developed. He is not toxic-appearing.   Eyes:      General: No scleral icterus.  Pulmonary:      Effort: Pulmonary effort is normal. No respiratory distress.   Abdominal:      Palpations: Abdomen is soft.      Tenderness: There is no abdominal tenderness.   Skin:     General: Skin is warm and dry.   Neurological:      Mental Status: He is alert and oriented to person, place, and time.   Psychiatric:         Behavior: Behavior normal.         Thought Content: Thought content normal.         Judgment: Judgment normal.                  Assessment/Plan            The encounter diagnosis was Biliary dyskinesia.     The patient has recurrent symptoms of right upper quadrant abdominal pain, nausea, bloating, and alternating constipation and diarrhea that seems to be postprandial in nature.  He has a HIDA scan that shows a decreased gallbladder ejection fraction of 14%.  All of his symptoms can be consistent with biliary dyskinesia.  This was discussed with him.  He will be scheduled for laparoscopic cholecystectomy with intraoperative cholangiogram.  The patient understands the indications, alternatives, risks, and benefits of the procedure and wishes to proceed.

## 2021-06-23 NOTE — ANESTHESIA PROCEDURE NOTES
Airway  Urgency: elective    Date/Time: 6/23/2021 9:52 AM  Airway not difficult    General Information and Staff    Patient location during procedure: OR  Anesthesiologist: Shaggy Davila MD  CRNA: James Wang CRNA    Indications and Patient Condition  Indications for airway management: airway protection    Preoxygenated: yes  MILS not maintained throughout  Mask difficulty assessment: 2 - vent by mask + OA or adjuvant +/- NMBA    Final Airway Details  Final airway type: endotracheal airway      Successful airway: ETT  Cuffed: yes   Successful intubation technique: direct laryngoscopy  Facilitating devices/methods: Bougie and anterior pressure/BURP  Endotracheal tube insertion site: oral  Blade: Judith  Blade size: 4  ETT size (mm): 7.5  Cormack-Lehane Classification: grade III - view of epiglottis only  Placement verified by: chest auscultation and capnometry   Cuff volume (mL): 8  Measured from: lips  ETT/EBT  to lips (cm): 23  Number of attempts at approach: 1  Assessment: lips, teeth, and gum same as pre-op and atraumatic intubation

## 2021-06-23 NOTE — DISCHARGE INSTRUCTIONS
Dr. Chris Carrion  [address] [facility] [address]  [place], [place] [id number] [phone]    Discharge Instructions for Gall Bladder Surgery    1. Go home, rest and take it easy today; however, you should get up and move about several times today to reduce the risk of developing a clot in your legs.      2. You may experience some dizziness or memory loss from the anesthesia.  This may last for the next 24 hours.  Someone should plan on staying with you for the first 24 hours for your safety.    3. Do not make any important legal decisions or sign any legal papers for the next 24 hours.      4. Eat and drink lightly today.  Start off with liquids, jello, soup, crackers or other bland foods at first. You may advance your diet tomorrow as tolerated as long as you do not experience any nausea or vomiting.     5. You may remove your outer dressings in 2 days.  The white tapes called steri-strips should stay in place.  They will fall off on their own in 1-2 weeks.  Do not worry if they come off sooner.      6. You may notice some bleeding/drainage on your outer dressings. A little bloody drainage is normal. If the bleeding/drainage is such that the bandage cannot absorb it, remove the dressing, apply clean gauze and apply firm pressure for a full 15 minutes.  If the bleeding continues, please call me.    7. You may shower tomorrow.  No tub baths until your incisions are completely healed.         8. You have received a prescription for a narcotic pain medicine, as you will have some pain following surgery.   You will not be totally pain free, but your pain medicine should make the pain tolerable.  Please take your pain medicine as prescribed and always take your pills with food to prevent nausea. If you are having severe pain that cannot be controlled by the pain medicine, please contact me.      9. It is not unusual to experience pain/discomfort in your shoulders or under your ribs after surgery.  It is from the gas used  during the laparoscopic procedure and usually lasts 1-3 days.  The prescription pain medicine is used to treat the surgical pain and does not typically alleviate this “gassy” pain.     10. No driving for 24 hours and for as long as you are taking your prescription pain medicine.      11. You will need to call the office at [phone] to schedule a follow-up appointment in  2 weeks.     12. Remember to contact me for any of the following:    • Fever > 101 degrees  • Severe pain that cannot be controlled by taking your pain pills  • Severe nausea or vomiting   • Significant bleeding of your incisions  • Drainage that has a bad smell or is yellow or green in appearance  • Any other questions or concerns

## 2021-06-24 LAB
CYTO UR: NORMAL
LAB AP CASE REPORT: NORMAL
PATH REPORT.FINAL DX SPEC: NORMAL
PATH REPORT.GROSS SPEC: NORMAL

## 2021-07-30 DIAGNOSIS — E78.1 PRIMARY HYPERTRIGLYCERIDEMIA: ICD-10-CM

## 2021-07-30 RX ORDER — ROSUVASTATIN CALCIUM 10 MG/1
10 TABLET, COATED ORAL NIGHTLY
Qty: 30 TABLET | Refills: 11 | Status: SHIPPED | OUTPATIENT
Start: 2021-07-30 | End: 2022-08-05

## 2021-08-16 ENCOUNTER — OFFICE VISIT (OUTPATIENT)
Dept: INTERNAL MEDICINE | Age: 51
End: 2021-08-16

## 2021-08-16 VITALS
WEIGHT: 290 LBS | HEIGHT: 73 IN | BODY MASS INDEX: 38.43 KG/M2 | OXYGEN SATURATION: 98 % | TEMPERATURE: 97.8 F | DIASTOLIC BLOOD PRESSURE: 72 MMHG | SYSTOLIC BLOOD PRESSURE: 120 MMHG | HEART RATE: 90 BPM

## 2021-08-16 DIAGNOSIS — M62.831 MUSCLE SPASM OF LEFT CALF: ICD-10-CM

## 2021-08-16 DIAGNOSIS — E11.9 TYPE 2 DIABETES MELLITUS WITHOUT COMPLICATION, WITHOUT LONG-TERM CURRENT USE OF INSULIN (HCC): Chronic | ICD-10-CM

## 2021-08-16 DIAGNOSIS — I10 ESSENTIAL HYPERTENSION: Chronic | ICD-10-CM

## 2021-08-16 DIAGNOSIS — E78.1 HYPERTRIGLYCERIDEMIA: Primary | Chronic | ICD-10-CM

## 2021-08-16 DIAGNOSIS — M48.061 SPINAL STENOSIS OF LUMBAR REGION WITHOUT NEUROGENIC CLAUDICATION: ICD-10-CM

## 2021-08-16 PROCEDURE — 99214 OFFICE O/P EST MOD 30 MIN: CPT | Performed by: INTERNAL MEDICINE

## 2021-08-16 RX ORDER — TIZANIDINE 4 MG/1
TABLET ORAL
Qty: 30 TABLET | Refills: 0 | Status: SHIPPED | OUTPATIENT
Start: 2021-08-16 | End: 2022-02-02 | Stop reason: SDUPTHER

## 2021-08-16 NOTE — PROGRESS NOTES
I N T E R N A L  M E D I C I N E  J U N O H  K I M,  M D      ENCOUNTER DATE:  2021    Qasim Quirozerd / 51 y.o. / male      CHIEF COMPLAINT / REASON FOR OFFICE VISIT     Diabetes, Hyperlipidemia, Hypertension, and Gastroesophageal reflux disease without esophagitis      ASSESSMENT & PLAN     Problem List Items Addressed This Visit        High    Diabetes mellitus, type II (CMS/HCC) (Chronic)    Overview     Continue metformin 1000 mg BID.          Current Assessment & Plan     Lab Results   Component Value Date    HGBA1C 5.30 2021    HGBA1C 5.7 (H) 2020    HGBA1C 6.5 (H) 2020        Continue metformin 1000 mg BID.          Relevant Medications    metFORMIN (Glucophage) 1000 MG tablet    Other Relevant Orders    Hemoglobin A1c    Hypertension (Chronic)    Overview     Continue lisinopril 10 mg qd.          Relevant Medications    lisinopril (PRINIVIL,ZESTRIL) 10 MG tablet       Medium    Hypertriglyceridemia - Primary (Chronic)    Current Assessment & Plan     Continue rosuvastatin 10 mg and Vascepa.   Check labs today.         Relevant Medications    icosapent ethyl (Vascepa) 1 g capsule capsule    rosuvastatin (CRESTOR) 10 MG tablet    Other Relevant Orders    Lipid Panel With / Chol / HDL Ratio    Spinal stenosis of lumbar region without neurogenic claudication (Chronic)    Current Assessment & Plan     Follow-up with spine specialist.   Refilled tizanidine for now.          Relevant Medications    tiZANidine (Zanaflex) 4 MG tablet      Other Visit Diagnoses     Muscle spasm of left calf        Relevant Medications    tiZANidine (Zanaflex) 4 MG tablet        Orders Placed This Encounter   Procedures   • Lipid Panel With / Chol / HDL Ratio   • Hemoglobin A1c     New Medications Ordered This Visit   Medications   • tiZANidine (Zanaflex) 4 MG tablet     Si po q hs for muscle cramps of legs     Dispense:  30 tablet     Refill:  0       SUMMARY/DISCUSSION  • Strongly recommended  "COVID-19 vaccine.       Next Appointment with me: Visit date not found    Return in about 4 months (around 12/16/2021) for Reassess chronic medical problems.      VITAL SIGNS     Visit Vitals  /72 (BP Location: Left arm)   Pulse 90   Temp 97.8 °F (36.6 °C)   Ht 185.4 cm (73\")   Wt 132 kg (290 lb)   SpO2 98%   BMI 38.26 kg/m²       BP Readings from Last 3 Encounters:   08/16/21 120/72   06/23/21 132/81   06/17/21 138/84     Wt Readings from Last 3 Encounters:   08/16/21 132 kg (290 lb)   06/23/21 124 kg (273 lb)   06/17/21 126 kg (278 lb)     Body mass index is 38.26 kg/m².      MEDICATIONS AT THE TIME OF OFFICE VISIT     Current Outpatient Medications on File Prior to Visit   Medication Sig   • acetaminophen (TYLENOL) 500 MG tablet Take 1,000 mg by mouth Every 6 (Six) Hours As Needed for Mild Pain .   • icosapent ethyl (Vascepa) 1 g capsule capsule Take 2 g by mouth 2 (Two) Times a Day With Meals. (Patient taking differently: Take 2 g by mouth 2 (Two) Times a Day With Meals. HOLD FOR SURGERY)   • lisinopril (PRINIVIL,ZESTRIL) 10 MG tablet Take 1 tablet by mouth Daily.   • metFORMIN (Glucophage) 1000 MG tablet Take 1 tablet by mouth 2 (Two) Times a Day With Meals.   • multivitamin (MULTIPLE VITAMINS PO) Take 1 tablet by mouth Every Night. HOLD FOR SURGERY   • pantoprazole (PROTONIX) 40 MG EC tablet TAKE 1 TABLET BY MOUTH DAILY   • rosuvastatin (CRESTOR) 10 MG tablet TAKE 1 TABLET BY MOUTH EVERY NIGHT   • tiZANidine (Zanaflex) 4 MG tablet 1-2 po q hs for cramps (Patient taking differently: Take 8 mg by mouth Every Night. 1-2 po q hs for cramps)       HISTORY OF PRESENT ILLNESS     Diabetes is stable on metformin. Weight gain of 17 lbs noted.   Previously stopped gemfibrozil and started rosuvastatin 10 and Vascepa for hyperlipidemia.   Denies significant unusual myalgia symptoms but has stable chronic musculoskeletal symptoms.   Requests refill for tizanidine for left leg pain from spasms relating to his spine. " Sees spine specialist.   Hypertension remains stable on lisinopril 10 mg qd.       Patient Care Team:  Narinder Ortiz MD as PCP - General (Internal Medicine)    REVIEW OF SYSTEMS     Review of Systems   Constitutional neg except per HPI   Resp neg  CV neg   MuSk left lower leg spasms at night     PHYSICAL EXAMINATION     Physical Exam  Obese   Cardiovascular: Normal rate, regular rhythm.         REVIEWED DATA     Labs:     Lab Results   Component Value Date     06/17/2021    K 4.1 06/17/2021    CALCIUM 9.6 06/17/2021    AST 23 05/04/2021    ALT 26 05/04/2021    BUN 14 06/17/2021    CREATININE 0.76 06/17/2021    CREATININE 0.63 (L) 05/04/2021    CREATININE 0.67 (L) 05/02/2021    EGFRIFNONA 109 06/17/2021    EGFRIFAFRI >150 05/04/2021       Lab Results   Component Value Date    HGBA1C 5.30 03/19/2021    HGBA1C 5.7 (H) 09/02/2020    HGBA1C 6.5 (H) 01/03/2020       Lab Results   Component Value Date    LDL 89 03/19/2021    LDL  09/02/2020      Comment:      Unable to calculate    LDL 87 09/02/2020    HDL 26 (L) 03/19/2021    TRIG 328 (H) 03/19/2021       Lab Results   Component Value Date    TSH 0.429 12/27/2019    TSH 0.45 03/08/2017    TSH 0.36 (L) 06/14/2016       Lab Results   Component Value Date    WBC 6.79 06/17/2021    HGB 14.4 06/17/2021     (L) 06/17/2021     Lab Results   Component Value Date    TESTOSTERONE 375 03/08/2017    TESTOSTERONE 450 06/14/2016     Lab Results   Component Value Date    PSA 0.492 12/27/2019    PSA 0.3 03/08/2017    HCT 41.5 06/17/2021    HCT 39.9 05/02/2021    HCT 44.6 02/28/2021        Imaging:           Medical Tests:           Summary of old records / correspondence / consultant report:           Request outside records:             *Examiner was wearing KN95 mask and eye protection during the entire duration of the visit. Patient was masked the entire time.   Minimum social distance of 6 ft maintained entire visit except if physical contact was necessary as documented.      **Dragon Disclaimer:   Much of this encounter note is an electronic transcription/translation of spoken language to printed text. The electronic translation of spoken language may permit erroneous, or at times, nonsensical words or phrases to be inadvertently transcribed. Although I have reviewed the note for such errors, some may still exist.       Template created by Jennifer Ortiz MD

## 2021-08-16 NOTE — ASSESSMENT & PLAN NOTE
Lab Results   Component Value Date    HGBA1C 5.30 03/19/2021    HGBA1C 5.7 (H) 09/02/2020    HGBA1C 6.5 (H) 01/03/2020        Continue metformin 1000 mg BID.

## 2021-08-31 ENCOUNTER — LAB (OUTPATIENT)
Dept: LAB | Facility: HOSPITAL | Age: 51
End: 2021-08-31

## 2021-08-31 DIAGNOSIS — E11.9 TYPE 2 DIABETES MELLITUS WITHOUT COMPLICATION, WITHOUT LONG-TERM CURRENT USE OF INSULIN (HCC): Primary | ICD-10-CM

## 2021-08-31 LAB — HBA1C MFR BLD: 5.7 % (ref 3.5–5.6)

## 2021-08-31 PROCEDURE — 83036 HEMOGLOBIN GLYCOSYLATED A1C: CPT

## 2021-08-31 PROCEDURE — 80053 COMPREHEN METABOLIC PANEL: CPT | Performed by: INTERNAL MEDICINE

## 2021-08-31 PROCEDURE — 80061 LIPID PANEL: CPT | Performed by: INTERNAL MEDICINE

## 2021-09-01 DIAGNOSIS — E78.1 HYPERTRIGLYCERIDEMIA: Primary | ICD-10-CM

## 2021-09-09 NOTE — PROGRESS NOTES
Subjective   History of Present Illness: Qasim Ch is a 51 y.o. male is being seen for consultation today at the request of Narinder Ortiz MD for lumbar spinal stenosis.  He reports that he has not had an MRI in the past year and a half.  He had an L3-L5 TLIF by Dr. Jauregui in December 2018.  He reports that initially his lower extremity pain had completely gone away however it returned while he was recovering from that surgery.  He reports that since that time he has had intermittent left-sided leg pain.  He also reports numbness in all of his toes on both feet.  He reports that he also drags his left foot at times when he walks and does not lifted up as high.  He has difficulty standing from a squatting position using his left leg.      History of Present Illness    The following portions of the patient's history were reviewed and updated as appropriate: allergies, past family history, past medical history, past social history, past surgical history and problem list.    Past Medical History:   Diagnosis Date   • Bilateral leg cramps     AT HS   • Colon polyps     FOLLOWED BY DR. SERA LE   • DDD (degenerative disc disease), lumbar    • DDD (degenerative disc disease), lumbar 2018    L3,L4,L5   • Diabetes (CMS/HCC)     TYPE 2, NIDDM   • Disorder of gallbladder    • Diverticulitis 09/2016    SIGMOID   • Diverticulitis of colon 4/18/2014   • Fatty liver    • GERD (gastroesophageal reflux disease)    • Hyperlipidemia    • Hypertension    • Hypertriglyceridemia    • Hypogonadism 2/7/2013   • Impotence of organic origin 2/22/2012   • Irritable bowel syndrome 1/14/2013   • Lumbar compression fracture (CMS/HCC)     L4   • Lumbar radiculopathy    • Numbness and tingling of foot     LEFT FOOT, S/P BACK SURGERY   • Obesity    • WOLF (obstructive sleep apnea)     cpap   • Plantar fasciitis 6/9/2016   • Polycythemia 2/22/2012   • Spinal stenosis of lumbar region without neurogenic claudication 9/5/2018   •  Spondylolisthesis         Past Surgical History:   Procedure Laterality Date   • ANAL FISSURECTOMY N/A 04/15/2009    DR.RAYMOND MCIHAELS AT [facility]   • CHOLECYSTECTOMY WITH INTRAOPERATIVE CHOLANGIOGRAM N/A 6/23/2021    Procedure: CHOLECYSTECTOMY LAPAROSCOPIC INTRAOPERATIVE CHOLANGIOGRAM;  Surgeon: Chris Carrion Jr., MD;  Location: [facility] OR;  Service: General;  Laterality: N/A;   • COLON SURGERY N/A 09/16/2016    SIGMOID COLECTOMY, D/T DIVERTICULITIS, DR. TYLER DUNBAR AT [facility]   • COLONOSCOPY N/A 04/15/2009    MIXED ADENOMATOUS POLYP IN RECTUM, DR. HERLINDA MICHAELS AT [facility]   • COLONOSCOPY N/A 1/28/2021    7 MM HYPERPLASTIC POLYP IN RECTUM, MULTIPLE SMALL AND LARGE DIVERTICULA IN ENTIRE COLON, RESCOPE IN 5 YRS, DR. SERA LE AT [facility]   • COLONOSCOPY W/ POLYPECTOMY N/A 12/11/2013    2 TUBULOVILLOUS ADENOMA POLYPS IN CECUM, 8 MM TUBULOVILLOUS ADENOMA POLYP IN ASCENDING, 14 MM TUBULOVILLOUS ADENOMA POLYP IN DISTAL ASCENDING, 5 MM TUBULAR ADENOMA POLYP IN ASCENDING, 5 MM BENIGN POLYP IN RECTUM, RESCOPE IN 2 YRS, DR. SERA LE AT [facility]   • ENDOSCOPY AND COLONOSCOPY N/A 2014   • HEMORRHOIDECTOMY N/A 04/15/2009    DR.RAYMOND MICHAELS AT [facility]   • HERNIA REPAIR Left 09/14/2017    lap left inguinal hernia repair, ventral hernia repair, DR. TYLER DUNBAR AT [facility]   • LUMBAR EPIDURAL INJECTION N/A     lumbar   • LUMBAR SPINE POSTERIOR N/A 12/03/2018    TLIF L3-L5, DR. MOHAMMAD MAJD AT [facility]    • TONSILLECTOMY Bilateral    • VENTRAL HERNIA REPAIR N/A 03/01/2018    LAPAROSCOPIC WITH PREVIOUS MESH REMOVAL, DR. TYLER DUNBAR AT [facility]          Current Outpatient Medications:   •  icosapent ethyl (Vascepa) 1 g capsule capsule, Take 2 g by mouth 2 (Two) Times a Day With Meals. (Patient taking differently: Take 2 g by mouth 2 (Two) Times a Day With Meals. HOLD FOR SURGERY), Disp: 120 capsule, Rfl: 3  •  lisinopril (PRINIVIL,ZESTRIL) 10 MG tablet, Take 1 tablet by mouth Daily., Disp: 90 tablet, Rfl: 3  •  metFORMIN (Glucophage) 1000 MG  "tablet, Take 1 tablet by mouth 2 (Two) Times a Day With Meals., Disp: 180 tablet, Rfl: 3  •  multivitamin (MULTIPLE VITAMINS PO), Take 1 tablet by mouth Every Night. HOLD FOR SURGERY, Disp: , Rfl:   •  pantoprazole (PROTONIX) 40 MG EC tablet, TAKE 1 TABLET BY MOUTH DAILY, Disp: 90 tablet, Rfl: 1  •  rosuvastatin (CRESTOR) 10 MG tablet, TAKE 1 TABLET BY MOUTH EVERY NIGHT, Disp: 30 tablet, Rfl: 11  •  tiZANidine (Zanaflex) 4 MG tablet, 1 po q hs for muscle cramps of legs, Disp: 30 tablet, Rfl: 0  •  acetaminophen (TYLENOL) 500 MG tablet, Take 1,000 mg by mouth Every 6 (Six) Hours As Needed for Mild Pain ., Disp: , Rfl:      Allergies   Allergen Reactions   • Penicillin G Anaphylaxis        Social History     Socioeconomic History   • Marital status:      Spouse name: Not on file   • Number of children: Not on file   • Years of education: Not on file   • Highest education level: Not on file   Tobacco Use   • Smoking status: Former Smoker     Packs/day: 1.00     Years: 10.00     Pack years: 10.00     Types: Cigarettes     Quit date:      Years since quittin.7   • Smokeless tobacco: Current User     Types: Chew   Vaping Use   • Vaping Use: Never used   Substance and Sexual Activity   • Alcohol use: Not Currently   • Drug use: No   • Sexual activity: Defer        Family History   Problem Relation Age of Onset   • Colon cancer Father    • Malig Hyperthermia Neg Hx         Review of Systems   Cardiovascular: Negative for leg swelling.   Gastrointestinal: Negative for constipation and diarrhea.   Genitourinary: Negative for difficulty urinating, frequency and urgency.   Musculoskeletal: Positive for back pain and gait problem.        Leg cramps    Neurological: Positive for weakness (L leg ) and numbness.       Objective     Vitals:    21 1449   BP: 160/86   Pulse: 92   Temp: 98.6 °F (37 °C)   Weight: 131 kg (289 lb 14.4 oz)   Height: 185.4 cm (73\")     Body mass index is 38.25 kg/m².      Physical " Exam  Neurologic Exam  Awake, alert, oriented x3  Pupils equal round reactive to light  Extraocular muscles intact  Face symmetric  Speech is fluent and clear  No pronator drift  Motor exam  Bilateral deltoids 5/5, bilateral biceps 5/5, bilateral triceps 5/5, bilateral wrist extension 5/5 bilateral hand  5/5  Bilateral hip flexion 5/5, bilateral knee extension 5/5, left dorsiflexion 4/5, left EHL 4/5, right dorsiflexion 5/5, right EHL 5/5  No clonus, bilateral plantar flexion 5/5  No Roberto's reflex  Steady normal gait  Able to detect  light touch in all 4 extremities        Assessment/Plan     Medical Decision Makin-year-old male s/p L3-L5 TLIF by Dr. Bradshaw in 2018  -He reports that since that time he has had some residual left lower extremity pain and weakness.  -On physical exam he appears to have some dorsiflexion weakness as well as EHL weakness.  He describes pain that radiates down his left posterior thigh and into his calf.  He also indicates some numbness of the left first 2 toes.  He appears to have an L4 and L5 radiculopathy.  He has not had a recent MRI in the past 1-1/2 years  -I have recommended a repeat MRI to evaluate for any changes.  -He reports that approximately a year ago he was seen by pain management and received selective L4-5 and L3-4 nerve root injections with significant improvement that lasted approximately 6 months.  He is requesting a referral to pain management  -He would like to hold off on physical therapy at this time.  -I explained to him that reoperative surgery would be very challenging, but we will review the repeat MRI and hope for improvement with injections from pain management    Diagnoses and all orders for this visit:    1. Left lumbar radiculopathy (Primary)  -     MRI Lumbar Spine Without Contrast; Future  -     Ambulatory Referral to Pain Management      Return in about 4 weeks (around 10/11/2021).

## 2021-09-13 ENCOUNTER — OFFICE VISIT (OUTPATIENT)
Dept: NEUROSURGERY | Facility: CLINIC | Age: 51
End: 2021-09-13

## 2021-09-13 VITALS
TEMPERATURE: 98.6 F | HEART RATE: 92 BPM | SYSTOLIC BLOOD PRESSURE: 160 MMHG | BODY MASS INDEX: 38.42 KG/M2 | WEIGHT: 289.9 LBS | DIASTOLIC BLOOD PRESSURE: 86 MMHG | HEIGHT: 73 IN

## 2021-09-13 DIAGNOSIS — M54.16 LEFT LUMBAR RADICULOPATHY: Primary | ICD-10-CM

## 2021-09-13 PROCEDURE — 99203 OFFICE O/P NEW LOW 30 MIN: CPT | Performed by: NEUROLOGICAL SURGERY

## 2021-10-06 ENCOUNTER — HOSPITAL ENCOUNTER (OUTPATIENT)
Dept: MRI IMAGING | Facility: HOSPITAL | Age: 51
Discharge: HOME | End: 2021-10-06
Admitting: NEUROLOGICAL SURGERY

## 2021-10-06 DIAGNOSIS — M54.16 LEFT LUMBAR RADICULOPATHY: ICD-10-CM

## 2021-10-06 PROCEDURE — 72148 MRI LUMBAR SPINE W/O DYE: CPT

## 2021-10-12 RX ORDER — ICOSAPENT ETHYL 1000 MG/1
CAPSULE ORAL
Qty: 120 CAPSULE | Refills: 3 | Status: SHIPPED | OUTPATIENT
Start: 2021-10-12 | End: 2022-03-08

## 2021-10-29 ENCOUNTER — OFFICE VISIT (OUTPATIENT)
Dept: NEUROSURGERY | Facility: CLINIC | Age: 51
End: 2021-10-29

## 2021-10-29 VITALS
SYSTOLIC BLOOD PRESSURE: 130 MMHG | HEIGHT: 73 IN | DIASTOLIC BLOOD PRESSURE: 86 MMHG | WEIGHT: 290.2 LBS | HEART RATE: 96 BPM | BODY MASS INDEX: 38.46 KG/M2 | TEMPERATURE: 98.4 F

## 2021-10-29 DIAGNOSIS — M54.16 LEFT LUMBAR RADICULOPATHY: Primary | ICD-10-CM

## 2021-10-29 PROCEDURE — 99214 OFFICE O/P EST MOD 30 MIN: CPT | Performed by: NEUROLOGICAL SURGERY

## 2022-01-04 ENCOUNTER — LAB (OUTPATIENT)
Dept: LAB | Facility: HOSPITAL | Age: 52
End: 2022-01-04

## 2022-01-04 DIAGNOSIS — E78.1 HYPERTRIGLYCERIDEMIA: ICD-10-CM

## 2022-01-04 LAB
CHOLEST SERPL-MCNC: 133 MG/DL (ref 0–200)
HDLC SERPL QL: 5.32
HDLC SERPL-MCNC: 25 MG/DL (ref 40–60)
LDLC SERPL CALC-MCNC: 48 MG/DL (ref 0–100)
TRIGL SERPL-MCNC: 399 MG/DL (ref 0–150)
VLDLC SERPL-MCNC: 60 MG/DL (ref 5–40)

## 2022-01-04 PROCEDURE — 36415 COLL VENOUS BLD VENIPUNCTURE: CPT

## 2022-01-04 PROCEDURE — 80061 LIPID PANEL: CPT

## 2022-02-02 ENCOUNTER — OFFICE VISIT (OUTPATIENT)
Dept: INTERNAL MEDICINE | Age: 52
End: 2022-02-02

## 2022-02-02 VITALS
DIASTOLIC BLOOD PRESSURE: 74 MMHG | WEIGHT: 295 LBS | TEMPERATURE: 98.4 F | HEART RATE: 94 BPM | SYSTOLIC BLOOD PRESSURE: 120 MMHG | BODY MASS INDEX: 39.1 KG/M2 | HEIGHT: 73 IN | OXYGEN SATURATION: 95 %

## 2022-02-02 DIAGNOSIS — E78.1 HYPERTRIGLYCERIDEMIA: Chronic | ICD-10-CM

## 2022-02-02 DIAGNOSIS — I10 PRIMARY HYPERTENSION: Chronic | ICD-10-CM

## 2022-02-02 DIAGNOSIS — E11.9 TYPE 2 DIABETES MELLITUS WITHOUT COMPLICATION, WITHOUT LONG-TERM CURRENT USE OF INSULIN: Primary | ICD-10-CM

## 2022-02-02 DIAGNOSIS — M48.061 SPINAL STENOSIS OF LUMBAR REGION WITHOUT NEUROGENIC CLAUDICATION: ICD-10-CM

## 2022-02-02 DIAGNOSIS — M62.831 MUSCLE SPASM OF LEFT CALF: ICD-10-CM

## 2022-02-02 PROCEDURE — 99214 OFFICE O/P EST MOD 30 MIN: CPT | Performed by: INTERNAL MEDICINE

## 2022-02-02 RX ORDER — HYDROCODONE BITARTRATE AND ACETAMINOPHEN 5; 325 MG/1; MG/1
TABLET ORAL
COMMUNITY
End: 2022-02-02

## 2022-02-02 RX ORDER — ORAL SEMAGLUTIDE 3 MG/1
3 TABLET ORAL
Qty: 30 TABLET | Refills: 0
Start: 2022-02-02 | End: 2022-04-19 | Stop reason: DRUGHIGH

## 2022-02-02 RX ORDER — TIZANIDINE 4 MG/1
TABLET ORAL
Qty: 30 TABLET | Refills: 0 | Status: SHIPPED | OUTPATIENT
Start: 2022-02-02 | End: 2022-04-13

## 2022-02-02 RX ORDER — LIDOCAINE 50 MG/G
PATCH TOPICAL
COMMUNITY

## 2022-02-02 NOTE — PROGRESS NOTES
I N T E R N A L  M E D I C I N E  J U N O H  K I M,  M D      ENCOUNTER DATE:  02/02/2022    Qasim SAUCEDO Ch / 51 y.o. / male      CHIEF COMPLAINT / REASON FOR OFFICE VISIT     Diabetes, Hypertension, and Hyperlipidemia      ASSESSMENT & PLAN     Problem List Items Addressed This Visit        High    Diabetes mellitus, type II (HCC) - Primary (Chronic)    Overview     Continue metformin 1000 mg BID.          Current Assessment & Plan     Has not been taking metformin for last 2 weeks. Wt gain is noted.   Discussed GLP-1 agonist / SGLT-2 inhibitor.   He would like to try Rybelsus 3 mg qAM. Sample provided.   Resume metformin. Prescription sent to pharmacy.   He will let me know when he is ready for the higher dose.   Follow-up 3 months.     Lab Results   Component Value Date    HGBA1C 5.7 (H) 08/31/2021    HGBA1C 5.30 03/19/2021    HGBA1C 5.7 (H) 09/02/2020      Wt Readings from Last 3 Encounters:   02/02/22 134 kg (295 lb)   10/29/21 132 kg (290 lb 3.2 oz)   09/13/21 131 kg (289 lb 14.4 oz)             Relevant Medications    metFORMIN (Glucophage) 1000 MG tablet    Semaglutide (Rybelsus) 3 MG tablet    Hypertension (Chronic)    Overview     Continue lisinopril 10 mg qd.          Relevant Medications    lisinopril (PRINIVIL,ZESTRIL) 10 MG tablet       Medium    Hypertriglyceridemia (Chronic)    Current Assessment & Plan     Continue rosuvastatin 10 mg and Vascepa.     Lab Results   Component Value Date    LDL 48 01/04/2022    LDL 14 08/31/2021    LDL 89 03/19/2021    HDL 25 (L) 01/04/2022    TRIG 399 (H) 01/04/2022    CHOLHDLRATIO 5.32 01/04/2022            Relevant Medications    rosuvastatin (CRESTOR) 10 MG tablet    icosapent ethyl (VASCEPA) 1 g capsule capsule    Spinal stenosis of lumbar region without neurogenic claudication (Chronic)    Relevant Medications    tiZANidine (Zanaflex) 4 MG tablet      Other Visit Diagnoses     Muscle spasm of left calf        Relevant Medications    tiZANidine (Zanaflex)  "4 MG tablet        No orders of the defined types were placed in this encounter.    New Medications Ordered This Visit   Medications   • tiZANidine (Zanaflex) 4 MG tablet     Si po q hs for muscle cramps of legs     Dispense:  30 tablet     Refill:  0   • metFORMIN (Glucophage) 1000 MG tablet     Sig: Take 1 tablet by mouth 2 (Two) Times a Day With Meals.     Dispense:  180 tablet     Refill:  3   • Semaglutide (Rybelsus) 3 MG tablet     Sig: Take 1 tablet by mouth Every Morning Before Breakfast.     Dispense:  30 tablet     Refill:  0       SUMMARY/DISCUSSION  •       Next Appointment with me: Visit date not found    Return in about 3 months (around 2022) for Diabetes.        VITAL SIGNS     Visit Vitals  /74 (BP Location: Left arm)   Pulse 94   Temp 98.4 °F (36.9 °C)   Ht 185.4 cm (73\")   Wt 134 kg (295 lb)   SpO2 95%   BMI 38.92 kg/m²       BP Readings from Last 3 Encounters:   22 120/74   10/29/21 130/86   21 160/86     Wt Readings from Last 3 Encounters:   22 134 kg (295 lb)   10/29/21 132 kg (290 lb 3.2 oz)   21 131 kg (289 lb 14.4 oz)     Body mass index is 38.92 kg/m².      MEDICATIONS AT THE TIME OF OFFICE VISIT     Current Outpatient Medications on File Prior to Visit   Medication Sig   • acetaminophen (TYLENOL) 500 MG tablet Take 1,000 mg by mouth Every 6 (Six) Hours As Needed for Mild Pain .   • icosapent ethyl (VASCEPA) 1 g capsule capsule TAKE 2 CAPSULES BY MOUTH TWICE DAILY WITH MEALS   • lisinopril (PRINIVIL,ZESTRIL) 10 MG tablet Take 1 tablet by mouth Daily.   • multivitamin (MULTIPLE VITAMINS PO) Take 1 tablet by mouth Every Night. HOLD FOR SURGERY   • rosuvastatin (CRESTOR) 10 MG tablet TAKE 1 TABLET BY MOUTH EVERY NIGHT   • [DISCONTINUED] metFORMIN (Glucophage) 1000 MG tablet Take 1 tablet by mouth 2 (Two) Times a Day With Meals.   • [DISCONTINUED] tiZANidine (Zanaflex) 4 MG tablet 1 po q hs for muscle cramps of legs   • lidocaine (LIDODERM) 5 % lidocaine 5 % " topical patch   APPLY 1 PATCH TOPICALLY TO THE SKIN EVERY DAY AS NEEDED. MAY WEAR UP TO 12 HOURS   • pantoprazole (PROTONIX) 40 MG EC tablet TAKE 1 TABLET BY MOUTH DAILY   • [DISCONTINUED] HYDROcodone-acetaminophen (NORCO) 5-325 MG per tablet hydrocodone 5 mg-acetaminophen 325 mg tablet   TAKE 1 TABLET BY MOUTH EVERY 6 HOURS AS NEEDED FOR PAIN     No current facility-administered medications on file prior to visit.         HISTORY OF PRESENT ILLNESS     Had difficulty filling metformin at his pharmacy therefore he has been out of it for 2 weeks.  Last A1c was 5.7 in August.  Ongoing weight gain is noted and currently he is at 295 pounds.  Hypertension remains stable on lisinopril.  He is taking rosuvastatin 10 mg and Vascepa for hypercholesterolemia with hypertriglyceridemia.    Lab Results   Component Value Date    HGBA1C 5.7 (H) 08/31/2021    HGBA1C 5.30 03/19/2021    HGBA1C 5.7 (H) 09/02/2020    CREATININE 0.81 08/31/2021    LDL 48 01/04/2022    MICROALBUR 3.4 09/02/2020      Wt Readings from Last 3 Encounters:   02/02/22 134 kg (295 lb)   10/29/21 132 kg (290 lb 3.2 oz)   09/13/21 131 kg (289 lb 14.4 oz)         REVIEW OF SYSTEMS     Constitutional neg except per HPI   Resp neg  CV neg   GI neg       PHYSICAL EXAMINATION     Physical Exam  Obese   Alert with normal thought and judgment.       REVIEWED DATA     Labs:       Lab Results   Component Value Date     08/31/2021    K 4.9 08/31/2021    CALCIUM 9.7 08/31/2021    AST 42 (H) 08/31/2021    ALT 45 (H) 08/31/2021    BUN 13 08/31/2021    CREATININE 0.81 08/31/2021    CREATININE 0.76 06/17/2021    CREATININE 0.63 (L) 05/04/2021    EGFRIFNONA 101 08/31/2021    EGFRIFAFRI >150 05/04/2021       Lab Results   Component Value Date    HGBA1C 5.7 (H) 08/31/2021    HGBA1C 5.30 03/19/2021    HGBA1C 5.7 (H) 09/02/2020       Lab Results   Component Value Date    LDL 48 01/04/2022    LDL 14 08/31/2021    HDL 25 (L) 01/04/2022    TRIG 399 (H) 01/04/2022       Lab  Results   Component Value Date    TSH 0.429 12/27/2019    TSH 0.45 03/08/2017    TSH 0.36 (L) 06/14/2016       Lab Results   Component Value Date    WBC 6.79 06/17/2021    HGB 14.4 06/17/2021     (L) 06/17/2021       Lab Results   Component Value Date    MICROALBUR 3.4 09/02/2020           Imaging:           Medical Tests:           Summary of old records / correspondence / consultant report:           Request outside records:           *Examiner was wearing KN95 mask and eye protection during the entire duration of the visit. Patient was masked the entire time. Minimum social distance of 6 ft maintained entire visit except if physical contact was necessary as documented.       Template created by Jennifer Ortiz MD

## 2022-02-02 NOTE — ASSESSMENT & PLAN NOTE
Continue rosuvastatin 10 mg and Vascepa.     Lab Results   Component Value Date    LDL 48 01/04/2022    LDL 14 08/31/2021    LDL 89 03/19/2021    HDL 25 (L) 01/04/2022    TRIG 399 (H) 01/04/2022    CHOLHDLRATIO 5.32 01/04/2022

## 2022-02-02 NOTE — ASSESSMENT & PLAN NOTE
Has not been taking metformin for last 2 weeks. Wt gain is noted.   Discussed GLP-1 agonist / SGLT-2 inhibitor.   He would like to try Rybelsus 3 mg qAM. Sample provided.   Resume metformin. Prescription sent to pharmacy.   He will let me know when he is ready for the higher dose.   Follow-up 3 months.     Lab Results   Component Value Date    HGBA1C 5.7 (H) 08/31/2021    HGBA1C 5.30 03/19/2021    HGBA1C 5.7 (H) 09/02/2020      Wt Readings from Last 3 Encounters:   02/02/22 134 kg (295 lb)   10/29/21 132 kg (290 lb 3.2 oz)   09/13/21 131 kg (289 lb 14.4 oz)

## 2022-02-02 NOTE — PATIENT INSTRUCTIONS
** IMPORTANT MESSAGE FROM DR. SINGH **    In our office, your satisfaction is VERY important to us.     You may receive a survey from [facility] by mail or E-mail for you to provide feedback about your visit. This information is invaluable for me to know what we can do to improve our services.     I ask that you please take a few minutes to complete the survey and let us know how we are doing in serving your needs. (You may receive the survey more than once for multiple visits)    Thank You !    Dr. Singh    _________________________________________________________________________________________________________________________      ** ADDITIONAL INSTRUCTION / REMINDERS FROM DR. SINGH **    Let me know when you are ready to go up on the Rybelsus dose.

## 2022-03-08 RX ORDER — ICOSAPENT ETHYL 1000 MG/1
CAPSULE ORAL
Qty: 120 CAPSULE | Refills: 3 | Status: SHIPPED | OUTPATIENT
Start: 2022-03-08 | End: 2022-07-18

## 2022-04-13 DIAGNOSIS — M62.831 MUSCLE SPASM OF LEFT CALF: ICD-10-CM

## 2022-04-13 DIAGNOSIS — M48.061 SPINAL STENOSIS OF LUMBAR REGION WITHOUT NEUROGENIC CLAUDICATION: ICD-10-CM

## 2022-04-13 RX ORDER — TIZANIDINE 4 MG/1
TABLET ORAL
Qty: 30 TABLET | Refills: 2 | Status: SHIPPED | OUTPATIENT
Start: 2022-04-13 | End: 2022-06-02 | Stop reason: SDUPTHER

## 2022-04-19 DIAGNOSIS — E11.9 TYPE 2 DIABETES MELLITUS WITHOUT COMPLICATION, WITHOUT LONG-TERM CURRENT USE OF INSULIN: ICD-10-CM

## 2022-04-19 RX ORDER — ORAL SEMAGLUTIDE 7 MG/1
7 TABLET ORAL
Qty: 30 TABLET | Refills: 0 | Status: SHIPPED | OUTPATIENT
Start: 2022-04-19 | End: 2022-06-07

## 2022-04-28 RX ORDER — LISINOPRIL 10 MG/1
10 TABLET ORAL DAILY
Qty: 30 TABLET | Refills: 0 | OUTPATIENT
Start: 2022-04-28

## 2022-04-28 RX ORDER — LISINOPRIL 10 MG/1
10 TABLET ORAL DAILY
Qty: 30 TABLET | Refills: 0 | Status: SHIPPED | OUTPATIENT
Start: 2022-04-28 | End: 2022-06-07

## 2022-05-17 PROBLEM — M47.817 LUMBOSACRAL SPONDYLOSIS: Status: ACTIVE | Noted: 2020-05-29

## 2022-05-17 PROBLEM — Z98.1 HISTORY OF ARTHRODESIS: Status: ACTIVE | Noted: 2020-05-29

## 2022-05-17 PROBLEM — M54.17 LUMBOSACRAL RADICULOPATHY: Status: ACTIVE | Noted: 2020-03-11

## 2022-05-17 PROBLEM — M43.16 SPONDYLOLISTHESIS OF LUMBAR REGION: Status: ACTIVE | Noted: 2020-05-29

## 2022-05-17 PROBLEM — M99.59 INTERVERTEBRAL DISC STENOSIS OF NEURAL CANAL: Status: ACTIVE | Noted: 2018-09-05

## 2022-05-17 PROBLEM — T81.9XXA COMPLICATION OF SURGICAL PROCEDURE: Status: ACTIVE | Noted: 2020-03-11

## 2022-06-02 ENCOUNTER — OFFICE VISIT (OUTPATIENT)
Dept: INTERNAL MEDICINE | Age: 52
End: 2022-06-02

## 2022-06-02 VITALS
BODY MASS INDEX: 37.51 KG/M2 | HEART RATE: 83 BPM | TEMPERATURE: 97.8 F | SYSTOLIC BLOOD PRESSURE: 120 MMHG | OXYGEN SATURATION: 97 % | HEIGHT: 73 IN | DIASTOLIC BLOOD PRESSURE: 82 MMHG | WEIGHT: 283 LBS

## 2022-06-02 DIAGNOSIS — M48.061 SPINAL STENOSIS OF LUMBAR REGION WITHOUT NEUROGENIC CLAUDICATION: ICD-10-CM

## 2022-06-02 DIAGNOSIS — E11.9 TYPE 2 DIABETES MELLITUS WITHOUT COMPLICATION, WITHOUT LONG-TERM CURRENT USE OF INSULIN: Primary | Chronic | ICD-10-CM

## 2022-06-02 DIAGNOSIS — M62.831 MUSCLE SPASM OF LEFT CALF: ICD-10-CM

## 2022-06-02 DIAGNOSIS — D69.6 THROMBOCYTOPENIA: ICD-10-CM

## 2022-06-02 DIAGNOSIS — E78.1 HYPERTRIGLYCERIDEMIA: ICD-10-CM

## 2022-06-02 PROCEDURE — 90471 IMMUNIZATION ADMIN: CPT | Performed by: INTERNAL MEDICINE

## 2022-06-02 PROCEDURE — 99214 OFFICE O/P EST MOD 30 MIN: CPT | Performed by: INTERNAL MEDICINE

## 2022-06-02 PROCEDURE — 90677 PCV20 VACCINE IM: CPT | Performed by: INTERNAL MEDICINE

## 2022-06-02 RX ORDER — TIZANIDINE 2 MG/1
2 TABLET ORAL NIGHTLY PRN
Qty: 30 TABLET | Refills: 0 | Status: SHIPPED | OUTPATIENT
Start: 2022-06-02 | End: 2022-09-16 | Stop reason: SDUPTHER

## 2022-06-02 NOTE — PATIENT INSTRUCTIONS
** IMPORTANT MESSAGE FROM DR. SINGH **    In our office, your satisfaction is VERY important to us.     You may receive a survey from [employer] by mail or E-mail for you to provide feedback about your visit. This information is invaluable for me to know what we can do to improve our services.     I ask that you please take a few minutes to complete the survey and let us know how we are doing in serving your needs. (You may receive the survey more than once for multiple visits)    Thank You !    Dr. Singh    _________________________________________________________________________________________________________________________      ** ADDITIONAL INSTRUCTION / REMINDERS FROM DR. SINGH **    OBTAIN THESE VACCINES AT THE PHARMACY:  Shingrix (shingles), Hepatitis A Series, Tdap (Adacel), and Hepatitis B Series

## 2022-06-02 NOTE — ASSESSMENT & PLAN NOTE
Good weight loss on Rybelsus 7 mg. Would continue current dose for now along with metformin 1000 mg BID.

## 2022-06-02 NOTE — PROGRESS NOTES
I N T E R N A L  M E D I C I N E  J U N O H  K I M,  M D      ENCOUNTER DATE:  06/02/2022    Qasim D Ch / 52 y.o. / male      CHIEF COMPLAINT / REASON FOR OFFICE VISIT     Diabetes      ASSESSMENT & PLAN     Problem List Items Addressed This Visit        High    Diabetes mellitus, type II (HCC) - Primary (Chronic)    Overview     Continue metformin 1000 mg BID and Rybelsus 7 mg qd.            Current Assessment & Plan     Good weight loss on Rybelsus 7 mg. Would continue current dose for now along with metformin 1000 mg BID.            Relevant Medications    metFORMIN (Glucophage) 1000 MG tablet    Semaglutide (Rybelsus) 7 MG tablet    Other Relevant Orders    Comprehensive Metabolic Panel    Hemoglobin A1c    Microalbumin / Creatinine Urine Ratio - Urine, Clean Catch       Medium    Hypertriglyceridemia (Chronic)    Overview     Continue rosuvastatin and Vascepa.            Relevant Medications    rosuvastatin (CRESTOR) 10 MG tablet    icosapent ethyl (VASCEPA) 1 g capsule capsule    Other Relevant Orders    Lipid Panel With / Chol / HDL Ratio      Other Visit Diagnoses     Muscle spasm of left calf        Relevant Medications    tiZANidine (ZANAFLEX) 2 MG tablet    Spinal stenosis of lumbar region without neurogenic claudication        Relevant Medications    tiZANidine (ZANAFLEX) 2 MG tablet    Thrombocytopenia (HCC)        Relevant Orders    CBC & Differential        Orders Placed This Encounter   Procedures   • Pneumococcal Conjugate Vaccine 20-Valent All   • Comprehensive Metabolic Panel   • Hemoglobin A1c   • Lipid Panel With / Chol / HDL Ratio   • Microalbumin / Creatinine Urine Ratio - Urine, Clean Catch   • CBC & Differential     New Medications Ordered This Visit   Medications   • tiZANidine (ZANAFLEX) 2 MG tablet     Sig: Take 1 tablet by mouth At Night As Needed for Muscle Spasms.     Dispense:  30 tablet     Refill:  0       SUMMARY/DISCUSSION  •       Next Appointment with me: Visit date  "not found    Return in about 6 months (around 12/2/2022) for COMBINED annual physical & chronic medical.        VITAL SIGNS     Vitals:    06/02/22 0902   BP: 120/82   BP Location: Left arm   Pulse: 83   Temp: 97.8 °F (36.6 °C)   SpO2: 97%   Weight: 128 kg (283 lb)   Height: 185.4 cm (73\")       BP Readings from Last 3 Encounters:   06/02/22 120/82   02/02/22 120/74   10/29/21 130/86     Wt Readings from Last 3 Encounters:   06/02/22 128 kg (283 lb)   02/02/22 134 kg (295 lb)   10/29/21 132 kg (290 lb 3.2 oz)     Body mass index is 37.34 kg/m².    Blood pressure readings recorded on patient flowsheet:  No flowsheet data found.     MEDICATIONS AT THE TIME OF OFFICE VISIT     Current Outpatient Medications on File Prior to Visit   Medication Sig   • acetaminophen (TYLENOL) 500 MG tablet Take 1,000 mg by mouth Every 6 (Six) Hours As Needed for Mild Pain .   • icosapent ethyl (VASCEPA) 1 g capsule capsule TAKE 2 CAPSULES BY MOUTH TWICE DAILY WITH MEALS   • lidocaine (LIDODERM) 5 % lidocaine 5 % topical patch   APPLY 1 PATCH TOPICALLY TO THE SKIN EVERY DAY AS NEEDED. MAY WEAR UP TO 12 HOURS   • lisinopril (PRINIVIL,ZESTRIL) 10 MG tablet TAKE 1 TABLET BY MOUTH DAILY   • metFORMIN (Glucophage) 1000 MG tablet Take 1 tablet by mouth 2 (Two) Times a Day With Meals.   • multivitamin (THERAGRAN) tablet tablet Take 1 tablet by mouth Every Night. HOLD FOR SURGERY   • rosuvastatin (CRESTOR) 10 MG tablet TAKE 1 TABLET BY MOUTH EVERY NIGHT   • Semaglutide (Rybelsus) 7 MG tablet Take 7 mg by mouth Every Morning Before Breakfast.   • [DISCONTINUED] tiZANidine (ZANAFLEX) 4 MG tablet TAKE 1 TABLET BY MOUTH AT BEDTIME FOR MUSCLE CRAMPS OF LEGS   • pantoprazole (PROTONIX) 40 MG EC tablet TAKE 1 TABLET BY MOUTH DAILY     No current facility-administered medications on file prior to visit.         HISTORY OF PRESENT ILLNESS     Blood sugar readings recorded on patient's flowsheet:  No flowsheet data found.     Having good weight loss with " Rybelsus currently at 7 mg daily without significant side effects.  Previous A1c level was 5.7.  He is also on metformin 1000 mg twice daily.  He is on rosuvastatin 10 mg and Vascepa for hypertriglyceridemia.  Patient requests a refill on tizanidine which he takes for leg muscle spasm and nights.       Lab Results   Component Value Date    HGBA1C 5.7 (H) 08/31/2021    HGBA1C 5.30 03/19/2021    HGBA1C 5.7 (H) 09/02/2020    CREATININE 0.81 08/31/2021    LDL 48 01/04/2022    MALBCRERATIO 9.7 09/02/2020        128 kg (283 lb)    REVIEW OF SYSTEMS     Intended wt loss   GI negative   Muscle spasm of legs       PHYSICAL EXAMINATION     Physical Exam  General: No acute distress; weight loss noted   Psych: Normal thought and judgment   Cardiovascular Rate: normal. Rhythm: regular. Heart sounds: normal.       REVIEWED DATA     Labs:       Lab Results   Component Value Date     08/31/2021    K 4.9 08/31/2021    CALCIUM 9.7 08/31/2021    AST 42 (H) 08/31/2021    ALT 45 (H) 08/31/2021    BUN 13 08/31/2021    CREATININE 0.81 08/31/2021    CREATININE 0.76 06/17/2021    CREATININE 0.63 (L) 05/04/2021    EGFRIFNONA 101 08/31/2021    EGFRIFAFRI >150 05/04/2021       Lab Results   Component Value Date    HGBA1C 5.7 (H) 08/31/2021    HGBA1C 5.30 03/19/2021    HGBA1C 5.7 (H) 09/02/2020       Lab Results   Component Value Date    LDL 48 01/04/2022    LDL 14 08/31/2021    LDL 89 03/19/2021    HDL 25 (L) 01/04/2022    HDL 25 (L) 08/31/2021    TRIG 399 (H) 01/04/2022    TRIG 681 (H) 08/31/2021       Lab Results   Component Value Date    TSH 0.429 12/27/2019    TSH 0.45 03/08/2017    TSH 0.36 (L) 06/14/2016       Lab Results   Component Value Date    WBC 6.79 06/17/2021    HGB 14.4 06/17/2021     (L) 06/17/2021     Lab Results   Component Value Date     (L) 06/17/2021     05/02/2021     02/28/2021      Lab Results   Component Value Date    MALBCRERATIO 9.7 09/02/2020          Imaging:           Medical  Tests:           Summary of old records / correspondence / consultant report:           Request outside records:           *Examiner was wearing [id number] mask and eye protection during the entire duration of the visit. Patient was masked the entire time. Minimum social distance of 6 ft maintained entire visit except if physical contact was necessary as documented.       Template created by Jennifer Ortiz MD

## 2022-06-03 LAB
ALBUMIN SERPL-MCNC: 4.4 G/DL (ref 4–5)
ALBUMIN/CREAT UR: 5 MG/G CREAT (ref 0–29)
ALBUMIN/GLOB SERPL: 1.5 {RATIO} (ref 1.2–2.2)
ALP SERPL-CCNC: 73 IU/L (ref 44–121)
ALT SERPL-CCNC: 35 IU/L (ref 0–44)
AST SERPL-CCNC: 37 IU/L (ref 0–40)
BASOPHILS # BLD AUTO: 0.1 X10E3/UL (ref 0–0.2)
BASOPHILS NFR BLD AUTO: 1 %
BILIRUB SERPL-MCNC: 0.4 MG/DL (ref 0–1.2)
BUN SERPL-MCNC: 10 MG/DL (ref 6–24)
BUN/CREAT SERPL: 14 (ref 9–20)
CALCIUM SERPL-MCNC: 9.7 MG/DL (ref 8.7–10.2)
CHLORIDE SERPL-SCNC: 101 MMOL/L (ref 96–106)
CHOLEST SERPL-MCNC: 105 MG/DL (ref 100–199)
CHOLEST/HDLC SERPL: 4.6 RATIO (ref 0–5)
CO2 SERPL-SCNC: 22 MMOL/L (ref 20–29)
CREAT SERPL-MCNC: 0.74 MG/DL (ref 0.76–1.27)
CREAT UR-MCNC: 87.6 MG/DL
EGFRCR SERPLBLD CKD-EPI 2021: 110 ML/MIN/1.73
EOSINOPHIL # BLD AUTO: 0.2 X10E3/UL (ref 0–0.4)
EOSINOPHIL NFR BLD AUTO: 2 %
ERYTHROCYTE [DISTWIDTH] IN BLOOD BY AUTOMATED COUNT: 12.5 % (ref 11.6–15.4)
GLOBULIN SER CALC-MCNC: 3 G/DL (ref 1.5–4.5)
GLUCOSE SERPL-MCNC: 95 MG/DL (ref 65–99)
HBA1C MFR BLD: 5.8 % (ref 4.8–5.6)
HCT VFR BLD AUTO: 45.6 % (ref 37.5–51)
HDLC SERPL-MCNC: 23 MG/DL
HGB BLD-MCNC: 16 G/DL (ref 13–17.7)
IMM GRANULOCYTES # BLD AUTO: 0 X10E3/UL (ref 0–0.1)
IMM GRANULOCYTES NFR BLD AUTO: 0 %
LDLC SERPL CALC-MCNC: 39 MG/DL (ref 0–99)
LYMPHOCYTES # BLD AUTO: 2.3 X10E3/UL (ref 0.7–3.1)
LYMPHOCYTES NFR BLD AUTO: 27 %
MCH RBC QN AUTO: 32.1 PG (ref 26.6–33)
MCHC RBC AUTO-ENTMCNC: 35.1 G/DL (ref 31.5–35.7)
MCV RBC AUTO: 91 FL (ref 79–97)
MICROALBUMIN UR-MCNC: 4.1 UG/ML
MONOCYTES # BLD AUTO: 0.7 X10E3/UL (ref 0.1–0.9)
MONOCYTES NFR BLD AUTO: 9 %
NEUTROPHILS # BLD AUTO: 5.2 X10E3/UL (ref 1.4–7)
NEUTROPHILS NFR BLD AUTO: 61 %
PLATELET # BLD AUTO: 179 X10E3/UL (ref 150–450)
POTASSIUM SERPL-SCNC: 4.3 MMOL/L (ref 3.5–5.2)
PROT SERPL-MCNC: 7.4 G/DL (ref 6–8.5)
RBC # BLD AUTO: 4.99 X10E6/UL (ref 4.14–5.8)
SODIUM SERPL-SCNC: 138 MMOL/L (ref 134–144)
TRIGL SERPL-MCNC: 284 MG/DL (ref 0–149)
VLDLC SERPL CALC-MCNC: 43 MG/DL (ref 5–40)
WBC # BLD AUTO: 8.5 X10E3/UL (ref 3.4–10.8)

## 2022-06-06 DIAGNOSIS — E11.9 TYPE 2 DIABETES MELLITUS WITHOUT COMPLICATION, WITHOUT LONG-TERM CURRENT USE OF INSULIN: ICD-10-CM

## 2022-06-07 RX ORDER — LISINOPRIL 10 MG/1
10 TABLET ORAL DAILY
Qty: 90 TABLET | Refills: 3 | Status: SHIPPED | OUTPATIENT
Start: 2022-06-07 | End: 2022-07-06

## 2022-06-07 RX ORDER — LISINOPRIL 10 MG/1
10 TABLET ORAL DAILY
Qty: 30 TABLET | Refills: 0 | Status: SHIPPED | OUTPATIENT
Start: 2022-06-07 | End: 2022-06-07

## 2022-06-07 RX ORDER — ORAL SEMAGLUTIDE 7 MG/1
TABLET ORAL
Qty: 30 TABLET | Refills: 6 | Status: SHIPPED | OUTPATIENT
Start: 2022-06-07 | End: 2022-12-13 | Stop reason: DRUGHIGH

## 2022-07-06 RX ORDER — LISINOPRIL 10 MG/1
10 TABLET ORAL DAILY
Qty: 30 TABLET | Refills: 0 | Status: SHIPPED | OUTPATIENT
Start: 2022-07-06

## 2022-07-18 DIAGNOSIS — E78.1 HYPERTRIGLYCERIDEMIA: Primary | ICD-10-CM

## 2022-07-18 RX ORDER — ICOSAPENT ETHYL 1000 MG/1
CAPSULE ORAL
Qty: 120 CAPSULE | Refills: 11 | Status: SHIPPED | OUTPATIENT
Start: 2022-07-18

## 2022-08-05 DIAGNOSIS — M48.061 SPINAL STENOSIS OF LUMBAR REGION WITHOUT NEUROGENIC CLAUDICATION: ICD-10-CM

## 2022-08-05 DIAGNOSIS — E78.1 PRIMARY HYPERTRIGLYCERIDEMIA: ICD-10-CM

## 2022-08-05 DIAGNOSIS — M62.831 MUSCLE SPASM OF LEFT CALF: ICD-10-CM

## 2022-08-05 RX ORDER — TIZANIDINE 4 MG/1
TABLET ORAL
Qty: 30 TABLET | Refills: 0 | Status: SHIPPED | OUTPATIENT
Start: 2022-08-05 | End: 2022-09-16 | Stop reason: SDUPTHER

## 2022-08-05 RX ORDER — ROSUVASTATIN CALCIUM 10 MG/1
10 TABLET, COATED ORAL NIGHTLY
Qty: 30 TABLET | Refills: 0 | Status: SHIPPED | OUTPATIENT
Start: 2022-08-05 | End: 2022-08-05

## 2022-08-05 RX ORDER — ROSUVASTATIN CALCIUM 10 MG/1
10 TABLET, COATED ORAL NIGHTLY
Qty: 90 TABLET | Refills: 0 | Status: SHIPPED | OUTPATIENT
Start: 2022-08-05 | End: 2022-12-07

## 2022-09-08 DIAGNOSIS — M62.831 MUSCLE SPASM OF LEFT CALF: ICD-10-CM

## 2022-09-08 DIAGNOSIS — M48.061 SPINAL STENOSIS OF LUMBAR REGION WITHOUT NEUROGENIC CLAUDICATION: ICD-10-CM

## 2022-09-12 RX ORDER — TIZANIDINE 4 MG/1
TABLET ORAL
Qty: 30 TABLET | Refills: 0 | Status: CANCELLED | OUTPATIENT
Start: 2022-09-12

## 2022-09-15 DIAGNOSIS — M62.831 MUSCLE SPASM OF LEFT CALF: ICD-10-CM

## 2022-09-15 DIAGNOSIS — M48.061 SPINAL STENOSIS OF LUMBAR REGION WITHOUT NEUROGENIC CLAUDICATION: ICD-10-CM

## 2022-09-16 DIAGNOSIS — M48.061 SPINAL STENOSIS OF LUMBAR REGION WITHOUT NEUROGENIC CLAUDICATION: ICD-10-CM

## 2022-09-16 DIAGNOSIS — M62.831 MUSCLE SPASM OF LEFT CALF: Primary | ICD-10-CM

## 2022-09-16 DIAGNOSIS — M62.831 MUSCLE SPASM OF LEFT CALF: ICD-10-CM

## 2022-09-16 RX ORDER — TIZANIDINE HYDROCHLORIDE 4 MG/1
4 CAPSULE, GELATIN COATED ORAL NIGHTLY PRN
Qty: 21 CAPSULE | Refills: 0 | Status: SHIPPED | OUTPATIENT
Start: 2022-09-16 | End: 2022-11-08 | Stop reason: SDUPTHER

## 2022-09-16 RX ORDER — TIZANIDINE 4 MG/1
TABLET ORAL
Qty: 30 TABLET | Refills: 0 | Status: SHIPPED | OUTPATIENT
Start: 2022-09-16 | End: 2022-09-16

## 2022-09-16 RX ORDER — TIZANIDINE 4 MG/1
TABLET ORAL
Qty: 30 TABLET | Refills: 0 | Status: CANCELLED | OUTPATIENT
Start: 2022-09-16

## 2022-11-01 DIAGNOSIS — M48.061 SPINAL STENOSIS OF LUMBAR REGION WITHOUT NEUROGENIC CLAUDICATION: ICD-10-CM

## 2022-11-01 DIAGNOSIS — M62.831 MUSCLE SPASM OF LEFT CALF: ICD-10-CM

## 2022-11-08 RX ORDER — TIZANIDINE 4 MG/1
TABLET ORAL
Qty: 30 TABLET | Refills: 2 | Status: SHIPPED | OUTPATIENT
Start: 2022-11-08 | End: 2022-12-13 | Stop reason: SDUPTHER

## 2022-11-08 RX ORDER — TIZANIDINE HYDROCHLORIDE 4 MG/1
4 CAPSULE, GELATIN COATED ORAL NIGHTLY PRN
Qty: 30 CAPSULE | Refills: 0 | Status: SHIPPED | OUTPATIENT
Start: 2022-11-08 | End: 2023-02-14 | Stop reason: SDUPTHER

## 2022-12-07 DIAGNOSIS — E78.1 PRIMARY HYPERTRIGLYCERIDEMIA: ICD-10-CM

## 2022-12-07 RX ORDER — ROSUVASTATIN CALCIUM 10 MG/1
10 TABLET, COATED ORAL NIGHTLY
Qty: 90 TABLET | Refills: 0 | Status: SHIPPED | OUTPATIENT
Start: 2022-12-07 | End: 2023-02-28

## 2022-12-13 ENCOUNTER — OFFICE VISIT (OUTPATIENT)
Dept: INTERNAL MEDICINE | Age: 52
End: 2022-12-13

## 2022-12-13 VITALS
HEIGHT: 73 IN | WEIGHT: 276 LBS | HEART RATE: 76 BPM | SYSTOLIC BLOOD PRESSURE: 140 MMHG | TEMPERATURE: 97.7 F | DIASTOLIC BLOOD PRESSURE: 84 MMHG | BODY MASS INDEX: 36.58 KG/M2 | OXYGEN SATURATION: 96 %

## 2022-12-13 DIAGNOSIS — E66.01 CLASS 2 SEVERE OBESITY DUE TO EXCESS CALORIES WITH SERIOUS COMORBIDITY AND BODY MASS INDEX (BMI) OF 36.0 TO 36.9 IN ADULT: Chronic | ICD-10-CM

## 2022-12-13 DIAGNOSIS — E11.9 TYPE 2 DIABETES MELLITUS WITHOUT COMPLICATION, WITHOUT LONG-TERM CURRENT USE OF INSULIN: ICD-10-CM

## 2022-12-13 DIAGNOSIS — N52.9 ERECTILE DYSFUNCTION, UNSPECIFIED ERECTILE DYSFUNCTION TYPE: ICD-10-CM

## 2022-12-13 DIAGNOSIS — E78.1 HYPERTRIGLYCERIDEMIA: Chronic | ICD-10-CM

## 2022-12-13 DIAGNOSIS — Z00.00 ENCOUNTER FOR ANNUAL HEALTH EXAMINATION: Primary | ICD-10-CM

## 2022-12-13 DIAGNOSIS — I10 PRIMARY HYPERTENSION: Chronic | ICD-10-CM

## 2022-12-13 DIAGNOSIS — Z12.5 ENCOUNTER FOR SCREENING FOR MALIGNANT NEOPLASM OF PROSTATE: ICD-10-CM

## 2022-12-13 PROBLEM — E66.812 CLASS 2 SEVERE OBESITY DUE TO EXCESS CALORIES WITH SERIOUS COMORBIDITY AND BODY MASS INDEX (BMI) OF 36.0 TO 36.9 IN ADULT: Chronic | Status: ACTIVE | Noted: 2017-03-07

## 2022-12-13 PROBLEM — E66.9 OBESITY: Chronic | Status: ACTIVE | Noted: 2017-03-07

## 2022-12-13 PROCEDURE — 99396 PREV VISIT EST AGE 40-64: CPT | Performed by: INTERNAL MEDICINE

## 2022-12-13 PROCEDURE — 99214 OFFICE O/P EST MOD 30 MIN: CPT | Performed by: INTERNAL MEDICINE

## 2022-12-13 RX ORDER — SILDENAFIL CITRATE 20 MG/1
TABLET ORAL
Qty: 20 TABLET | Refills: 2 | Status: SHIPPED | OUTPATIENT
Start: 2022-12-13

## 2022-12-13 RX ORDER — ORAL SEMAGLUTIDE 14 MG/1
1 TABLET ORAL
Qty: 30 TABLET | Refills: 3 | Status: SHIPPED | OUTPATIENT
Start: 2022-12-13

## 2022-12-13 NOTE — ASSESSMENT & PLAN NOTE
Increase Rybelsus to 14 mg daily.  Continue metformin 2000 mg twice daily.     Lab Results   Component Value Date    HGBA1C 5.8 (H) 06/02/2022    HGBA1C 5.7 (H) 08/31/2021    HGBA1C 5.30 03/19/2021

## 2022-12-13 NOTE — ASSESSMENT & PLAN NOTE
Good weight loss on Rybelsus. Increase dose to 14 mg. Watch for GI side effects.     Wt Readings from Last 3 Encounters:   12/13/22 125 kg (276 lb)   06/02/22 128 kg (283 lb)   02/02/22 134 kg (295 lb)     Body mass index is 36.41 kg/m².

## 2022-12-13 NOTE — PROGRESS NOTES
"    I N T E R N A L  M E D I C I N E    J U N O H  K I M,  M D      ENCOUNTER DATE:  12/13/2022    Qasim SAUCEDO Ch / 52 y.o. / male    CHIEF COMPLAINT     Annual Exam (1/3/20)      VITALS     Vitals:    12/13/22 0939   BP: 140/84   Pulse: 76   Temp: 97.7 °F (36.5 °C)   SpO2: 96%   Weight: 125 kg (276 lb)   Height: 185.4 cm (73\")       BP Readings from Last 3 Encounters:   12/13/22 140/84   06/02/22 120/82   02/02/22 120/74     Wt Readings from Last 3 Encounters:   12/13/22 125 kg (276 lb)   06/02/22 128 kg (283 lb)   02/02/22 134 kg (295 lb)      Body mass index is 36.41 kg/m².    Blood pressure readings recorded on patient flowsheet:  No flowsheet data found.     MEDICATIONS     Current Outpatient Medications on File Prior to Visit   Medication Sig Dispense Refill   • acetaminophen (TYLENOL) 500 MG tablet Take 1,000 mg by mouth Every 6 (Six) Hours As Needed for Mild Pain .     • icosapent ethyl (VASCEPA) 1 g capsule capsule TAKE 2 CAPSULES BY MOUTH TWICE DAILY WITH MEALS 120 capsule 11   • lidocaine (LIDODERM) 5 % lidocaine 5 % topical patch   APPLY 1 PATCH TOPICALLY TO THE SKIN EVERY DAY AS NEEDED. MAY WEAR UP TO 12 HOURS     • lisinopril (PRINIVIL,ZESTRIL) 10 MG tablet TAKE 1 TABLET BY MOUTH DAILY 30 tablet 0   • metFORMIN (Glucophage) 1000 MG tablet Take 1 tablet by mouth 2 (Two) Times a Day With Meals. 180 tablet 3   • multivitamin (THERAGRAN) tablet tablet Take 1 tablet by mouth Every Night. HOLD FOR SURGERY     • rosuvastatin (CRESTOR) 10 MG tablet TAKE 1 TABLET BY MOUTH EVERY NIGHT 90 tablet 0   • TiZANidine (Zanaflex) 4 MG capsule Take 1 capsule by mouth At Night As Needed for Muscle Spasms. 30 capsule 0   • [DISCONTINUED] Rybelsus 7 MG tablet TAKE 1 TABLET BY MOUTH EVERY MORNING BEFORE BREAKFAST 30 tablet 6   • [DISCONTINUED] tiZANidine (ZANAFLEX) 4 MG tablet TAKE 1 TABLET BY MOUTH AT BEDTIME FOR LEG OR MUSCLE CRAMPS 30 tablet 2   • pantoprazole (PROTONIX) 40 MG EC tablet TAKE 1 TABLET BY MOUTH DAILY 90 " tablet 1     No current facility-administered medications on file prior to visit.         HISTORY OF PRESENT ILLNESS      Qasim presents for annual health maintenance visit.  Tolerating Rybelsus 7 mg daily without significant GI side effects.  He is making good progress in terms of weight loss and diabetes control.  He is interested in maximizing the dose of Rybelsus for additional weight loss benefit.  He complains of erectile dysfunction and is interested in ED medication in case becomes sexually active.  Hypertension remains stable on current medication.  He is on rosuvastatin 10 mg and Vascepa 4 g daily for hyperlipidemia with hypertriglyceridemia.    · General health: multiple medical problems  · Lifestyle:  · Attempting to lose weight?: Yes   · Diet: eats decently  · Exercise: does not exercise  · Tobacco: Former smoker and uses smokeless nicotine   · Alcohol: occasional/infrequent  · Work: Full-time  · Reproductive health:  · Sexually active?: No   · Concern for STD?: No   · Sexual problems?: erectile dysfunction   · Sees Urologist?: No   · Depression Screening:      PHQ-2/PHQ-9 Depression Screening 12/13/2022   Retired PHQ-9 Total Score -   Retired Total Score -   Little Interest or Pleasure in Doing Things 0-->not at all   Feeling Down, Depressed or Hopeless 0-->not at all   PHQ-9: Brief Depression Severity Measure Score 0         PHQ-2: 0 (Not depressed)     PHQ-9: 0 (Negative screening for depression)    Patient Care Team:  Narinder Ortiz MD as PCP - General (Internal Medicine)  ______________________________________________________________________    ALLERGIES  Allergies   Allergen Reactions   • Penicillin G Anaphylaxis        PFSH:     The following portions of the patient's history were reviewed and updated as appropriate: Allergies / Current Medications / Past Medical History / Surgical History / Social History / Family History    PROBLEM LIST   Patient Active Problem List   Diagnosis   • Degeneration  of intervertebral disc of lumbar region   • Diabetes mellitus, type II (HCC)   • Hypertension   • Hypertriglyceridemia   • Hypogonadism   • Erectile dysfunction   • Irritable bowel syndrome   • Intervertebral disc stenosis of neural canal   • Class 2 severe obesity due to excess calories with serious comorbidity and body mass index (BMI) of 36.0 to 36.9 in adult (HCC)   • Obstructive sleep apnea syndrome   • Polycythemia   • Steatosis of liver   • Ventral hernia without obstruction or gangrene   • Gastroesophageal reflux disease without esophagitis   • History of colon polyps   • Biliary dyskinesia   • Lumbosacral radiculopathy   • Spondylolisthesis of lumbar region   • Lumbosacral spondylosis   • History of arthrodesis   • Complication of surgical procedure       PAST MEDICAL HISTORY  Past Medical History:   Diagnosis Date   • Bilateral leg cramps     AT    • Colon polyps     FOLLOWED BY DR. SERA LE   • DDD (degenerative disc disease), lumbar 2018    L3,L4,L5   • Diabetes (HCC)     TYPE 2, NIDDM   • Disorder of gallbladder    • Diverticulitis 09/2016    SIGMOID   • Diverticulitis of colon 04/18/2014   • Fatty liver    • GERD (gastroesophageal reflux disease)    • Hyperlipidemia    • Hypertension    • Hypertriglyceridemia    • Hypogonadism 02/07/2013   • Impotence of organic origin 02/22/2012   • Irritable bowel syndrome 01/14/2013   • Lumbar compression fracture (HCC)     L4   • Lumbar radiculopathy    • Muscle spasm of left calf    • Numbness and tingling of foot     LEFT FOOT, S/P BACK SURGERY   • Obesity    • WOLF (obstructive sleep apnea)     cpap   • Plantar fasciitis 06/09/2016   • Polycythemia 02/22/2012   • Post laminectomy syndrome    • Right sided abdominal pain 02/28/2021    SEEN AT [facility] ER   • RUQ pain 05/02/2021    SEEN AT [facility] ER   • Spinal stenosis of lumbar region without neurogenic claudication 09/05/2018   • Splenomegaly 05/2021   • Spondylolisthesis        SURGICAL HISTORY  Past Surgical  History:   Procedure Laterality Date   • ANAL FISSURECTOMY N/A 04/15/2009    DR.RAYMOND MICHAELS AT [facility]   • CHOLECYSTECTOMY WITH INTRAOPERATIVE CHOLANGIOGRAM N/A 6/23/2021    Procedure: CHOLECYSTECTOMY LAPAROSCOPIC INTRAOPERATIVE CHOLANGIOGRAM;  Surgeon: Chris Carroin Jr., MD;  Location: [facility];  Service: General;  Laterality: N/A;   • COLON SURGERY N/A 09/16/2016    SIGMOID COLECTOMY, D/T DIVERTICULITIS, DR. TYLER DUNBAR AT [facility]   • COLONOSCOPY N/A 04/15/2009    MIXED ADENOMATOUS POLYP IN RECTUM, DR. HERLINDA MICHAELS AT [facility]   • COLONOSCOPY N/A 1/28/2021    7 MM HYPERPLASTIC POLYP IN RECTUM, MULTIPLE SMALL AND LARGE DIVERTICULA IN ENTIRE COLON, RESCOPE IN 5 YRS, DR. SERA LE AT [facility]   • COLONOSCOPY W/ POLYPECTOMY N/A 12/11/2013    2 TUBULOVILLOUS ADENOMA POLYPS IN CECUM, 8 MM TUBULOVILLOUS ADENOMA POLYP IN ASCENDING, 14 MM TUBULOVILLOUS ADENOMA POLYP IN DISTAL ASCENDING, 5 MM TUBULAR ADENOMA POLYP IN ASCENDING, 5 MM BENIGN POLYP IN RECTUM, RESCOPE IN 2 YRS, DR. SERA LE AT [facility]   • ENDOSCOPY AND COLONOSCOPY N/A 2014   • HEMORRHOIDECTOMY N/A 04/15/2009    DR.RAYMOND MICHAELS AT [facility]   • HERNIA REPAIR Left 09/14/2017    lap left inguinal hernia repair, ventral hernia repair, DR. TYLER DUNBAR AT [facility]   • LUMBAR EPIDURAL INJECTION N/A     lumbar   • LUMBAR SPINE POSTERIOR N/A 12/03/2018    TLIF L3-L5, DR. MOHAMMAD MAJD AT [facility]    • TONSILLECTOMY Bilateral    • VENTRAL HERNIA REPAIR N/A 03/01/2018    LAPAROSCOPIC WITH PREVIOUS MESH REMOVAL, DR. TYLER DUNBAR AT [facility]       SOCIAL HISTORY  Social History     Socioeconomic History   • Marital status:    • Number of children: 2   Tobacco Use   • Smoking status: Former     Packs/day: 1.00     Years: 10.00     Pack years: 10.00     Types: Cigarettes     Quit date: 2012     Years since quitting: 10.9   • Smokeless tobacco: Current     Types: Chew   Vaping Use   • Vaping Use: Never used   Substance and Sexual Activity   • Alcohol use: Not Currently   •  Drug use: No   • Sexual activity: Not Currently       FAMILY HISTORY  Family History   Problem Relation Age of Onset   • No Known Problems Mother    • Colon cancer Father    • Malig Hyperthermia Neg Hx    • Thyroid cancer Neg Hx        IMMUNIZATION HISTORY  Immunization History   Administered Date(s) Administered   • Flu Vaccine Intradermal Quad 18-64YR 10/28/2021   • FluLaval/Fluzone >6mos 01/29/2018   • Fluzone Quad >6mos (Multi-dose) 11/09/2019   • Influenza, Unspecified 11/09/2018   • Pneumococcal Conjugate 20-Valent (PCV20) 06/02/2022         REVIEW OF SYSTEMS     Review of Systems   Constitutional: Negative.         Weight loss  (intended)   HENT: Negative.    Eyes: Negative.    Respiratory: Negative.    Cardiovascular: Negative.  Negative for chest pain.   Gastrointestinal: Negative.  Negative for abdominal pain.   Endocrine: Negative.    Genitourinary: Negative.         Erectile dysfunction   Musculoskeletal: Negative.    Skin: Negative.    Allergic/Immunologic: Negative.    Neurological: Negative.    Hematological: Negative.    Psychiatric/Behavioral: Negative.          PHYSICAL EXAMINATION     Physical Exam  Constitutional:       General: He is not in acute distress.     Appearance: He is well-developed. He is obese.   HENT:      Head: Normocephalic and atraumatic.      Right Ear: Tympanic membrane, ear canal and external ear normal. There is impacted cerumen (Cerumen accumulation).      Left Ear: Tympanic membrane, ear canal and external ear normal. There is impacted cerumen (Cerumen accumulation).   Eyes:      General: No scleral icterus.     Conjunctiva/sclera: Conjunctivae normal.      Pupils: Pupils are equal, round, and reactive to light.   Neck:      Thyroid: No thyroid mass or thyromegaly.      Vascular: No carotid bruit.      Trachea: No tracheal deviation.   Cardiovascular:      Rate and Rhythm: Normal rate and regular rhythm.      Pulses: Normal pulses.      Heart sounds: Normal heart sounds.       Comments: No carotid bruit  Pulmonary:      Effort: Pulmonary effort is normal.      Breath sounds: Normal breath sounds.   Abdominal:      General: There is no distension.      Palpations: Abdomen is soft. There is no mass.      Tenderness: There is no abdominal tenderness.      Hernia: No hernia is present.      Comments: Protuberant abdomen   Musculoskeletal:      Cervical back: Neck supple.      Right lower leg: No edema.      Left lower leg: No edema.   Feet:      Comments: Diabetic Foot Exam Performed and Monofilament Test Performed     Monofilament test is normal.   Lymphadenopathy:      Cervical: No cervical adenopathy.      Upper Body:      Right upper body: No axillary adenopathy.      Left upper body: No axillary adenopathy.   Skin:     General: Skin is warm.      Coloration: Skin is not jaundiced or pale.      Findings: No lesion (Negative for suspicious skin lesions/growths) or rash.      Comments: No jaundice  No suspicious skin lesions.    Neurological:      Mental Status: He is alert and oriented to person, place, and time.      Cranial Nerves: No cranial nerve deficit.      Motor: No abnormal muscle tone.      Deep Tendon Reflexes: Reflexes normal.   Psychiatric:         Mood and Affect: Mood normal.         Behavior: Behavior normal.         Thought Content: Thought content normal.         Judgment: Judgment normal.         REVIEWED DATA      Labs:    Lab Results   Component Value Date     06/02/2022    K 4.3 06/02/2022    CALCIUM 9.7 06/02/2022    AST 37 06/02/2022    ALT 35 06/02/2022    BUN 10 06/02/2022    CREATININE 0.74 (L) 06/02/2022    CREATININE 0.81 08/31/2021    CREATININE 0.76 06/17/2021    EGFRIFNONA 101 08/31/2021    EGFRIFAFRI >150 05/04/2021       Lab Results   Component Value Date    GLUCOSE 95 06/02/2022    HGBA1C 5.8 (H) 06/02/2022    HGBA1C 5.7 (H) 08/31/2021    HGBA1C 5.30 03/19/2021    TSH 0.429 12/27/2019       Lab Results   Component Value Date    PSA 0.492  12/27/2019    PSA 0.3 03/08/2017       Lab Results   Component Value Date    TESTOSTERONE 375 03/08/2017    TESTOSTERONE 450 06/14/2016       Lab Results   Component Value Date    LDL 39 06/02/2022    HDL 23 (L) 06/02/2022    TRIG 284 (H) 06/02/2022    CHOLHDLRATIO 4.6 06/02/2022       No components found for: [id number]    Lab Results   Component Value Date    WBC 8.5 06/02/2022    HGB 16.0 06/02/2022    MCV 91 06/02/2022     06/02/2022       Lab Results   Component Value Date    GLUCOSEU Negative 05/02/2021    BLOODU Negative 05/02/2021    NITRITEU Negative 05/02/2021    LEUKOCYTESUR Negative 05/02/2021        No results found for: HEPCVIRUSABY    Imaging:           Medical Tests:           ASSESSMENT & PLAN     ANNUAL WELLNESS EXAM / PHYSICAL     Other medical problems addressed today:  Problem List Items Addressed This Visit        High    Diabetes mellitus, type II (HCC) (Chronic)    Current Assessment & Plan     Increase Rybelsus to 14 mg daily.  Continue metformin 2000 mg twice daily.     Lab Results   Component Value Date    HGBA1C 5.8 (H) 06/02/2022    HGBA1C 5.7 (H) 08/31/2021    HGBA1C 5.30 03/19/2021             Relevant Medications    metFORMIN (Glucophage) 1000 MG tablet    Semaglutide (Rybelsus) 14 MG tablet    Hypertension (Chronic)    Overview     Continue lisinopril 10 mg qd.          Relevant Medications    lisinopril (PRINIVIL,ZESTRIL) 10 MG tablet       Medium    Hypertriglyceridemia (Chronic)    Overview     Continue rosuvastatin and Vascepa.          Relevant Medications    icosapent ethyl (VASCEPA) 1 g capsule capsule    rosuvastatin (CRESTOR) 10 MG tablet       Low    Erectile dysfunction (Chronic)    Current Assessment & Plan     Sildenafil 20 mg 4 to 5 tablets as needed for sexual activity.    Discussed absolute contraindication with nitroglycerine. Instructed to go to ED if he has unwanted prolonged or painful erection, loss of vision/hearing.           Relevant Medications     sildenafil (REVATIO) 20 MG tablet    Class 2 severe obesity due to excess calories with serious comorbidity and body mass index (BMI) of 36.0 to 36.9 in adult (HCC) (Chronic)    Current Assessment & Plan     Good weight loss on Rybelsus. Increase dose to 14 mg. Watch for GI side effects.     Wt Readings from Last 3 Encounters:   12/13/22 125 kg (276 lb)   06/02/22 128 kg (283 lb)   02/02/22 134 kg (295 lb)     Body mass index is 36.41 kg/m².           Other Visit Diagnoses     Encounter for annual health examination    -  Primary    Relevant Orders    Comprehensive Metabolic Panel    Hemoglobin A1c    Hepatitis C Antibody    PSA Screen    Lipid Panel With / Chol / HDL Ratio    Encounter for screening for malignant neoplasm of prostate        Relevant Orders    PSA Screen          Summary/Discussion:     · Primary reason for today's visit was for annual health examination. However above active medical issues also needed to be addressed today.      Next Appointment with me: Visit date not found    Return in about 4 months (around 4/13/2023) for Diabetes, Hypertension.      HEALTHCARE MAINTENANCE ISSUES       Cancer Screening:  · Colon: Initial/Next screening at age: CURRENT and -Colonoscopy  · Repeat colon cancer screening: every 5 years  · Prostate: Performed today and recommended annual screening  · Testicular: Recommended monthly self exam  · Skin: Monthly self skin examination, annual exam by health professional  · Lung: Does not meet criteria for lung cancer screening.   · Other:    Screening Labs & Tests:  · Lab results reviewed & discussed with with patient or orders placed today.  · EKG:  · CV Screening: Lipid panel  · DEXA (75+ or risk factors):   · HEP C (If born [id number] or risk factors): Ordered  · Other:     Immunization/Vaccinations (to be given today unless deferred by patient)  · Influenza: Patient deferred/declined flu vaccine (recommended annual vaccination)  · Had the flu this year  · Hepatitis A:  Recommended here or at pharmacy  · Hepatitis B: Recommended here or at pharmacy  · Tetanus/Pertussis: Up to date and Verify immunization records  · Pneumococcal: Up to date  · Shingles: Recommended Shingrix at pharmacy  · COVID: Plans to not take the vaccine  Lifestyle Counseling:  · Lifestyle Modifications: Attempt to lose weight, Improve dietary compliance, Begin progressive aerobic exercise program 3-5 days a week, Maintain a low sugar/carbohydrate diet, Follow a low fat, low cholesterol diet, Maintain low sodium diet (< 3 gm) for blood pressure, Make dinner the lightest meal of day, Quit chewing tobacco and Discussed sexual issues, safe sex practices, contraception  · Safety Issues: Always wear seatbelt, Avoid texting while driving   · Use sunscreen, regular skin examination  · Recommended annual dental/vision examination.  · Emotional/Stress/Sleep: Reviewed and  given when appropriate      Health Maintenance   Topic Date Due   • Hepatitis B (1 of 3 - 3-dose series) Never done   • TDAP/TD VACCINES (1 - Tdap) Never done   • HEPATITIS C SCREENING  Never done   • DIABETIC FOOT EXAM  01/03/2021   • ZOSTER VACCINE (1 of 2) Never done   • HEMOGLOBIN A1C  12/02/2022   • PROSTATE CANCER SCREENING  12/13/2022   • COVID-19 Vaccine (1) 12/15/2022 (Originally 1970)   • INFLUENZA VACCINE  03/31/2023 (Originally 8/1/2022)   • DIABETIC EYE EXAM  02/18/2023   • LIPID PANEL  06/02/2023   • URINE MICROALBUMIN  06/02/2023   • ANNUAL PHYSICAL  12/14/2023   • COLORECTAL CANCER SCREENING  01/28/2026   • Pneumococcal Vaccine 0-64  Completed           *Examiner was wearing KN95 mask and eye protection during the entire duration of the visit. Patient was masked the entire time. Minimum social distance of 6 ft maintained entire visit except if physical contact was necessary as documented.       Template created by Jennifer Ortiz MD

## 2022-12-13 NOTE — ASSESSMENT & PLAN NOTE
Sildenafil 20 mg 4 to 5 tablets as needed for sexual activity.    Discussed absolute contraindication with nitroglycerine. Instructed to go to ED if he has unwanted prolonged or painful erection, loss of vision/hearing.

## 2022-12-14 LAB
ALBUMIN SERPL-MCNC: 4.8 G/DL (ref 3.5–5.2)
ALBUMIN/GLOB SERPL: 2 G/DL
ALP SERPL-CCNC: 59 U/L (ref 39–117)
ALT SERPL-CCNC: 31 U/L (ref 1–41)
AST SERPL-CCNC: 30 U/L (ref 1–40)
BILIRUB SERPL-MCNC: 0.4 MG/DL (ref 0–1.2)
BUN SERPL-MCNC: 11 MG/DL (ref 6–20)
BUN/CREAT SERPL: 15.9 (ref 7–25)
CALCIUM SERPL-MCNC: 9.4 MG/DL (ref 8.6–10.5)
CHLORIDE SERPL-SCNC: 100 MMOL/L (ref 98–107)
CHOLEST SERPL-MCNC: 93 MG/DL (ref 0–200)
CHOLEST/HDLC SERPL: 3.32 {RATIO}
CO2 SERPL-SCNC: 29 MMOL/L (ref 22–29)
CREAT SERPL-MCNC: 0.69 MG/DL (ref 0.76–1.27)
EGFRCR SERPLBLD CKD-EPI 2021: 112.7 ML/MIN/1.73
GLOBULIN SER CALC-MCNC: 2.4 GM/DL
GLUCOSE SERPL-MCNC: 78 MG/DL (ref 65–99)
HBA1C MFR BLD: 5.3 % (ref 4.8–5.6)
HCV AB S/CO SERPL IA: <0.1 S/CO RATIO (ref 0–0.9)
HDLC SERPL-MCNC: 28 MG/DL (ref 40–60)
LDLC SERPL CALC-MCNC: 39 MG/DL (ref 0–100)
POTASSIUM SERPL-SCNC: 4.1 MMOL/L (ref 3.5–5.2)
PROT SERPL-MCNC: 7.2 G/DL (ref 6–8.5)
PSA SERPL-MCNC: 0.43 NG/ML (ref 0–4)
SODIUM SERPL-SCNC: 139 MMOL/L (ref 136–145)
TRIGL SERPL-MCNC: 153 MG/DL (ref 0–150)
VLDLC SERPL CALC-MCNC: 26 MG/DL (ref 5–40)

## 2022-12-14 NOTE — PROGRESS NOTES
MyChart:    Here are the result(s) of your test(s):     1. A1c for average glucose level is significantly better.   2. LDL cholesterol is good. HDL remains quite low. Continue working on weight loss.   3. Kidney function, liver labs are electrolytes are stable/normal.   4. PSA (for prostate cancer screening) is stable  5. Hepatitis C screening is negative.       Please do not hesitate to contact me if you have questions.

## 2023-02-05 DIAGNOSIS — E11.9 TYPE 2 DIABETES MELLITUS WITHOUT COMPLICATION, WITHOUT LONG-TERM CURRENT USE OF INSULIN: ICD-10-CM

## 2023-02-14 DIAGNOSIS — M62.831 MUSCLE SPASM OF LEFT CALF: ICD-10-CM

## 2023-02-14 RX ORDER — TIZANIDINE HYDROCHLORIDE 4 MG/1
4 CAPSULE, GELATIN COATED ORAL NIGHTLY PRN
Qty: 30 CAPSULE | Refills: 0 | Status: SHIPPED | OUTPATIENT
Start: 2023-02-14 | End: 2023-03-13

## 2023-02-28 DIAGNOSIS — E78.1 PRIMARY HYPERTRIGLYCERIDEMIA: ICD-10-CM

## 2023-02-28 RX ORDER — ROSUVASTATIN CALCIUM 10 MG/1
10 TABLET, COATED ORAL NIGHTLY
Qty: 90 TABLET | Refills: 0 | Status: SHIPPED | OUTPATIENT
Start: 2023-02-28

## 2023-03-13 DIAGNOSIS — M62.831 MUSCLE SPASM OF LEFT CALF: ICD-10-CM

## 2023-03-13 RX ORDER — TIZANIDINE 4 MG/1
TABLET ORAL
Qty: 30 TABLET | Refills: 2 | Status: SHIPPED | OUTPATIENT
Start: 2023-03-13

## 2023-04-10 ENCOUNTER — OFFICE VISIT (OUTPATIENT)
Dept: INTERNAL MEDICINE | Age: 53
End: 2023-04-10
Payer: COMMERCIAL

## 2023-04-10 VITALS
DIASTOLIC BLOOD PRESSURE: 64 MMHG | HEART RATE: 68 BPM | WEIGHT: 278 LBS | SYSTOLIC BLOOD PRESSURE: 114 MMHG | OXYGEN SATURATION: 96 % | HEIGHT: 73 IN | BODY MASS INDEX: 36.84 KG/M2 | TEMPERATURE: 98.9 F

## 2023-04-10 DIAGNOSIS — K58.2 IRRITABLE BOWEL SYNDROME WITH BOTH CONSTIPATION AND DIARRHEA: ICD-10-CM

## 2023-04-10 DIAGNOSIS — K21.9 GASTROESOPHAGEAL REFLUX DISEASE WITHOUT ESOPHAGITIS: Chronic | ICD-10-CM

## 2023-04-10 DIAGNOSIS — Z86.010 HISTORY OF COLON POLYPS: ICD-10-CM

## 2023-04-10 DIAGNOSIS — E66.01 CLASS 2 SEVERE OBESITY DUE TO EXCESS CALORIES WITH SERIOUS COMORBIDITY AND BODY MASS INDEX (BMI) OF 36.0 TO 36.9 IN ADULT: Chronic | ICD-10-CM

## 2023-04-10 DIAGNOSIS — K57.30 DIVERTICULOSIS OF COLON: Primary | ICD-10-CM

## 2023-04-10 PROBLEM — Z87.19 HX OF DIVERTICULITIS OF COLON: Status: ACTIVE | Noted: 2023-04-10

## 2023-04-10 RX ORDER — CIPROFLOXACIN 500 MG/1
500 TABLET, FILM COATED ORAL 2 TIMES DAILY
Qty: 14 TABLET | Refills: 0 | Status: CANCELLED | OUTPATIENT
Start: 2023-04-10 | End: 2023-04-17

## 2023-04-10 RX ORDER — PANTOPRAZOLE SODIUM 40 MG/1
40 TABLET, DELAYED RELEASE ORAL DAILY
Qty: 90 TABLET | Refills: 1 | Status: SHIPPED | OUTPATIENT
Start: 2023-04-10

## 2023-04-10 RX ORDER — CIPROFLOXACIN 500 MG/1
500 TABLET, FILM COATED ORAL 2 TIMES DAILY
Qty: 14 TABLET | Refills: 0 | Status: SHIPPED | OUTPATIENT
Start: 2023-04-10 | End: 2023-04-17

## 2023-04-10 RX ORDER — METRONIDAZOLE 500 MG/1
500 TABLET ORAL 3 TIMES DAILY
Qty: 21 TABLET | Refills: 0 | Status: SHIPPED | OUTPATIENT
Start: 2023-04-10 | End: 2023-04-17

## 2023-04-10 NOTE — PROGRESS NOTES
I N T E R N A L  M E D I C I N E  Barbara Mcclure, APRN    ENCOUNTER DATE:  04/10/2023    Qasim Valentinepherd / 52 y.o. / male      CHIEF COMPLAINT / REASON FOR OFFICE VISIT     Abdominal Pain (Right lower abdomen, since Friday, pt had fever over the weekend, pt has had diverticulitis previously, is worried its flaring up)      ASSESSMENT & PLAN     Diagnoses and all orders for this visit:    1. Diverticulosis of colon (Primary)  Assessment & Plan:  Patient with acute right lower abdominal pain. Hx Diverticulosis, GERD, IBS.  Has had his GB removed. Patient states same symptoms as previous flare of Diverticulosis. CT abdomen ordered to rule out abscess, constipation. Rx for flagyl and cipro to pharmacy as patient running a fever over the week end and mild one today at 98.9.     Instructed liquid diet until pain subsides then instructed on low fiber foods such as eggs, chicken fish, white bread but in smaller portions. Then progress to a normal diet high in fiber. Instructed on Fiber supplements as well.     Orders:  -     CT Abdomen Pelvis Without Contrast    2. Irritable bowel syndrome with both constipation and diarrhea    3. Gastroesophageal reflux disease without esophagitis  Assessment & Plan:  Referral to Gastroenterology if needed      4. History of colon polyps    5. Class 2 severe obesity due to excess calories with serious comorbidity and body mass index (BMI) of 36.0 to 36.9 in adult (HCC)  Assessment & Plan:  Patient's (Body mass index is 36.68 kg/m².) indicates that they are obese (BMI >30) with health conditions that include diabetes mellitus, dyslipidemias and GERD . Weight is unchanged. BMI  is above average; BMI management plan is completed. We discussed low calorie, low carb based diet program, portion control, increasing exercise, joining a fitness center or start home based exercise program and Weight Watchers or other Commercial based weight reduction program.       Other orders  -      "pantoprazole (PROTONIX) 40 MG EC tablet; Take 1 tablet by mouth Daily.  Dispense: 90 tablet; Refill: 1  -     metroNIDAZOLE (Flagyl) 500 MG tablet; Take 1 tablet by mouth 3 (Three) Times a Day for 7 days.  Dispense: 21 tablet; Refill: 0  -     ciprofloxacin (Cipro) 500 MG tablet; Take 1 tablet by mouth 2 (Two) Times a Day for 7 days.  Dispense: 14 tablet; Refill: 0       SUMMARY/DISCUSSION  • Follow up with Dr Ortiz scheduled 5/2/23    Return in about 22 days (around 5/2/2023) for Next scheduled follow up.      VITAL SIGNS     Visit Vitals  /64 (BP Location: Left arm, Patient Position: Sitting, Cuff Size: Large Adult)   Pulse 68   Temp 98.9 °F (37.2 °C) (Temporal)   Ht 185.4 cm (73\")   Wt 126 kg (278 lb)   SpO2 96%   BMI 36.68 kg/m²           BP Readings from Last 3 Encounters:   04/10/23 114/64   12/13/22 140/84   06/02/22 120/82     Wt Readings from Last 3 Encounters:   04/10/23 126 kg (278 lb)   12/13/22 125 kg (276 lb)   06/02/22 128 kg (283 lb)     Body mass index is 36.68 kg/m².    Blood pressure readings recorded on patient flowsheet:  No flowsheet data found.          MEDICATIONS AT THE TIME OF OFFICE VISIT     Current Outpatient Medications on File Prior to Visit   Medication Sig Dispense Refill   • acetaminophen (TYLENOL) 500 MG tablet Take 2 tablets by mouth Every 6 (Six) Hours As Needed for Mild Pain.     • icosapent ethyl (VASCEPA) 1 g capsule capsule TAKE 2 CAPSULES BY MOUTH TWICE DAILY WITH MEALS 120 capsule 11   • lidocaine (LIDODERM) 5 % lidocaine 5 % topical patch   APPLY 1 PATCH TOPICALLY TO THE SKIN EVERY DAY AS NEEDED. MAY WEAR UP TO 12 HOURS     • lisinopril (PRINIVIL,ZESTRIL) 10 MG tablet TAKE 1 TABLET BY MOUTH DAILY 30 tablet 0   • metFORMIN (GLUCOPHAGE) 1000 MG tablet TAKE 1 TABLET BY MOUTH TWICE DAILY WITH MEALS 180 tablet 3   • multivitamin (THERAGRAN) tablet tablet Take 1 tablet by mouth Every Night. HOLD FOR SURGERY     • rosuvastatin (CRESTOR) 10 MG tablet TAKE 1 TABLET BY MOUTH " EVERY NIGHT 90 tablet 0   • Semaglutide (Rybelsus) 14 MG tablet Take 1 tablet by mouth Every Morning Before Breakfast. 30 tablet 3   • sildenafil (REVATIO) 20 MG tablet Take 2-5 tablets 1 hour before sexual activity as needed 20 tablet 2   • tiZANidine (ZANAFLEX) 4 MG tablet TAKE 1 TABLET BY MOUTH AT NIGHT AS NEEDED FOR MUSCLE SPASMS 30 tablet 2   • [DISCONTINUED] pantoprazole (PROTONIX) 40 MG EC tablet TAKE 1 TABLET BY MOUTH DAILY 90 tablet 1     No current facility-administered medications on file prior to visit.        HISTORY OF PRESENT ILLNESS   52 year old male being seen today for concern for right lower sided abdominal pain since this past Friday with low grade fever over the weekend. Patient with Hx of Diverticulosis and patient is worried its flaring up. Patient states was on Protonix in the past but has not taken any in several years. States that he did take a Hydrocodone over the week end with some relief of the pain. Patient states last stool was Saturday and it was liquid but no signs of blood in it stated. States has been eating fair without nausea or vomiting.     Patient Care Team:  Narinder Ortiz MD as PCP - General (Internal Medicine)    REVIEW OF SYSTEMS     Review of Systems   Constitutional: Positive for fever.   Gastrointestinal: Positive for abdominal pain and diarrhea. Negative for blood in stool, nausea and vomiting.   Endocrine: Negative for polydipsia, polyphagia and polyuria.   Genitourinary: Negative.    Musculoskeletal: Negative for back pain.   Neurological: Negative for dizziness and headaches.          PHYSICAL EXAMINATION     Physical Exam  Constitutional:       General: He is not in acute distress.  Cardiovascular:      Rate and Rhythm: Normal rate and regular rhythm.      Pulses: Normal pulses.      Heart sounds: Normal heart sounds.   Pulmonary:      Effort: Pulmonary effort is normal. No respiratory distress.      Breath sounds: Normal breath sounds.   Abdominal:      General:  There is distension.      Palpations: Abdomen is soft.      Tenderness: There is abdominal tenderness.      Comments: Right lower quadrant abdominal tenderness, diminished bowel sounds but states having diarrhea   Skin:     General: Skin is warm and dry.   Neurological:      General: No focal deficit present.      Mental Status: He is alert and oriented to person, place, and time. Mental status is at baseline.      Cranial Nerves: No cranial nerve deficit.   Psychiatric:         Mood and Affect: Mood normal.         Behavior: Behavior normal.         Thought Content: Thought content normal.         Judgment: Judgment normal.             REVIEWED DATA     Labs:     Lab Results   Component Value Date     12/13/2022    K 4.1 12/13/2022    CALCIUM 9.4 12/13/2022    AST 30 12/13/2022    ALT 31 12/13/2022    BUN 11 12/13/2022    CREATININE 0.69 (L) 12/13/2022    CREATININE 0.74 (L) 06/02/2022    CREATININE 0.81 08/31/2021    EGFRIFNONA 101 08/31/2021    EGFRIFAFRI >150 05/04/2021       Lab Results   Component Value Date    HGBA1C 5.30 12/13/2022    HGBA1C 5.8 (H) 06/02/2022    HGBA1C 5.7 (H) 08/31/2021       Lab Results   Component Value Date    LDL 39 12/13/2022    LDL 39 06/02/2022    LDL 48 01/04/2022    HDL 28 (L) 12/13/2022    HDL 23 (L) 06/02/2022    TRIG 153 (H) 12/13/2022    TRIG 284 (H) 06/02/2022       Lab Results   Component Value Date    TSH 0.429 12/27/2019    TSH 0.45 03/08/2017    TSH 0.36 (L) 06/14/2016       Lab Results   Component Value Date    WBC 8.5 06/02/2022    HGB 16.0 06/02/2022     06/02/2022       Lab Results   Component Value Date    MALBCRERATIO 5 06/02/2022          Imaging:           Medical Tests:           Summary of old records / correspondence / consultant report:           Request outside records:           DIMPLE Mallory

## 2023-04-10 NOTE — ASSESSMENT & PLAN NOTE
Patient with acute right lower abdominal pain. Hx Diverticulosis, GERD, IBS.  Has had his GB removed. Patient states same symptoms as previous flare of Diverticulosis. CT abdomen ordered to rule out abscess, constipation. Rx for flagyl and cipro to pharmacy as patient running a fever over the week end and mild one today at 98.9.     Instructed liquid diet until pain subsides then instructed on low fiber foods such as eggs, chicken fish, white bread but in smaller portions. Then progress to a normal diet high in fiber. Instructed on Fiber supplements as well.

## 2023-04-10 NOTE — ASSESSMENT & PLAN NOTE
Patient's (Body mass index is 36.68 kg/m².) indicates that they are obese (BMI >30) with health conditions that include diabetes mellitus, dyslipidemias and GERD . Weight is unchanged. BMI  is above average; BMI management plan is completed. We discussed low calorie, low carb based diet program, portion control, increasing exercise, joining a fitness center or start home based exercise program and Weight Watchers or other Commercial based weight reduction program.

## 2023-04-14 ENCOUNTER — HOSPITAL ENCOUNTER (OUTPATIENT)
Dept: CT IMAGING | Facility: HOSPITAL | Age: 53
Discharge: HOME | End: 2023-04-14
Admitting: NURSE PRACTITIONER
Payer: COMMERCIAL

## 2023-04-14 PROCEDURE — 74176 CT ABD & PELVIS W/O CONTRAST: CPT

## 2023-04-19 ENCOUNTER — TELEPHONE (OUTPATIENT)
Dept: INTERNAL MEDICINE | Age: 53
End: 2023-04-19

## 2023-04-19 DIAGNOSIS — K57.30 DIVERTICULOSIS OF COLON: Primary | ICD-10-CM

## 2023-04-19 NOTE — TELEPHONE ENCOUNTER
"  Caller: Qasim Ch \"FLORY\"    Relationship: Self    Best call back number: [phone]    What was the call regarding: PATIENT STATED THAT J[facility]ON POOLE PRESCRIBED TWO ANTIBIOTICS ON 04/10/23.    HE STATED HE HAS FINISHED THE ANTIBIOTICS AND IS STARTING TO HAVE THE DISCOMFORT IN HIS LOWER RIGHT ABDOMEN AGAIN.    HE WOULD LIKE TO KNOW WHAT TO DO.    PLEASE CALL TO ADVISE.    Do you require a callback: YES  "

## 2023-05-01 DIAGNOSIS — E11.9 TYPE 2 DIABETES MELLITUS WITHOUT COMPLICATION, WITHOUT LONG-TERM CURRENT USE OF INSULIN: ICD-10-CM

## 2023-05-01 RX ORDER — ORAL SEMAGLUTIDE 14 MG/1
1 TABLET ORAL
Qty: 30 TABLET | Refills: 3 | Status: SHIPPED | OUTPATIENT
Start: 2023-05-01

## 2023-05-02 ENCOUNTER — OFFICE VISIT (OUTPATIENT)
Dept: INTERNAL MEDICINE | Age: 53
End: 2023-05-02
Payer: COMMERCIAL

## 2023-05-02 VITALS
TEMPERATURE: 97.5 F | SYSTOLIC BLOOD PRESSURE: 140 MMHG | BODY MASS INDEX: 36.84 KG/M2 | HEART RATE: 79 BPM | DIASTOLIC BLOOD PRESSURE: 88 MMHG | HEIGHT: 73 IN | WEIGHT: 278 LBS | OXYGEN SATURATION: 96 %

## 2023-05-02 DIAGNOSIS — I10 PRIMARY HYPERTENSION: Chronic | ICD-10-CM

## 2023-05-02 DIAGNOSIS — K74.60 CIRRHOSIS OF LIVER WITHOUT ASCITES, UNSPECIFIED HEPATIC CIRRHOSIS TYPE: Primary | ICD-10-CM

## 2023-05-02 DIAGNOSIS — E66.01 CLASS 2 SEVERE OBESITY DUE TO EXCESS CALORIES WITH SERIOUS COMORBIDITY AND BODY MASS INDEX (BMI) OF 36.0 TO 36.9 IN ADULT: Chronic | ICD-10-CM

## 2023-05-02 DIAGNOSIS — E11.9 TYPE 2 DIABETES MELLITUS WITHOUT COMPLICATION, WITHOUT LONG-TERM CURRENT USE OF INSULIN: Chronic | ICD-10-CM

## 2023-05-02 DIAGNOSIS — E78.1 HYPERTRIGLYCERIDEMIA: Chronic | ICD-10-CM

## 2023-05-02 NOTE — ASSESSMENT & PLAN NOTE
Most recent CT of the abdomen shows findings consistent with cirrhosis of the liver.  Will refer him to GI/liver specialist for further evaluation.  For now we will check hepatitis labs to assess for specific causes of liver disease.  I suspect his primary underlying liver problem is steatohepatitis.    Administered her first dose of hepatitis A vaccine today and second dose in 6 months.  He is confident that he had his hepatitis B series for or in the past however we will verify this with serology study.

## 2023-05-02 NOTE — ASSESSMENT & PLAN NOTE
Lab Results   Component Value Date    HGBA1C 5.30 12/13/2022    HGBA1C 5.8 (H) 06/02/2022    HGBA1C 5.7 (H) 08/31/2021    CREATININE 0.69 (L) 12/13/2022    LDL 39 12/13/2022    MALBCRERATIO 5 06/02/2022        Wt Readings from Last 3 Encounters:   05/02/23 126 kg (278 lb)   04/10/23 126 kg (278 lb)   12/13/22 125 kg (276 lb)     Body mass index is 36.68 kg/m².     Continue Rybelsus 14 mg and metformin 1000 mg BID.   Wait loss has reached a plateau.

## 2023-05-02 NOTE — PROGRESS NOTES
I N T E R N A L  M E D I C I N E    J U N O H  K I M,  M D      ENCOUNTER DATE:  05/02/2023    Qasim Quirozerd / 52 y.o. / male    CHIEF COMPLAINT / REASON FOR OFFICE VISIT     Diabetes and Hypertension      ASSESSMENT & PLAN     Problem List Items Addressed This Visit        Medium    Diabetes mellitus, type II (Chronic)    Current Assessment & Plan     Lab Results   Component Value Date    HGBA1C 5.30 12/13/2022    HGBA1C 5.8 (H) 06/02/2022    HGBA1C 5.7 (H) 08/31/2021    CREATININE 0.69 (L) 12/13/2022    LDL 39 12/13/2022    MALBCRERATIO 5 06/02/2022        Wt Readings from Last 3 Encounters:   05/02/23 126 kg (278 lb)   04/10/23 126 kg (278 lb)   12/13/22 125 kg (276 lb)     Body mass index is 36.68 kg/m².     Continue Rybelsus 14 mg and metformin 1000 mg BID.   Wait loss has reached a plateau.          Relevant Medications    metFORMIN (GLUCOPHAGE) 1000 MG tablet    Rybelsus 14 MG tablet    Hypertension (Chronic)    Overview     Continue lisinopril 10 mg qd.          Relevant Medications    lisinopril (PRINIVIL,ZESTRIL) 10 MG tablet    Hypertriglyceridemia (Chronic)    Overview     Continue rosuvastatin and Vascepa.          Relevant Medications    icosapent ethyl (VASCEPA) 1 g capsule capsule    rosuvastatin (CRESTOR) 10 MG tablet       Low    Class 2 severe obesity due to excess calories with serious comorbidity and body mass index (BMI) of 36.0 to 36.9 in adult (HCC) (Chronic)   Other Visit Diagnoses     Cirrhosis of liver without ascites, unspecified hepatic cirrhosis type    -  Primary    Relevant Orders    Ambulatory Referral to Gastroenterology    Comprehensive Metabolic Panel    Lipid Panel With / Chol / HDL Ratio    Hemoglobin A1c    Microalbumin / Creatinine Urine Ratio - Urine, Clean Catch    CBC & Differential    Alpha - 1 - Antitrypsin Deficiency    APARNA With / DsDNA, RNP, Sjogrens A / B, Woods    ANCA Panel    Angiotensin Converting Enzyme    Anti-Smooth Muscle Antibody Titer     Ceruloplasmin    Ferritin    Hemochromatosis Mutation    Hepatitis B Core Antibody, Total    Hepatitis B Surf Antibody Quant    Hepatitis B Surface Antigen    Iron & TIBC (Direct)    Mitochondrial Antibodies, M2    Transferrin Saturation    Celiac Ab tTG DGP TIgA    APTT    Protime-INR        Orders Placed This Encounter   Procedures   • Hepatitis A Vaccine Adult IM   • Comprehensive Metabolic Panel   • Lipid Panel With / Chol / HDL Ratio   • Hemoglobin A1c   • Microalbumin / Creatinine Urine Ratio - Urine, Clean Catch   • Alpha - 1 - Antitrypsin Deficiency   • APARNA With / DsDNA, RNP, Sjogrens A / B, Woods   • ANCA Panel   • Angiotensin Converting Enzyme   • Anti-Smooth Muscle Antibody Titer   • Ceruloplasmin   • Ferritin   • Hemochromatosis Mutation   • Hepatitis B Core Antibody, Total   • Hepatitis B Surf Antibody Quant   • Hepatitis B Surface Antigen   • Iron & TIBC (Direct)   • Mitochondrial Antibodies, M2   • Transferrin Saturation   • Celiac Ab tTG DGP TIgA   • APTT   • Protime-INR   • Ambulatory Referral to Gastroenterology   • CBC & Differential     No orders of the defined types were placed in this encounter.      SUMMARY/DISCUSSION  • I spent time: 40 minutes in direct care of this patient on this date of service. This time includes time spent by me in the following activities:preparing for the visit, obtaining and/or reviewing a separately obtained history, performing a medically appropriate examination and/or evaluation , reviewing medical records, reviewing tests, ordering medications, tests, or procedures, counseling and educating the patient/family/caregiver, documenting information in the medical record, independently interpreting results and communicating that information with the patient/family/caregiver and referring and communicating with other health care professionals .       Next Appointment with me: 8/9/2023    Return in about 3 months (around 8/2/2023) for Reassess today's  "problem(s).      VITAL SIGNS     Vitals:    05/02/23 0938   BP: 140/88   Pulse: 79   Temp: 97.5 °F (36.4 °C)   SpO2: 96%   Weight: 126 kg (278 lb)   Height: 185.4 cm (73\")       BP Readings from Last 3 Encounters:   05/02/23 140/88   04/10/23 114/64   12/13/22 140/84     Wt Readings from Last 3 Encounters:   05/02/23 126 kg (278 lb)   04/10/23 126 kg (278 lb)   12/13/22 125 kg (276 lb)     Body mass index is 36.68 kg/m².    Blood pressure readings recorded on patient flowsheet:       View : No data to display.                  MEDICATIONS AT THE TIME OF OFFICE VISIT     Current Outpatient Medications on File Prior to Visit   Medication Sig   • icosapent ethyl (VASCEPA) 1 g capsule capsule TAKE 2 CAPSULES BY MOUTH TWICE DAILY WITH MEALS   • lidocaine (LIDODERM) 5 % lidocaine 5 % topical patch   APPLY 1 PATCH TOPICALLY TO THE SKIN EVERY DAY AS NEEDED. MAY WEAR UP TO 12 HOURS   • lisinopril (PRINIVIL,ZESTRIL) 10 MG tablet TAKE 1 TABLET BY MOUTH DAILY   • metFORMIN (GLUCOPHAGE) 1000 MG tablet TAKE 1 TABLET BY MOUTH TWICE DAILY WITH MEALS   • multivitamin (THERAGRAN) tablet tablet Take 1 tablet by mouth Every Night. HOLD FOR SURGERY   • pantoprazole (PROTONIX) 40 MG EC tablet Take 1 tablet by mouth Daily.   • rosuvastatin (CRESTOR) 10 MG tablet TAKE 1 TABLET BY MOUTH EVERY NIGHT   • Rybelsus 14 MG tablet TAKE 1 TABLET BY MOUTH EVERY MORNING BEFORE BREAKFAST   • sildenafil (REVATIO) 20 MG tablet Take 2-5 tablets 1 hour before sexual activity as needed   • tiZANidine (ZANAFLEX) 4 MG tablet TAKE 1 TABLET BY MOUTH AT NIGHT AS NEEDED FOR MUSCLE SPASMS   • [DISCONTINUED] acetaminophen (TYLENOL) 500 MG tablet Take 2 tablets by mouth Every 6 (Six) Hours As Needed for Mild Pain.     No current facility-administered medications on file prior to visit.          HISTORY OF PRESENT ILLNESS     He was recently seen by DIMPLE Mcclure and was treated for possible diverticulitis although CT of the abdomen did not show evidence of " "acute diverticulitis but there was evidence of colonic diverticulosis.  Incidental finding of liver changes consistent with cirrhosis was identified.  He has had longstanding history of \"fatty liver\".  He has history of obesity, dyslipidemia and type 2 diabetes.  He is currently on Rybelsus 14 mg and metformin for type 2 diabetes.  He is on rosuvastatin for hyperlipidemia which has remained controlled.  Most recent liver labs have remained stable/normal.  He denies any fever, unusual fatigue, jaundice, generalized pruritus, abdominal distention or increased swelling of the lower extremities.  His prior colonoscopy was in 2021 showing extensive diverticulosis of the colon.      Patient Care Team:  Narinder Ortiz MD as PCP - General (Internal Medicine)    REVIEW OF SYSTEMS     Review of Systems       PHYSICAL EXAMINATION     Physical Exam  Constitutional:       Appearance: He is obese.   Eyes:      General: No scleral icterus.  Abdominal:      General: Abdomen is protuberant. There is no distension.      Palpations: There is no fluid wave, hepatomegaly, splenomegaly or mass.      Tenderness: There is no abdominal tenderness. There is no guarding.   Musculoskeletal:      Right lower leg: No edema.      Left lower leg: No edema.   Skin:     Coloration: Skin is not jaundiced.   Neurological:      Mental Status: He is alert. Mental status is at baseline.   Psychiatric:         Mood and Affect: Mood normal.         Behavior: Behavior normal.         Thought Content: Thought content normal.         Judgment: Judgment normal.             REVIEWED DATA     Labs:     Lab Results   Component Value Date     12/13/2022    K 4.1 12/13/2022    CALCIUM 9.4 12/13/2022    AST 30 12/13/2022    ALT 31 12/13/2022    BUN 11 12/13/2022    CREATININE 0.69 (L) 12/13/2022    CREATININE 0.74 (L) 06/02/2022    CREATININE 0.81 08/31/2021    EGFRIFNONA 101 08/31/2021    EGFRIFAFRI >150 05/04/2021       Lab Results   Component Value Date    " HGBA1C 5.30 12/13/2022    HGBA1C 5.8 (H) 06/02/2022    HGBA1C 5.7 (H) 08/31/2021       Lab Results   Component Value Date    LDL 39 12/13/2022    LDL 39 06/02/2022    LDL 48 01/04/2022    HDL 28 (L) 12/13/2022    HDL 23 (L) 06/02/2022    TRIG 153 (H) 12/13/2022    TRIG 284 (H) 06/02/2022       Lab Results   Component Value Date    TSH 0.429 12/27/2019    TSH 0.45 03/08/2017    TSH 0.36 (L) 06/14/2016       Lab Results   Component Value Date    WBC 8.5 06/02/2022    HGB 16.0 06/02/2022     06/02/2022       Lab Results   Component Value Date    MALBCRERATIO 5 06/02/2022            Imaging:     CT Abdomen Pelvis Without Contrast (04/14/2023 09:40)  Findings:     Lower Chest: Scarring in the lung bases     Organs: Lobular contour of the liver. Normal-sized spleen. Splenorenal varices. Kidneys, adrenal glands, pancreas have an unremarkable noncontrast appearance. Gallbladder surgically absent      Gastrointestinal: Status post sigmoid resection. Diffuse colonic diverticula with no associated inflammation. Normal appendix     Pelvis: No abnormal fluid collection. Nondistended urinary bladder     Peritoneum/Retroperitoneum: No ascites. Stable mildly enlarged upper abdominal lymph nodes portacaval node measures 3.8 x 2.3 cm. No retroperitoneal adenopathy. Normal caliber aorta     Bones/Soft Tissues: No acute bony abnormality. Status post L3-L4-L5 posterior fusion.     IMPRESSION:     1. No acute process demonstrated  2. Lobular liver contour and splenorenal varices suggests cirrhosis with portal hypertension. No ascites or splenomegaly  3. Colonic diverticulosis  4. Stable mildly enlarged upper abdominal lymph nodes are likely reactive      CT Abdomen Pelvis With Contrast (05/02/2021 11:05)  HISTORY: Chest pain. Right lower quadrant pain.     The CT scan was performed as an emergency procedure with CT angiography  protocol through the chest with intravenous contrast and 3-D  reconstructions followed by CT imaging  through the abdomen and pelvis  with intravenous contrast. The following findings are present:  1. Two contrast injections through the chest were obtained because of  initial suboptimal imaging. With a second injection there is improved  opacification of the pulmonary arteries and there is no evidence of  pulmonary embolus. The thoracic aorta shows no evidence of aneurysm or  dissection.  2. There is some minimal linear atelectasis at the right base. The lungs  are otherwise clear. There is no mediastinal or hilar or axillary  adenopathy. There is no pericardial effusion.  3. The liver, pancreas, both adrenal glands, and both kidneys appear  normal. The gallbladder is somewhat prominent in size but this is  unchanged from 02/28/2021. No gallstones are seen and there is no wall  thickening. There is slight enlargement of the spleen measuring 15.9 cm  in length and this is unchanged from 02/28/2021.  4. There is no aortic aneurysm or retroperitoneal lymphadenopathy. There  are several slightly enlarged lymph nodes along the celiac axis  measuring up to 2.3 cm in diameter. These are unchanged from the  previous exam and remain nonspecific. This combined with the slightly  enlarged spleen does raise the concern of a lymphoproliferative disorder  and further clinical correlation is therefore recommended.  5. The large and small bowel loops are normal in caliber. There is no  inflammatory change. The appendix appears normal. There is a sigmoid  anastomosis and no complicating features are seen. In the pelvis the  urinary bladder has a smooth contour.         Medical Tests:   COLONOSCOPY (01/28/2021 12:30)    - One 7 mm polyp in the rectum, removed with a hot snare. Resected and retrieved.  - Diverticulosis in the entire examined colon.      Summary of old records / correspondence / consultant report:           Request outside records:             *Examiner was wearing KN95 mask during the entire duration of the visit.  Patient was masked the entire time. Minimum social distance of 6 ft maintained entire visit except if physical contact was necessary as documented.       Template created by Jennifer Ortiz MD

## 2023-05-04 ENCOUNTER — TELEPHONE (OUTPATIENT)
Dept: INTERNAL MEDICINE | Age: 53
End: 2023-05-04
Payer: COMMERCIAL

## 2023-05-04 NOTE — TELEPHONE ENCOUNTER
"  Caller: Qasim Ch \"FLORY\"    Relationship: Self    Best call back number: [phone]    What was the call regarding: PATIENT WOULD LIKE TO DISCUSS TEST RESULTS FROM LABS ON 05/02/23.    PLEASE CALL.    Do you require a callback: YES  "

## 2023-05-04 NOTE — TELEPHONE ENCOUNTER
LDTVM advising that lab results have not returned. Pt [facility] and he would get a call with PCP dictation. Pt instructed to return call with further concerns. LENNOX

## 2023-05-09 ENCOUNTER — TELEPHONE (OUTPATIENT)
Dept: INTERNAL MEDICINE | Age: 53
End: 2023-05-09

## 2023-05-09 NOTE — TELEPHONE ENCOUNTER
"  Caller: Qasim Ch \"FLORY\"    Relationship: Self    Best call back number: [phone]    What is the best time to reach you: ANY     Who are you requesting to speak with (clinical staff, provider,  specific staff member): CLINICAL STAFF     What was the call regarding: WOULD LIKE TO DISCUSS LAB RESULTS     Do you require a callback: YES           "

## 2023-05-12 LAB
ACE SERPL-CCNC: 43 U/L (ref 14–82)
ALBUMIN SERPL-MCNC: 4.7 G/DL (ref 4–5)
ALBUMIN/CREAT UR: 7 MG/G CREAT (ref 0–29)
ALBUMIN/GLOB SERPL: 1.9 {RATIO} (ref 1.2–2.2)
ALP SERPL-CCNC: 68 IU/L (ref 44–121)
ALT SERPL-CCNC: 37 IU/L (ref 0–44)
ANA SER QL: NEGATIVE
APTT PPP: 32 SEC (ref 24–33)
AST SERPL-CCNC: 35 IU/L (ref 0–40)
BASOPHILS # BLD AUTO: 0.1 X10E3/UL (ref 0–0.2)
BASOPHILS NFR BLD AUTO: 1 %
BILIRUB SERPL-MCNC: 0.7 MG/DL (ref 0–1.2)
BUN SERPL-MCNC: 12 MG/DL (ref 6–24)
BUN/CREAT SERPL: 16 (ref 9–20)
C-ANCA TITR SER IF: NORMAL TITER
CALCIUM SERPL-MCNC: 9.8 MG/DL (ref 8.7–10.2)
CERULOPLASMIN SERPL-MCNC: 19 MG/DL (ref 16–31)
CHLORIDE SERPL-SCNC: 100 MMOL/L (ref 96–106)
CHOLEST SERPL-MCNC: 104 MG/DL (ref 100–199)
CHOLEST/HDLC SERPL: 4 RATIO (ref 0–5)
CO2 SERPL-SCNC: 24 MMOL/L (ref 20–29)
CREAT SERPL-MCNC: 0.75 MG/DL (ref 0.76–1.27)
CREAT UR-MCNC: 67.8 MG/DL
EGFRCR SERPLBLD CKD-EPI 2021: 110 ML/MIN/1.73
EOSINOPHIL # BLD AUTO: 0.1 X10E3/UL (ref 0–0.4)
EOSINOPHIL NFR BLD AUTO: 1 %
ERYTHROCYTE [DISTWIDTH] IN BLOOD BY AUTOMATED COUNT: 13 % (ref 11.6–15.4)
FERRITIN SERPL-MCNC: 138 NG/ML (ref 30–400)
GLIADIN PEPTIDE IGA SER-ACNC: 6 UNITS (ref 0–19)
GLIADIN PEPTIDE IGG SER-ACNC: 3 UNITS (ref 0–19)
GLOBULIN SER CALC-MCNC: 2.5 G/DL (ref 1.5–4.5)
GLUCOSE SERPL-MCNC: 81 MG/DL (ref 70–99)
HBA1C MFR BLD: 5.3 % (ref 4.8–5.6)
HBV CORE AB SERPL QL IA: NEGATIVE
HBV SURFACE AB SER-ACNC: <3.1 MIU/ML
HBV SURFACE AG SERPL QL IA: NEGATIVE
HCT VFR BLD AUTO: 48.7 % (ref 37.5–51)
HDLC SERPL-MCNC: 26 MG/DL
HFE GENE MUT ANL BLD/T: NORMAL
HGB BLD-MCNC: 16.8 G/DL (ref 13–17.7)
IGA SERPL-MCNC: 195 MG/DL (ref 90–386)
IMM GRANULOCYTES # BLD AUTO: 0.1 X10E3/UL (ref 0–0.1)
IMM GRANULOCYTES NFR BLD AUTO: 1 %
INR PPP: 1.1 (ref 0.9–1.2)
IRON SATN MFR SERPL: 38 % SATURATION
IRON SERPL-MCNC: 128 UG/DL
LAB DIRECTOR NAME PROVIDER: NORMAL
LDLC SERPL CALC-MCNC: 41 MG/DL (ref 0–99)
LYMPHOCYTES # BLD AUTO: 2.3 X10E3/UL (ref 0.7–3.1)
LYMPHOCYTES NFR BLD AUTO: 23 %
MCH RBC QN AUTO: 32.4 PG (ref 26.6–33)
MCHC RBC AUTO-ENTMCNC: 34.5 G/DL (ref 31.5–35.7)
MCV RBC AUTO: 94 FL (ref 79–97)
MICROALBUMIN UR-MCNC: 4.8 UG/ML
MITOCHONDRIA M2 IGG SER-ACNC: <20 UNITS (ref 0–20)
MONOCYTES # BLD AUTO: 0.9 X10E3/UL (ref 0.1–0.9)
MONOCYTES NFR BLD AUTO: 9 %
MYELOPEROXIDASE AB SER IA-ACNC: <0.2 UNITS (ref 0–0.9)
NEUTROPHILS # BLD AUTO: 6.3 X10E3/UL (ref 1.4–7)
NEUTROPHILS NFR BLD AUTO: 65 %
P-ANCA ATYPICAL TITR SER IF: NORMAL TITER
P-ANCA TITR SER IF: NORMAL TITER
PLATELET # BLD AUTO: 133 X10E3/UL (ref 150–450)
POTASSIUM SERPL-SCNC: 4.6 MMOL/L (ref 3.5–5.2)
PROT SERPL-MCNC: 7.2 G/DL (ref 6–8.5)
PROTEINASE3 AB SER IA-ACNC: <0.2 UNITS (ref 0–0.9)
PROTHROMBIN TIME: 11.2 SEC (ref 9.1–12)
RBC # BLD AUTO: 5.18 X10E6/UL (ref 4.14–5.8)
SERPINA1 GENE MUT ANL BLD/T: NORMAL
SERPINA1 GENE MUT TESTED BLD/T: NORMAL
SMA IGG SER-ACNC: 5 UNITS (ref 0–19)
SODIUM SERPL-SCNC: 139 MMOL/L (ref 134–144)
TRANSFERRIN SERPL-MCNC: 238 MG/DL
TRIGL SERPL-MCNC: 234 MG/DL (ref 0–149)
TTG IGA SER-ACNC: <2 U/ML (ref 0–3)
TTG IGG SER-ACNC: <2 U/ML (ref 0–5)
VLDLC SERPL CALC-MCNC: 37 MG/DL (ref 5–40)
WBC # BLD AUTO: 9.7 X10E3/UL (ref 3.4–10.8)

## 2023-05-15 NOTE — TELEPHONE ENCOUNTER
"  Caller: Qasim Ch \"FLORY\"    Relationship: Self    Best call back number: [phone]    What is the best time to reach you: ANY     Who are you requesting to speak with (clinical staff, provider,  specific staff member): CLINICAL STAFF     What was the call regarding: SAW TEST RESULTS IN MY CHART, WOULD LIKE TO DISCUSS WITH DR SINGH.     Do you require a callback: YES           "

## 2023-05-16 ENCOUNTER — TELEPHONE (OUTPATIENT)
Dept: INTERNAL MEDICINE | Age: 53
End: 2023-05-16
Payer: COMMERCIAL

## 2023-06-06 DIAGNOSIS — I10 PRIMARY HYPERTENSION: Primary | ICD-10-CM

## 2023-06-06 DIAGNOSIS — E78.1 PRIMARY HYPERTRIGLYCERIDEMIA: ICD-10-CM

## 2023-06-06 RX ORDER — LISINOPRIL 10 MG/1
10 TABLET ORAL DAILY
Qty: 90 TABLET | Refills: 3 | Status: SHIPPED | OUTPATIENT
Start: 2023-06-06

## 2023-06-06 RX ORDER — ROSUVASTATIN CALCIUM 10 MG/1
10 TABLET, COATED ORAL NIGHTLY
Qty: 90 TABLET | Refills: 3 | Status: SHIPPED | OUTPATIENT
Start: 2023-06-06

## 2023-07-19 ENCOUNTER — TELEPHONE (OUTPATIENT)
Dept: INTERNAL MEDICINE | Age: 53
End: 2023-07-19

## 2023-07-19 NOTE — TELEPHONE ENCOUNTER
"  Caller: Qasim Ch \"FLORY\"    Relationship: Self    Best call back number: [phone]    What was the call regarding: PATIENT STATED HE HAD A LIVER BIOPSY DONE, AND HE CANNOT GET IN CONTACT WITH THE OFFICE THAT DID THIS TO GET THE RESULTS.    HE WOULD LIKE TO KNOW IF DR SINGH CAN LOOK AT THE RESULTS AND GIVE HIM A CALL.    PLEASE CALL TO ADVISE.  "

## 2023-07-19 NOTE — TELEPHONE ENCOUNTER
Spoke with pt and informed dr fan out from the office and returning Monday     Pt contacted gi office and waiting to hear back

## 2023-07-25 DIAGNOSIS — M62.831 MUSCLE SPASM OF LEFT CALF: ICD-10-CM

## 2023-07-25 DIAGNOSIS — E78.1 HYPERTRIGLYCERIDEMIA: ICD-10-CM

## 2023-07-25 RX ORDER — ICOSAPENT ETHYL 1000 MG/1
CAPSULE ORAL
Qty: 120 CAPSULE | Refills: 11 | Status: SHIPPED | OUTPATIENT
Start: 2023-07-25

## 2023-07-25 RX ORDER — TIZANIDINE 4 MG/1
TABLET ORAL
Qty: 30 TABLET | Refills: 2 | Status: SHIPPED | OUTPATIENT
Start: 2023-07-25

## 2023-08-09 ENCOUNTER — OFFICE VISIT (OUTPATIENT)
Dept: INTERNAL MEDICINE | Age: 53
End: 2023-08-09
Payer: COMMERCIAL

## 2023-08-09 VITALS
SYSTOLIC BLOOD PRESSURE: 142 MMHG | DIASTOLIC BLOOD PRESSURE: 82 MMHG | HEIGHT: 73 IN | HEART RATE: 78 BPM | OXYGEN SATURATION: 97 % | WEIGHT: 275 LBS | TEMPERATURE: 97.3 F | BODY MASS INDEX: 36.45 KG/M2

## 2023-08-09 DIAGNOSIS — M79.672 CHRONIC PAIN OF BOTH FEET: ICD-10-CM

## 2023-08-09 DIAGNOSIS — K76.0 HEPATIC STEATOSIS: Chronic | ICD-10-CM

## 2023-08-09 DIAGNOSIS — M79.671 CHRONIC PAIN OF BOTH FEET: ICD-10-CM

## 2023-08-09 DIAGNOSIS — E78.1 HYPERTRIGLYCERIDEMIA: Chronic | ICD-10-CM

## 2023-08-09 DIAGNOSIS — G89.29 CHRONIC PAIN OF BOTH FEET: ICD-10-CM

## 2023-08-09 DIAGNOSIS — E11.9 TYPE 2 DIABETES MELLITUS WITHOUT COMPLICATION, WITHOUT LONG-TERM CURRENT USE OF INSULIN: Primary | Chronic | ICD-10-CM

## 2023-08-09 DIAGNOSIS — I10 PRIMARY HYPERTENSION: Chronic | ICD-10-CM

## 2023-08-09 NOTE — ASSESSMENT & PLAN NOTE
Lab Results   Component Value Date    HGBA1C 5.3 05/02/2023    HGBA1C 5.30 12/13/2022    HGBA1C 5.8 (H) 06/02/2022      Continue Rybelsus 14 mg. Consider switching to Ozempic or Mounjaro for additional weight loss benefit. He will let me know.

## 2023-08-09 NOTE — PROGRESS NOTES
I N T E R N A L  M E D I C I N E    J U N O H  K I M,  M D      ENCOUNTER DATE:  08/09/2023    Qasim SAUCEDO Ch / 53 y.o. / male    CHIEF COMPLAINT / REASON FOR OFFICE VISIT     Diabetes, Hypertension, Hyperlipidemia, Obesity, and Cirrhosis of liver without ascites      ASSESSMENT & PLAN     Problem List Items Addressed This Visit          High    Diabetes mellitus, type II - Primary (Chronic)    Current Assessment & Plan     Lab Results   Component Value Date    HGBA1C 5.3 05/02/2023    HGBA1C 5.30 12/13/2022    HGBA1C 5.8 (H) 06/02/2022      Continue Rybelsus 14 mg. Consider switching to Ozempic or Mounjaro for additional weight loss benefit. He will let me know.          Relevant Medications    metFORMIN (GLUCOPHAGE) 1000 MG tablet    Rybelsus 14 MG tablet       Medium    Hypertension (Chronic)    Overview     Continue lisinopril 10 mg qd.          Current Assessment & Plan     Blood pressure is little elevated today. Follow-up in 4 months.    BP Readings from Last 3 Encounters:   08/09/23 142/82   07/14/23 115/64   06/29/23 123/81             Relevant Medications    lisinopril (PRINIVIL,ZESTRIL) 10 MG tablet    Hypertriglyceridemia (Chronic)    Overview     Continue rosuvastatin and Vascepa.          Relevant Medications    rosuvastatin (CRESTOR) 10 MG tablet    Vascepa 1 g capsule capsule    Hepatic steatosis (Chronic)    Overview     7/2023: s/p liver biopsy (no cirrhosis), + steatosis with mild inflammation          Current Assessment & Plan     Reviewed liver pathology with patient. I would work on weight loss, continue to abstain from any alcohol, and avoid medications that could be a factor.     Tissue Pathology Exam (07/14/2023 10:34)             Low    Chronic pain of both feet    Relevant Orders    Ambulatory Referral to Podiatry (Completed)     Orders Placed This Encounter   Procedures    Hepatitis B Vaccine Adult IM    Ambulatory Referral to Podiatry     No orders of the defined types were  "placed in this encounter.      SUMMARY/DISCUSSION        Next Appointment with me: Visit date not found    Return in about 4 months (around 12/9/2023) for Reassess chronic medical problems.        VITAL SIGNS     Vitals:    08/09/23 1058   BP: 142/82   Pulse: 78   Temp: 97.3 øF (36.3 øC)   SpO2: 97%   Weight: 125 kg (275 lb)   Height: 185.4 cm (73\")       BP Readings from Last 3 Encounters:   08/09/23 142/82   07/14/23 115/64   06/29/23 123/81     Wt Readings from Last 3 Encounters:   08/09/23 125 kg (275 lb)   07/14/23 122 kg (270 lb)   06/29/23 122 kg (269 lb 6.4 oz)     Body mass index is 36.28 kg/mý.    Blood pressure readings recorded on patient flowsheet:       No data to display                MEDICATIONS AT THE TIME OF OFFICE VISIT     Current Outpatient Medications on File Prior to Visit   Medication Sig    lidocaine (LIDODERM) 5 % lidocaine 5 % topical patch   APPLY 1 PATCH TOPICALLY TO THE SKIN EVERY DAY AS NEEDED. MAY WEAR UP TO 12 HOURS    lisinopril (PRINIVIL,ZESTRIL) 10 MG tablet TAKE 1 TABLET BY MOUTH DAILY    metFORMIN (GLUCOPHAGE) 1000 MG tablet TAKE 1 TABLET BY MOUTH TWICE DAILY WITH MEALS    multivitamin (THERAGRAN) tablet tablet Take 1 tablet by mouth Every Night. HOLD FOR SURGERY    pantoprazole (PROTONIX) 40 MG EC tablet Take 1 tablet by mouth Daily.    rosuvastatin (CRESTOR) 10 MG tablet TAKE 1 TABLET BY MOUTH EVERY NIGHT    Rybelsus 14 MG tablet TAKE 1 TABLET BY MOUTH EVERY MORNING BEFORE BREAKFAST    sildenafil (REVATIO) 20 MG tablet Take 2-5 tablets 1 hour before sexual activity as needed    tiZANidine (ZANAFLEX) 4 MG tablet TAKE 1 TABLET BY MOUTH AT NIGHT AS NEEDED FOR MUSCLE SPASMS    Vascepa 1 g capsule capsule TAKE 2 CAPSULES BY MOUTH TWICE DAILY WITH MEALS     No current facility-administered medications on file prior to visit.         HISTORY OF PRESENT ILLNESS     Liver biopsy reviewed with patient. No cirrhosis. Nonspeciifc inflammation (mild) with mild steatosis. Most recent LFT's " "were normal. He has stopped drinking altogether.     DM 2 remains well controlled on metformin and Rybelsus 14 mg. Inquires about switching to an injectable GLP-1 agonist for additional weight loss benefit.     Chronic feet pain due to \"plantar fasciitiis\" requiest referral to podiatrist.     Blood pressure is little high today. On lisinopril 10 mg qd.     On rosuvastatin 10 mg and Vascepa for dyslipidemia.     Lab Results   Component Value Date    HGBA1C 5.3 05/02/2023    HGBA1C 5.30 12/13/2022    HGBA1C 5.8 (H) 06/02/2022    CREATININE 0.75 (L) 05/02/2023    LDL 41 05/02/2023    MALBCRERATIO 7 05/02/2023     Blood sugar readings recorded on patient's flowsheet:       No data to display               125 kg (275 lb)    REVIEW OF SYSTEMS           PHYSICAL EXAMINATION     Physical Exam  Obese   Alert with normal thought and judgment.       REVIEWED DATA     Labs:     Liver path:    The overall findings are nonspecific. The sinusoidal dilatation may be associated with an aspect of myocardial hepatopathy. The chronic inflammation consists of lymphocytes, occasional plasma cells and eosinophils; therefore, additional serology to rule out a drug reaction versus a viral or autoimmune hepatitis may be warranted. No evidence of well-defined cirrhosis is appreciated. This case is reviewed internally with Jeffery Mcbride and Tre, who concur.     Lab Results   Component Value Date    AST 35 05/02/2023    AST 30 12/13/2022    AST 37 06/02/2022      Lab Results   Component Value Date    ALT 37 05/02/2023    ALT 31 12/13/2022    ALT 35 06/02/2022      Lab Results   Component Value Date    BILITOT 0.7 05/02/2023    ALBUMIN 4.7 05/02/2023    ALKPHOS 68 05/02/2023       Lab Results   Component Value Date     05/02/2023    K 4.6 05/02/2023    CALCIUM 9.8 05/02/2023    AST 35 05/02/2023    ALT 37 05/02/2023    BUN 12 05/02/2023    CREATININE 0.75 (L) 05/02/2023    CREATININE 0.69 (L) 12/13/2022    CREATININE 0.74 (L) 06/02/2022    " EGFRIFNONA 101 08/31/2021    EGFRIFAFRI >150 05/04/2021       Lab Results   Component Value Date    HGBA1C 5.3 05/02/2023    HGBA1C 5.30 12/13/2022    HGBA1C 5.8 (H) 06/02/2022       Lab Results   Component Value Date    LDL 41 05/02/2023    LDL 39 12/13/2022    LDL 39 06/02/2022    HDL 26 (L) 05/02/2023    HDL 28 (L) 12/13/2022    TRIG 234 (H) 05/02/2023    TRIG 153 (H) 12/13/2022       Lab Results   Component Value Date    TSH 0.429 12/27/2019    TSH 0.45 03/08/2017    TSH 0.36 (L) 06/14/2016       Lab Results   Component Value Date    WBC 9.7 05/02/2023    HGB 16.8 05/02/2023     07/14/2023       Lab Results   Component Value Date    MALBCRERATIO 7 05/02/2023            Imaging:           Medical Tests:           Summary of old records / correspondence / consultant report:           Request outside records:     Transcribed from ambient dictation for Narinder Ortiz MD by Susan Andersen.  08/09/23   12:06 EDT    Patient or patient representative verbalized consent to the visit recording.  I have personally performed the services described in this document as transcribed by the above individual, and it is both accurate and complete.  Narinder Ortiz MD  8/9/2023  12:52 EDT

## 2023-08-09 NOTE — ASSESSMENT & PLAN NOTE
Reviewed liver pathology with patient. I would work on weight loss, continue to abstain from any alcohol, and avoid medications that could be a factor.     Tissue Pathology Exam (07/14/2023 10:34)

## 2023-08-09 NOTE — ASSESSMENT & PLAN NOTE
Blood pressure is little elevated today. Follow-up in 4 months.    BP Readings from Last 3 Encounters:   08/09/23 142/82   07/14/23 115/64   06/29/23 123/81

## 2023-09-18 DIAGNOSIS — E11.9 TYPE 2 DIABETES MELLITUS WITHOUT COMPLICATION, WITHOUT LONG-TERM CURRENT USE OF INSULIN: ICD-10-CM

## 2023-09-18 RX ORDER — ORAL SEMAGLUTIDE 14 MG/1
1 TABLET ORAL
Qty: 30 TABLET | Refills: 3 | Status: CANCELLED | OUTPATIENT
Start: 2023-09-18

## 2023-09-18 NOTE — TELEPHONE ENCOUNTER
Reason For Visit  CHARLOTTE IYER is an established patient here today for a chief complaint of fever up 101,congestion,cough,poor apetite,sore throat x 4days.     Patient accompanied by mother .      History of Present Illness  Presents with c/o Fever, chills, headache,feeling weak, decreased appetite, cough, nasal congestion and drainage, sore throat for 4 days. Got flu shot 8 days ago.Vomited twice 3 days ago. All the other symptoms getting better but sore throat and congestion has been worse now. Reports pain is moderate.          Review of Systems    Const: Per HPI.   ENT: Per HPI.       Allergies  No Known Drug Allergies  Seasonal Allergies    Current Meds  No Reported Medications Recorded    Active Problems  Flu vaccine need (Z23)    Past Medical History  No pertinent past medical history  History of No significant past medical history    Family History  Family history of diabetes mellitus (Z83.3)    Social History  No secondhand smoke exposure (Z78.9)    Review  Review: Past medical history, family medical history, surgical history and social history reviewed.      Vitals  Vital Signs    Recorded: 28Oct2018 01:02PM   Height 5 ft 10 in   Weight 138 lb 1.87 oz   BMI Calculated 19.82   BSA Calculated 1.78   BMI Percentile 54   2-20 Stature Percentile 90 %   2-20 Weight Percentile 77 %   Systolic 112   Diastolic 60   Temperature 98.2 F   Heart Rate 104   Respiration 18     Physical Exam  Constitutional ACW-P: well developed, well nourished, in no acute distress, interactive and current vital signs reviewed.   Head and Face ACW-P New: atraumatic, normocephalic and no skull deformity. no facial abnormalities were observed.   Eyes ACW-P New: no discharge, normal conjunctiva and no eyelid swelling.   ENT ACW-P New: normal appearing outer ear. examination of the tympanic membrane showed normal landmarks. nasal mucosa moist and pink. oral mucosa pink and moist, no oral lesions and normal appearing tongue .  The posterior  Rx attached for ozempic   Pt agree    pharynx was erythematous and had an exudate. There was 1-2+ enlargement, erythema and exudate of both tonsils.   Neck ACW-P New: normal appearing neck and supple neck.   Lymphatic ACW-P: bilateral anterior cervical node enlargement the lymph nodes were tender   Pulmonary ACW-P: no respiratory distress, normal respiratory rate and effort and no accessory muscle use. breath sounds clear to auscultation bilaterally.   Cardiovascular ACW-P: normal rate, no murmurs were heard, regular rhythm, normal S1 and normal S2.   Psychiatric ACW-P New: oriented to person, place and time. alert and awake, interactive and mood/affect were appropriate for age.      Immunizations   1    Influenza  26-Oct-2016     Assessment   1. Exudative tonsillitis (J03.90)   2. Flu-like symptoms (R68.89)    Discussion/Summary    Information discussed with Parent/Guardian   Mother requested to avoid Amoxicillin since sibling is allergic to PCN and mother allergic to keflex.        Plan   · Amoxicillin 500 MG Oral Capsule; TAKE one CAPSULE Twice daily for ten days   · Azithromycin 250 MG Oral Tablet; Take 2 tablets today, then one tablet daily for 4  days    Influenza:   Patient Education & Instructions:   Drink at least 6 glasses of water or juice a day.    Do not give your child Aspirin, as this can potentially cause a serious syndrome.    Your child is contagious and should stay home from school/ until 24 hrs after fever has resolved.    Good handwashing is one of the best ways to control the spread of germs.    You may slowly resume your normal level of activity, once you feel better.    Sit in a steamy bathroom for about 20 minutes to help with cough or congestion.  Precautions:   Call the office if: fever > 100.3 not resolved in 48-72 hours, symptoms not improving or worsening and eye redness & drainage, ear pain/pulling on ear, new fever or congestion & cough not improved after 10 days.   Go to the ER if: Cough is worsening, any  shortness of breath, rapid breaths, wheezing, difficulty breathing or chest pain and your child is refusing to drink anything for a prolonged period, severe vomiting, dehydration or decreased responsiveness.   Call 911 if: Your child is having difficulty speaking or crying due to cough/breathing, turns blue or pail, or does not respond to you.  Follow-ups & Referrals:   Follow up with PCP /urgent care/ ER immediately if symptoms get worse any time or symptoms not getting any better in 3-5 days.    Additional Notes:   Written handout given.    Patient expresses understanding of the plan.    Medical compliance with plan discussed and risks of non-compliance reviewed.    If symptoms continue or worsen after 24 hrs, seek immediate care.    Patient education completed on disease process, etiology & prognosis.    Return to clinic as clinically indicated as discussed with patient who verbalized understanding of & agreement with the plan.   Pharyngitis/Oral Infection:   Diagnosis: exudative tonsilitis .   Patient Education & Instructions:   Drink plenty of fluids and stay adequately hydrated.    For fever control, you can give your child acetaminophen (Tylenol) every 4 hrs OR ibuprofen (Motrin, Advil) every 6 hrs, as directed.    Use prescribed medication until completed.    Avoid sharing drinks & utensils.    Avoid spicy food & other irritating foods.    Cool liquids, popsicles or ice may help with symptoms relief.    For children over age six, gargle with warm salt water several times a day.    To decrease inflammation, use the 4 H's. Hydration: drink plenty of fluids. Humidity: use a humidifier at night. Honey (if over age 1): take 2-3 teaspoons a day. Heat: drink a warm beverage or broth to sooth a sore throat.    Do not give your child Aspirin, as this can potentially cause a serious syndrome.    Avoid contact or vigorous sports until cleared by your PCP.    This is contagious for 24 hr after starting medication. Do  not share drinks, utensils and saliva.    Ensure adequate rest.    This is an illness that is caused by a virus and is not treatable by antibiotic.    STREP THROAT    Reviewed symptoms of Strep- have other family members seen if even small suspicion of strep.    Advised contagious for first 24 hours of antibiotic therapy- NO SCHOOL OR WORK FOR 24 HOURS.    Important to finish entire course of antibiotics to fully treat and prevent antibiotic resistance.    Untreated strep can cause rare but serious rheumatic heart, kidney and joint problems.    Strep / antibodies can even affect the brain- would consider eval for PANDAS if dramatic change in behavior, OCD, anxiety or tics / twitches when sick.    Change tooth brush and toothpaste in a few days. Put toothbrush in  daily during treatment.    SORE THROAT    Rest more and avoid intense physical activity until you are feeling better.    Increase clear fluids, especially if you have a fever, you will need more fluids to replenish those lost due to elevated body temperature. Warm liquids can also help to alleviate throat pain.    Advise good hand washing frequently, hand , and Chlorox/Lysol disinfectant use.    May take Ibuprofen every 6 hours only if needed for pain or high fever. (take with food)    Or may alternate Tylenol with Ibuprofen every 3 hours.    -No Aspirin if under 19 years old due to the risk of Reye's Syndrome.    * Call or go to ER if drooling, increased fever, inability to swallow or open jaw, or difficulty breathing or talking. Or if stiff neck, severe headache, or if new rash or rash looking like bruise.    Options for SORE THROAT RELIEF:    Gargle with warm salt water (1 teaspoon salt in 1 cup hot water) for sore throat/ tonsil swelling.    OTC Sore Throat Lozenges OR Throat sprays as directed    Throat Coat Tea by [employer] can help to soothe sore throat (at [employer]).  Whenever on antibiotics: Recommend PROBIOTICS  supplement OTC for at least 1 MONTH (such as ULTIMATE SRAVAN or Florastor or ingredients Acidophilus/Bifidus) -Take during course of antibiotics and for the following 2 weeks to restore normal bacteria balance, prevent yeast infection and GI effects/diarrhea from antibiotic therapy.    (Best to take at separate time than antibiotic if possible).    * Some probiotics need refrigeration if they were sold that way, but many are shelf-stable now.    Or may eat YOGURT (Activia, Danactive, Yo-Plus) daily during and after antibiotic therapy (but DAIRY can worsen mucus and inflammation, so avoid dairy if you have mucus).    ------------------------------------------------------------------------------------------------------------------------------------------    .  Precautions:   Call the office if: no improvement or symptoms worsening, fever not better or is worse within 24 hours, signs of joint pain, dehydration, new rash or difficulty swallowing and any abdominal pain, yellowing of skin or severe fatigue.   Go to the ER if: you experience sever difficulty swallowing, drooling, shortness of breath or joint pain.  Follow-ups & Referrals:   Follow up with PCP /urgent care/ ER immediately if symptoms get worse any time or symptoms not getting any better in 2-3 days.    Additional Notes:   Written handout given.    Patient expresses understanding of the plan.    Medical compliance with plan discussed and risks of non-compliance reviewed.    If symptoms continue or worsen after 24 hrs, seek immediate care.    Patient education completed on disease process, etiology & prognosis.    Return to clinic as clinically indicated as discussed with patient who verbalized understanding of & agreement with the plan.   Thank you for visiting [facility]. You do not require a follow-up visit. Please see your PCP for routine care. .       To view an electronic version of your office visit summary, please access your [facility]  Patient Portal account. If you are not currently signed up and you provided us with your email address, please locate the email from \"[facility]\" and follow instructions to activate your account. Or you can visit [email or web address] to submit an online portal request form.              Signatures   Electronically signed by : CAREY Onofre; Oct 28 2018  1:06PM CST    Electronically signed by : BURTON MADRIGAL; Oct 28 2018  1:41PM CST    Electronically signed by : BURTON MADRIGAL; Oct 28 2018  1:41PM CST

## 2023-09-18 NOTE — TELEPHONE ENCOUNTER
Pt out of rybelsus 14 mg    Pt wondering if you want him to try injection  instead   Said you talked to him on previous visit . He has new insurance now and wondering if you can send it to him     Rx  for rybelsus

## 2023-09-26 ENCOUNTER — TELEPHONE (OUTPATIENT)
Dept: INTERNAL MEDICINE | Age: 53
End: 2023-09-26
Payer: COMMERCIAL

## 2023-09-26 NOTE — TELEPHONE ENCOUNTER
"    Caller: Qasim Ch \"FLORY\"    Relationship: Self    Best call back number: [phone]    Requested Prescriptions: Semaglutide, 1 MG/DOSE, (OZEMPIC) 4 MG/3ML solution pen-injector   Requested Prescriptions      No prescriptions requested or ordered in this encounter        Pharmacy where request should be sent:    [id number] - [address] [facility] [address] [facility] [address] - [phone]  [phone]   Last office visit with prescribing clinician: 8/9/2023   Last telemedicine visit with prescribing clinician: Visit date not found   Next office visit with prescribing clinician: 12/14/2023     Additional details provided by patient: PATIENT IS CALLING TO CHECK THE STATUS OF A PRIOR AUTH FOR THE ABOVE MEDICATION.  PATIENT STATES HE IS OUT OF THE OPZEMPIC AND REBELSUS.    Does the patient have less than a 3 day supply:  [x] Yes  [] No    Would you like a call back once the refill request has been completed: [] Yes [] No    If the office needs to give you a call back, can they leave a voicemail: [] Yes [] No    Radha Dailey Rep   09/26/23 15:16 EDT     PLEASE ADVISE.            "

## 2023-09-29 DIAGNOSIS — E11.9 TYPE 2 DIABETES MELLITUS WITHOUT COMPLICATION, WITHOUT LONG-TERM CURRENT USE OF INSULIN: ICD-10-CM

## 2023-09-29 RX ORDER — ORAL SEMAGLUTIDE 14 MG/1
1 TABLET ORAL
Qty: 30 TABLET | Refills: 0 | Status: SHIPPED | OUTPATIENT
Start: 2023-09-29

## 2023-09-29 NOTE — TELEPHONE ENCOUNTER
Spoke c [facility] rep who stated that yes the Rybelus and Ozempic have been denied.  Pt may try Victoza or Byetta that is covered.     PCP IS OUT OF THE OFFICE

## 2023-09-29 NOTE — TELEPHONE ENCOUNTER
"    Caller: Qasim Ch \"FLORY\"    Relationship to patient: Self    Best call back number: [phone]    Patient is needing: PATIENT CALLED WITH AN UPDATE. HE SPOKE TO HIS INSURANCE, AND THEY INFORMED HIM THAT IT IS NOT HIS DUTY TO FIND WHAT WOULD BE COVERED, THAT THERE IS A NUMBER FOR THE OFFICE TO CALL AND GET THE INFORMATION    PHONE: [phone] OPT 2 AND THEN OPT 1, THEN OPT 5 TO SPEAK WITH SOMEONE ABOUT THIS. PLEASE ADVISE.           "

## 2023-10-06 ENCOUNTER — TELEPHONE (OUTPATIENT)
Dept: INTERNAL MEDICINE | Age: 53
End: 2023-10-06

## 2023-10-06 NOTE — TELEPHONE ENCOUNTER
Pt called stating he switched insurance toward the beginning of this month. Insurance is stating that they are waiting for dr. Ortiz to fill out PA to cover the rebelysius. Pt stated new insurance is [facility] Policy ID: [id number]. Pharmacy benefits number: [id number].

## 2023-10-09 NOTE — TELEPHONE ENCOUNTER
Caller: MARY    Relationship to patient: Other    Best call back number: [phone]    Patient is needing: MARY WITH [employer] CALLED AND SAID THE APPROVAL FOR THE RYBELS SHOULD NOW BE ENTERED

## 2023-11-08 RX ORDER — PANTOPRAZOLE SODIUM 40 MG/1
40 TABLET, DELAYED RELEASE ORAL DAILY
Qty: 90 TABLET | Refills: 1 | Status: SHIPPED | OUTPATIENT
Start: 2023-11-08

## 2023-11-09 RX ORDER — PANTOPRAZOLE SODIUM 40 MG/1
40 TABLET, DELAYED RELEASE ORAL DAILY
Qty: 90 TABLET | Refills: 1 | OUTPATIENT
Start: 2023-11-09

## 2023-11-13 ENCOUNTER — TELEPHONE (OUTPATIENT)
Dept: INTERNAL MEDICINE | Age: 53
End: 2023-11-13

## 2023-11-13 NOTE — TELEPHONE ENCOUNTER
"    Caller: Qasim Ch \"FLORY\"    Relationship to patient: Self    Best call back number: [phone]    Patient is needing: PATIENT WANTED TO CHECK ON THE STATUS OF A PRIOR AUTHORIZATION FOR HIS,   Vascepa 1 g capsule capsule AND ALSO   Rybelsus 14 MG tablet     PLEASE REACH OUT AND ADVISE.     HE NOW HAS [facility] INSURANCE  MEMBER ID:  [id number]  GROUP NUMBER:[id number]    "

## 2023-11-15 ENCOUNTER — TELEPHONE (OUTPATIENT)
Dept: INTERNAL MEDICINE | Age: 53
End: 2023-11-15

## 2023-11-15 NOTE — TELEPHONE ENCOUNTER
Pt called stating he called on Monday to speak with lewis, checking on status of PA for two medications. Pt stated he has been without medications for two weeks.      Best call back number: [phone]     Patient is needing: PATIENT WANTED TO CHECK ON THE STATUS OF A PRIOR AUTHORIZATION FOR HIS,   Vascepa 1 g capsule capsule AND ALSO   Rybelsus 14 MG tablet         HE NOW HAS [facility] INSURANCE  MEMBER ID:  [id number]  GROUP NUMBER:[id number]

## 2023-11-17 DIAGNOSIS — E11.9 TYPE 2 DIABETES MELLITUS WITHOUT COMPLICATION, WITHOUT LONG-TERM CURRENT USE OF INSULIN: ICD-10-CM

## 2023-11-17 RX ORDER — ORAL SEMAGLUTIDE 14 MG/1
1 TABLET ORAL
Qty: 30 TABLET | Refills: 5 | Status: SHIPPED | OUTPATIENT
Start: 2023-11-17

## 2023-11-17 NOTE — TELEPHONE ENCOUNTER
Spoke with pt and informed both PA were approved by katerin Sherman   Member ID [id number]  Bin [id number]  Pcn [id number]  Group [id number]

## 2023-12-14 ENCOUNTER — OFFICE VISIT (OUTPATIENT)
Dept: INTERNAL MEDICINE | Age: 53
End: 2023-12-14
Payer: COMMERCIAL

## 2023-12-14 VITALS
WEIGHT: 285 LBS | HEIGHT: 73 IN | HEART RATE: 64 BPM | DIASTOLIC BLOOD PRESSURE: 88 MMHG | SYSTOLIC BLOOD PRESSURE: 140 MMHG | TEMPERATURE: 97.5 F | BODY MASS INDEX: 37.77 KG/M2 | OXYGEN SATURATION: 97 %

## 2023-12-14 DIAGNOSIS — E11.9 TYPE 2 DIABETES MELLITUS WITHOUT COMPLICATION, WITHOUT LONG-TERM CURRENT USE OF INSULIN: Primary | Chronic | ICD-10-CM

## 2023-12-14 DIAGNOSIS — Z12.5 ENCOUNTER FOR SCREENING FOR MALIGNANT NEOPLASM OF PROSTATE: ICD-10-CM

## 2023-12-14 DIAGNOSIS — I10 PRIMARY HYPERTENSION: ICD-10-CM

## 2023-12-14 DIAGNOSIS — E78.1 HYPERTRIGLYCERIDEMIA: Chronic | ICD-10-CM

## 2023-12-14 RX ORDER — AMOXICILLIN 500 MG
2400 CAPSULE ORAL 2 TIMES DAILY WITH MEALS
COMMUNITY

## 2023-12-14 RX ORDER — LISINOPRIL 20 MG/1
20 TABLET ORAL DAILY
Qty: 90 TABLET | Refills: 1 | Status: SHIPPED | OUTPATIENT
Start: 2023-12-14

## 2023-12-14 NOTE — PROGRESS NOTES
I N T E R N A L  M E D I C I N E    J U N O H  K I M,  M D      ENCOUNTER DATE:  12/14/2023    Qasim Valentinepherd / 53 y.o. / male    CHIEF COMPLAINT / REASON FOR OFFICE VISIT     Diabetes, Hyperlipidemia, Hypertension,  Hepatic steatosis, urine odor  (1 year), and Ear Fullness (Right ear)      ASSESSMENT & PLAN     Problem List Items Addressed This Visit          High    Diabetes mellitus, type II - Primary (Chronic)    Overview     Continue  Rybelsus 14 mg qd   Metformin 1000 mg BID          Current Assessment & Plan     Was off of Rybelsus for few months and weight gain noted. Just restarted about 2 weeks ago.     Check lab today.     Lab Results   Component Value Date    HGBA1C 5.3 05/02/2023    HGBA1C 5.30 12/13/2022    HGBA1C 5.8 (H) 06/02/2022    CREATININE 0.75 (L) 05/02/2023    LDL 41 05/02/2023    MALBCRERATIO 7 05/02/2023             Relevant Medications    metFORMIN (GLUCOPHAGE) 1000 MG tablet    Rybelsus 14 MG tablet    lisinopril (PRINIVIL,ZESTRIL) 20 MG tablet    Other Relevant Orders    Comprehensive Metabolic Panel    Hemoglobin A1c    Urinalysis With Culture If Indicated - Urine, Clean Catch       Medium    Hypertension (Chronic)    Current Assessment & Plan     BP Readings from Last 3 Encounters:   12/14/23 140/88   08/09/23 142/82   07/14/23 115/64      Blood pressure is higher.   Increase lisinopril to 20 mg qd.          Relevant Medications    lisinopril (PRINIVIL,ZESTRIL) 20 MG tablet    Other Relevant Orders    Comprehensive Metabolic Panel    Urinalysis With Culture If Indicated - Urine, Clean Catch    Hypertriglyceridemia (Chronic)    Overview     Continue rosuvastatin and Vascepa.          Current Assessment & Plan     Complains of cost of Vascepa therefor any OTC omega 3 supplement.     Check lab today. Consider going back on Vascepa if possible if triglycerides are quite elevated. Can look into OTC EPA omega 3.          Relevant Medications    rosuvastatin (CRESTOR) 10 MG tablet     "Vascepa 1 g capsule capsule    Other Relevant Orders    Comprehensive Metabolic Panel    Lipid Panel With / Chol / HDL Ratio     Other Visit Diagnoses       Encounter for screening for malignant neoplasm of prostate        Relevant Orders    PSA Screen          Orders Placed This Encounter   Procedures    Hepatitis A Vaccine Adult IM    Hepatitis B Vaccine Adult IM    Comprehensive Metabolic Panel    Lipid Panel With / Chol / HDL Ratio    Hemoglobin A1c    Urinalysis With Culture If Indicated - Urine, Clean Catch    PSA Screen     New Medications Ordered This Visit   Medications    lisinopril (PRINIVIL,ZESTRIL) 20 MG tablet     Sig: Take 1 tablet by mouth Daily.     Dispense:  90 tablet     Refill:  1       SUMMARY/DISCUSSION        Next Appointment with me: Visit date not found    Return in about 4 months (around 4/14/2024) for SCHEDULE COMBINED ANNUAL PHYSICAL & MEDICAL FU.        VITAL SIGNS     Vitals:    12/14/23 1008   BP: 140/88   Pulse: 64   Temp: 97.5 °F (36.4 °C)   SpO2: 97%   Weight: 129 kg (285 lb)   Height: 185.4 cm (73\")       BP Readings from Last 3 Encounters:   12/14/23 140/88   08/09/23 142/82   07/14/23 115/64     Wt Readings from Last 3 Encounters:   12/14/23 129 kg (285 lb)   08/09/23 125 kg (275 lb)   07/14/23 122 kg (270 lb)     Body mass index is 37.6 kg/m².    Blood pressure readings recorded on patient flowsheet:       No data to display                MEDICATIONS AT THE TIME OF OFFICE VISIT     Current Outpatient Medications on File Prior to Visit   Medication Sig    lidocaine (LIDODERM) 5 % lidocaine 5 % topical patch   APPLY 1 PATCH TOPICALLY TO THE SKIN EVERY DAY AS NEEDED. MAY WEAR UP TO 12 HOURS    metFORMIN (GLUCOPHAGE) 1000 MG tablet TAKE 1 TABLET BY MOUTH TWICE DAILY WITH MEALS    multivitamin (THERAGRAN) tablet tablet Take 1 tablet by mouth Every Night. HOLD FOR SURGERY    Omega-3 Fatty Acids (fish oil) 1200 MG capsule capsule Take 2 capsules by mouth 2 (Two) Times a Day With " Meals.    pantoprazole (PROTONIX) 40 MG EC tablet TAKE 1 TABLET BY MOUTH DAILY    rosuvastatin (CRESTOR) 10 MG tablet TAKE 1 TABLET BY MOUTH EVERY NIGHT    Rybelsus 14 MG tablet TAKE 1 TABLET BY MOUTH EVERY MORNING BEFORE BREAKFAST    sildenafil (REVATIO) 20 MG tablet Take 2-5 tablets 1 hour before sexual activity as needed    tiZANidine (ZANAFLEX) 4 MG tablet TAKE 1 TABLET BY MOUTH AT NIGHT AS NEEDED FOR MUSCLE SPASMS    [DISCONTINUED] lisinopril (PRINIVIL,ZESTRIL) 10 MG tablet TAKE 1 TABLET BY MOUTH DAILY    Vascepa 1 g capsule capsule TAKE 2 CAPSULES BY MOUTH TWICE DAILY WITH MEALS (Patient not taking: Reported on 12/14/2023)     No current facility-administered medications on file prior to visit.         HISTORY OF PRESENT ILLNESS     Blood pressure is elevated today at 140/88 mmHg. He does not monitor this at home. Currently taking lisinopril 10 mg daily. His weight today is 285 pounds which has increased by 15 pounds since 07/2023. He contributes this increased appetite being off of Rybelsus. His diabetes is managed on Rybelsus 14 mg daily and metformin 1000 mg twice daily. He was unable to get Rybelsus for 4 to 5 weeks due to insurance issues but was able to restart it approximately 2 weeks ago.     Hyperlipidemia is managed on rosuvastatin 10 mg daily in addition to Vascepa. He did not get Vascepa filled last month as his insurance is no longer covering it and would be too costly for him. He has been taking 2400 mg of OTC fish oil for approximately 2 weeks. Previous triglycerides were elevated at 234 mg/dL.     He reports having a strong odor to his urine for approximately 1 year. His urine is dark in color. He drinks approximately 3, 16-ounce cups of water daily in addition to some tea. Denies drinking coffee or soda. He is due for prostate cancer screening and would like this done today.     Lab Results   Component Value Date    HGBA1C 5.3 05/02/2023    HGBA1C 5.30 12/13/2022    HGBA1C 5.8 (H) 06/02/2022     CREATININE 0.75 (L) 05/02/2023    LDL 41 05/02/2023    MALBCRERATIO 7 05/02/2023     Blood sugar readings recorded on patient's flowsheet:       No data to display               129 kg (285 lb)    REVIEW OF SYSTEMS           PHYSICAL EXAMINATION     Physical Exam  General: No acute distress; obese   Psych: Normal thought and judgment   Cardiovascular Rate: normal. Rhythm: regular. Heart sounds: normal   Pulm/Chest: Effort normal, breath sounds normal.       REVIEWED DATA     Labs:       Lab Results   Component Value Date     05/02/2023    K 4.6 05/02/2023    CALCIUM 9.8 05/02/2023    AST 35 05/02/2023    ALT 37 05/02/2023    BUN 12 05/02/2023    CREATININE 0.75 (L) 05/02/2023    CREATININE 0.69 (L) 12/13/2022    CREATININE 0.74 (L) 06/02/2022    EGFRIFNONA 101 08/31/2021    EGFRIFAFRI >150 05/04/2021       Lab Results   Component Value Date    HGBA1C 5.3 05/02/2023    HGBA1C 5.30 12/13/2022    HGBA1C 5.8 (H) 06/02/2022       Lab Results   Component Value Date    LDL 41 05/02/2023    LDL 39 12/13/2022    LDL 39 06/02/2022    HDL 26 (L) 05/02/2023    HDL 28 (L) 12/13/2022    TRIG 234 (H) 05/02/2023    TRIG 153 (H) 12/13/2022       Lab Results   Component Value Date    TSH 0.429 12/27/2019    TSH 0.45 03/08/2017    TSH 0.36 (L) 06/14/2016       Lab Results   Component Value Date    WBC 9.7 05/02/2023    HGB 16.8 05/02/2023     07/14/2023       Lab Results   Component Value Date    MALBCRERATIO 7 05/02/2023        Lab Results   Component Value Date    GLUCOSEU Negative 05/02/2021    BLOODU Negative 05/02/2021    NITRITEU Negative 05/02/2021    LEUKOCYTESUR Negative 05/02/2021        Imaging:           Medical Tests:           Summary of old records / correspondence / consultant report:           Request outside records:       Transcribed from ambient dictation for Narinder Ortiz MD by Kavitha Woods.  12/14/23   11:10 EST    Patient or patient representative verbalized consent to the visit recording.  I have  personally performed the services described in this document as transcribed by the above individual, and it is both accurate and complete.  Narinder Ortiz MD  12/14/2023  16:59 EST

## 2023-12-14 NOTE — ASSESSMENT & PLAN NOTE
Was off of Rybelsus for few months and weight gain noted. Just restarted about 2 weeks ago.     Check lab today.     Lab Results   Component Value Date    HGBA1C 5.3 05/02/2023    HGBA1C 5.30 12/13/2022    HGBA1C 5.8 (H) 06/02/2022    CREATININE 0.75 (L) 05/02/2023    LDL 41 05/02/2023    MALBCRERATIO 7 05/02/2023

## 2023-12-14 NOTE — ASSESSMENT & PLAN NOTE
Complains of cost of Vascepa therefor any OTC omega 3 supplement.     Check lab today. Consider going back on Vascepa if possible if triglycerides are quite elevated. Can look into OTC EPA omega 3.

## 2023-12-14 NOTE — LETTER
[facility]  Vaccine Consent Form    Patient Name:  Qasim Ch  Patient :  1970     Vaccine(s) Ordered    Hepatitis A Vaccine Adult IM  Hepatitis B Vaccine Adult IM        Screening Checklist  The following questions should be completed prior to vaccination. If you answer “yes” to any question, it does not necessarily mean you should not be vaccinated. It just means we may need to clarify or ask more questions. If a question is unclear, please ask your healthcare provider to explain it.    Yes No   Any fever or moderate to severe illness today (mild illness and/or antibiotic treatment are not contraindications)?     Do you have a history of a serious reaction to any previous vaccinations, such as anaphylaxis, encephalopathy within 7 days, Guillain-Claunch syndrome within 6 weeks, seizure?     Have you received any live vaccine(s) (e.g MMR, FADI) or any other vaccines in the last month (to ensure duplicate doses aren't given)?     Do you have an anaphylactic allergy to latex (DTaP, DTaP-IPV, Hep A, Hep B, MenB, RV, Td, Tdap), baker’s yeast (Hep B, HPV), polysorbates (RSV, nirsevimab, PCV 20, Rotavirrus, Tdap, Shingrix), or gelatin (FADI, MMR)?     Do you have an anaphylactic allergy to neomycin (Rabies, FADI, MMR, IPV, Hep A), polymyxin B (IPV), or streptomycin (IPV)?      Any cancer, leukemia, AIDS, or other immune system disorder? (FADI, MMR, RV)     Do you have a parent, brother, or sister with an immune system problem (if immune competence of vaccine recipient clinically verified, can proceed)? (MMR, FADI)     Any recent steroid treatments for >2 weeks, chemotherapy, or radiation treatment? (FADI, MMR)     Have you received antibody-containing blood transfusions or IVIG in the past 11 months (recommended interval is dependent on product)? (MMR, FADI)     Have you taken antiviral drugs (acyclovir, famciclovir, valacyclovir for FADI) in the last 24 or 48 hours, respectively?      Are you pregnant or  planning to become pregnant within 1 month? (FADI, MMR, HPV, IPV, MenB, Abrexvy; For Hep B- refer to Engerix-B; For RSV - Abrysvo is indicated for 32-36 weeks of pregnancy from September to January)     For infants, have you ever been told your child has had intussusception or a medical emergency involving obstruction of the intestine (Rotavirus)? If not for an infant, can skip this question.         *Ordering Physician/APC should be consulted if “yes” is checked by the patient or guardian above.      I have received, read, and understand the Vaccine Information Statement (VIS) for each vaccine ordered above.  I have considered my health status as well as the health status of my close contacts.  I have taken the opportunity to discuss my vaccine questions with my health care provider.   I have requested that the ordered vaccine(s) be given to me.  I understand the benefits and risks of the vaccines.  I understand that I should remain in the clinic for 15 minutes after receiving the vaccine(s).  _________________________________________________________  Signature of Patient or Parent/Legal Guardian ____________________  Date

## 2023-12-15 LAB
ALBUMIN SERPL-MCNC: 4.7 G/DL (ref 3.5–5.2)
ALBUMIN/GLOB SERPL: 1.9 G/DL
ALP SERPL-CCNC: 61 U/L (ref 39–117)
ALT SERPL-CCNC: 36 U/L (ref 1–41)
APPEARANCE UR: CLEAR
AST SERPL-CCNC: 34 U/L (ref 1–40)
BACTERIA #/AREA URNS HPF: NORMAL /HPF
BILIRUB SERPL-MCNC: 0.9 MG/DL (ref 0–1.2)
BILIRUB UR QL STRIP: NEGATIVE
BUN SERPL-MCNC: 13 MG/DL (ref 6–20)
BUN/CREAT SERPL: 16.3 (ref 7–25)
CALCIUM SERPL-MCNC: 9.8 MG/DL (ref 8.6–10.5)
CASTS URNS QL MICRO: NORMAL /LPF
CHLORIDE SERPL-SCNC: 102 MMOL/L (ref 98–107)
CHOLEST SERPL-MCNC: 107 MG/DL (ref 0–200)
CHOLEST/HDLC SERPL: 3.82 {RATIO}
CO2 SERPL-SCNC: 26.1 MMOL/L (ref 22–29)
COLOR UR: YELLOW
CREAT SERPL-MCNC: 0.8 MG/DL (ref 0.76–1.27)
EGFRCR SERPLBLD CKD-EPI 2021: 107.1 ML/MIN/1.73
EPI CELLS #/AREA URNS HPF: NORMAL /HPF (ref 0–10)
GLOBULIN SER CALC-MCNC: 2.5 GM/DL
GLUCOSE SERPL-MCNC: 89 MG/DL (ref 65–99)
GLUCOSE UR QL STRIP: NEGATIVE
HBA1C MFR BLD: 5.4 % (ref 4.8–5.6)
HDLC SERPL-MCNC: 28 MG/DL (ref 40–60)
HGB UR QL STRIP: NEGATIVE
KETONES UR QL STRIP: NEGATIVE
LDLC SERPL CALC-MCNC: 45 MG/DL (ref 0–100)
LEUKOCYTE ESTERASE UR QL STRIP: NEGATIVE
MICRO URNS: NORMAL
MICRO URNS: NORMAL
NITRITE UR QL STRIP: NEGATIVE
PH UR STRIP: 6 [PH] (ref 5–7.5)
POTASSIUM SERPL-SCNC: 4.3 MMOL/L (ref 3.5–5.2)
PROT SERPL-MCNC: 7.2 G/DL (ref 6–8.5)
PROT UR QL STRIP: NEGATIVE
PSA SERPL-MCNC: 0.36 NG/ML (ref 0–4)
RBC #/AREA URNS HPF: NORMAL /HPF (ref 0–2)
SODIUM SERPL-SCNC: 139 MMOL/L (ref 136–145)
SP GR UR STRIP: 1.02 (ref 1–1.03)
TRIGL SERPL-MCNC: 207 MG/DL (ref 0–150)
URINALYSIS REFLEX: NORMAL
UROBILINOGEN UR STRIP-MCNC: 1 MG/DL (ref 0.2–1)
VLDLC SERPL CALC-MCNC: 34 MG/DL (ref 5–40)
WBC #/AREA URNS HPF: NORMAL /HPF (ref 0–5)

## 2023-12-15 NOTE — PROGRESS NOTES
MyChart:    Here are the result(s) of your test(s):     Labs are overall stable:    Cholesterol level is overall stable. You may stay off omega 3 for now if you want.     Kidney function, liver labs are electrolytes are stable/normal.      A1c level for average glucose level is stable    Urine is negative for significant abnormalities     PSA is stable.       Please do not hesitate to contact me if you have questions.

## 2023-12-18 DIAGNOSIS — N52.9 ERECTILE DYSFUNCTION, UNSPECIFIED ERECTILE DYSFUNCTION TYPE: ICD-10-CM

## 2023-12-18 RX ORDER — SILDENAFIL CITRATE 20 MG/1
TABLET ORAL
Qty: 20 TABLET | Refills: 5 | Status: SHIPPED | OUTPATIENT
Start: 2023-12-18

## 2023-12-26 DIAGNOSIS — M62.831 MUSCLE SPASM OF LEFT CALF: ICD-10-CM

## 2023-12-26 DIAGNOSIS — E11.9 TYPE 2 DIABETES MELLITUS WITHOUT COMPLICATION, WITHOUT LONG-TERM CURRENT USE OF INSULIN: ICD-10-CM

## 2023-12-26 RX ORDER — TIZANIDINE 4 MG/1
TABLET ORAL
Qty: 30 TABLET | Refills: 0 | Status: SHIPPED | OUTPATIENT
Start: 2023-12-26

## 2024-02-19 DIAGNOSIS — M62.831 MUSCLE SPASM OF LEFT CALF: ICD-10-CM

## 2024-02-19 RX ORDER — TIZANIDINE 4 MG/1
TABLET ORAL
Qty: 30 TABLET | Refills: 0 | Status: SHIPPED | OUTPATIENT
Start: 2024-02-19

## 2024-04-19 DIAGNOSIS — M62.831 MUSCLE SPASM OF LEFT CALF: ICD-10-CM

## 2024-04-19 RX ORDER — TIZANIDINE 4 MG/1
TABLET ORAL
Qty: 30 TABLET | Refills: 1 | Status: SHIPPED | OUTPATIENT
Start: 2024-04-19

## 2024-05-13 ENCOUNTER — TELEPHONE (OUTPATIENT)
Dept: NEUROSURGERY | Facility: OTHER | Age: 54
End: 2024-05-13
Payer: COMMERCIAL

## 2024-05-13 NOTE — TELEPHONE ENCOUNTER
PATIENT CALLED IN TO SCHEDULE A F/U EXT WITH DR NOLAN- NO RECENT IMAGING- HE STATED THIS PAST WEEKEND HE WOKE UP SATURDAY NIGHT IN TO SUNDAY AM WITH A PAIN THAT STARTS IN HIS LOW BACK AND RADIATES A STABBING PAIN DOWN HIS LEFT LEG-    HE STATED THE PAIN WAS SO SEVERE THAT HE BROKE OUT IN COLD SWEATS AND HAD ALOT OF NAUSEA ASSOCIATED-     HE WAS LAST SEEN IN 10/29/21 FOR THE SAME DX-    ANY NEW IMAGING BEFORE COMING INTO THE OFFICE?    - [phone] (home)     PLEASE ADVISE- THANK YOU!

## 2024-05-15 NOTE — PROGRESS NOTES
Subjective   History of Present Illness: Qasim Ch is a 54 y.o. male is here today for follow-up on back pain. No new imaging.       The following portions of the patient's history were reviewed and updated as appropriate: allergies, current medications, past family history, past medical history, past social history, past surgical history, and problem list.      Past Medical History:   Diagnosis Date    Bilateral leg cramps     AT     Colon polyps     FOLLOWED BY DR. SERA LE    DDD (degenerative disc disease), lumbar 2018    L3,L4,L5    Diabetes     TYPE 2, NIDDM    Disorder of gallbladder     Diverticulitis 09/2016    SIGMOID    Diverticulitis of colon 04/18/2014    Fatty liver     GERD (gastroesophageal reflux disease)     Hyperlipidemia     Hypertension     Hypertriglyceridemia     Hypogonadism 02/07/2013    Impotence of organic origin 02/22/2012    Irritable bowel syndrome 01/14/2013    Lumbar compression fracture     L4    Lumbar radiculopathy     Muscle spasm of left calf     Numbness and tingling of foot     LEFT FOOT, S/P BACK SURGERY    Obesity     WOLF (obstructive sleep apnea)     cpap    Plantar fasciitis 06/09/2016    Polycythemia 02/22/2012    Post laminectomy syndrome     Right sided abdominal pain 02/28/2021    SEEN AT [facility]    RUQ pain 05/02/2021    SEEN AT [facility]    Spinal stenosis of lumbar region without neurogenic claudication 09/05/2018    Splenomegaly 05/2021    Spondylolisthesis         Past Surgical History:   Procedure Laterality Date    ANAL FISSURECTOMY N/A 04/15/2009    DR.RAYMOND MICHAELS AT [facility]    CHOLECYSTECTOMY  2022    CHOLECYSTECTOMY WITH INTRAOPERATIVE CHOLANGIOGRAM N/A 06/23/2021    Procedure: CHOLECYSTECTOMY LAPAROSCOPIC INTRAOPERATIVE CHOLANGIOGRAM;  Surgeon: Chris Carrion Jr., MD;  Location: [facility];  Service: General;  Laterality: N/A;    COLON SURGERY N/A 09/16/2016    SIGMOID COLECTOMY, D/T DIVERTICULITIS, DR. TYLER DUNBAR AT [facility]    COLONOSCOPY N/A  04/15/2009    MIXED ADENOMATOUS POLYP IN RECTUM, DR. HERLINDA MICHAELS AT [facility]    COLONOSCOPY N/A 01/28/2021    7 MM HYPERPLASTIC POLYP IN RECTUM, MULTIPLE SMALL AND LARGE DIVERTICULA IN ENTIRE COLON, RESCOPE IN 5 YRS, DR. SERA LE AT [facility]    COLONOSCOPY W/ POLYPECTOMY N/A 12/11/2013    2 TUBULOVILLOUS ADENOMA POLYPS IN CECUM, 8 MM TUBULOVILLOUS ADENOMA POLYP IN ASCENDING, 14 MM TUBULOVILLOUS ADENOMA POLYP IN DISTAL ASCENDING, 5 MM TUBULAR ADENOMA POLYP IN ASCENDING, 5 MM BENIGN POLYP IN RECTUM, RESCOPE IN 2 YRS, DR. SERA LE AT [facility]    ENDOSCOPY AND COLONOSCOPY N/A 2014    HEMORRHOIDECTOMY N/A 04/15/2009    DR.RAYMOND MICHAELS AT [facility]    HERNIA REPAIR Left 09/14/2017    lap left inguinal hernia repair, ventral hernia repair, DR. TYLER DUNBAR AT [facility]    LUMBAR EPIDURAL INJECTION N/A     lumbar    LUMBAR SPINE POSTERIOR N/A 12/03/2018    TLIF L3-L5, DR. MOHAMMAD MAJD AT [facility]     TONSILLECTOMY Bilateral     UPPER GASTROINTESTINAL ENDOSCOPY      VENTRAL HERNIA REPAIR N/A 03/01/2018    LAPAROSCOPIC WITH PREVIOUS MESH REMOVAL, DR. TYLER DUNBAR AT [facility]          Current Outpatient Medications:     lidocaine (LIDODERM) 5 %, lidocaine 5 % topical patch  APPLY 1 PATCH TOPICALLY TO THE SKIN EVERY DAY AS NEEDED. MAY WEAR UP TO 12 HOURS, Disp: , Rfl:     lisinopril (PRINIVIL,ZESTRIL) 20 MG tablet, Take 1 tablet by mouth Daily., Disp: 90 tablet, Rfl: 1    metFORMIN (GLUCOPHAGE) 1000 MG tablet, TAKE 1 TABLET BY MOUTH TWICE DAILY WITH MEALS, Disp: 180 tablet, Rfl: 1    multivitamin (THERAGRAN) tablet tablet, Take 1 tablet by mouth Every Night. HOLD FOR SURGERY, Disp: , Rfl:     Omega-3 Fatty Acids (fish oil) 1200 MG capsule capsule, Take 2 capsules by mouth 2 (Two) Times a Day With Meals., Disp: , Rfl:     pantoprazole (PROTONIX) 40 MG EC tablet, TAKE 1 TABLET BY MOUTH DAILY, Disp: 90 tablet, Rfl: 1    rosuvastatin (CRESTOR) 10 MG tablet, TAKE 1 TABLET BY MOUTH EVERY NIGHT, Disp: 90 tablet, Rfl: 3    Rybelsus 14 MG tablet,  "TAKE 1 TABLET BY MOUTH EVERY MORNING BEFORE BREAKFAST, Disp: 30 tablet, Rfl: 5    sildenafil (REVATIO) 20 MG tablet, TAKE 2 TO 5 TABLETS BY MOUTH 1 HOUR BEFORE SEXUAL ACTIVITY AS NEEDED, Disp: 20 tablet, Rfl: 5    tiZANidine (ZANAFLEX) 4 MG tablet, TAKE 1 TABLET BY MOUTH AT NIGHT AS NEEDED FOR MUSCLE SPASMS, Disp: 30 tablet, Rfl: 1    Vascepa 1 g capsule capsule, TAKE 2 CAPSULES BY MOUTH TWICE DAILY WITH MEALS, Disp: 120 capsule, Rfl: 11    gabapentin (NEURONTIN) 300 MG capsule, Take 1 capsule by mouth 3 (Three) Times a Day., Disp: 90 capsule, Rfl: 0     Allergies   Allergen Reactions    Penicillin G Anaphylaxis        Social History     Socioeconomic History    Marital status:     Number of children: 2   Tobacco Use    Smoking status: Former     Current packs/day: 0.00     Average packs/day: 0.5 packs/day for 10.0 years (5.0 ttl pk-yrs)     Types: Cigarettes     Start date: 2002     Quit date: 2012     Years since quittin.3    Smokeless tobacco: Current     Types: Chew   Vaping Use    Vaping status: Never Used   Substance and Sexual Activity    Alcohol use: Not Currently    Drug use: Never    Sexual activity: Not Currently     Partners: Female        Family History   Problem Relation Age of Onset    No Known Problems Mother     Colon cancer Father     Malig Hyperthermia Neg Hx     Thyroid cancer Neg Hx     Liver disease Neg Hx         Review of Systems   Gastrointestinal:  Positive for nausea.   Genitourinary:  Negative for difficulty urinating and frequency.   Musculoskeletal:  Positive for back pain.        Left leg pain   Neurological:  Positive for weakness.   All other systems reviewed and are negative.      Objective     Vitals:    24 1524   BP: 152/72   Pulse: 52   Temp: 98.2 °F (36.8 °C)   SpO2: 96%   Weight: 131 kg (289 lb 6.4 oz)   Height: 185.4 cm (73\")     Body mass index is 38.18 kg/m².    Physical exam  Awake, alert, oriented x3  Pupils equal round reactive to " light  Extraocular muscles intact  Face symmetric  Speech is fluent and clear  Motor exam  Bilateral deltoids 5/5, bilateral biceps 5/5, bilateral triceps 5/5, bilateral wrist extension 5/5 bilateral hand  5/5  Bilateral hip flexion 5/5, bilateral knee extension 5/5, bilateral DF/PF 5/5  gait independent  Able to detect  light touch in all 4 extremities      Assessment & Plan   Independent Review of Radiographic Studies:      I personally reviewed the images from the following studies.    no new imaging to review    Medical Decision Makin-year-old male status post L3-4 and L4-5 TLIF in 2018 presents back to the office today with left-sided radiculopathy.  Denies any injury.  He does not take blood thinners.    Patient reports pain began on May 11 described as sharp/stabbing in his left anterior thigh that radiates down his lateral calf to his foot.  He has some associated numbness in his left second and third toe.  Imaging has not been done in 4 years and given acute symptoms we will go ahead and check an MRI of the lumbosacral spine.  We will trial gabapentin for radiculopathy and refer him back to pain management.  He has had epidurals in the past with some relief.  He will need to follow-up with  to review MRI.      Diagnoses and all orders for this visit:    1. Lumbosacral radiculopathy (Primary)  -     gabapentin (NEURONTIN) 300 MG capsule; Take 1 capsule by mouth 3 (Three) Times a Day.  Dispense: 90 capsule; Refill: 0  -     Ambulatory Referral to Pain Management  -     MRI Lumbar Spine With & Without Contrast; Future      Return in about 4 weeks (around 2024).    I spent 45 minutes caring for Qasim Ch on this date of service. This time includes time spent by me in the following activities: preparing for the visit, reviewing tests, obtaining and/or reviewing a separately obtained history, performing a medically appropriate examination and/or evaluation, counseling and  educating the patient/family/caregiver, ordering medications, tests, or procedures, referring and communicating with other health care professionals, documenting information in the medical record, independently interpreting results and communicating that information with the patient/family/caregiver, and care coordination

## 2024-05-20 DIAGNOSIS — E11.9 TYPE 2 DIABETES MELLITUS WITHOUT COMPLICATION, WITHOUT LONG-TERM CURRENT USE OF INSULIN: ICD-10-CM

## 2024-05-20 DIAGNOSIS — I10 PRIMARY HYPERTENSION: ICD-10-CM

## 2024-05-20 RX ORDER — ORAL SEMAGLUTIDE 14 MG/1
1 TABLET ORAL
Qty: 30 TABLET | Refills: 5 | Status: SHIPPED | OUTPATIENT
Start: 2024-05-20

## 2024-05-20 RX ORDER — LISINOPRIL 20 MG/1
20 TABLET ORAL DAILY
Qty: 90 TABLET | Refills: 1 | Status: SHIPPED | OUTPATIENT
Start: 2024-05-20

## 2024-05-21 ENCOUNTER — OFFICE VISIT (OUTPATIENT)
Dept: NEUROSURGERY | Facility: CLINIC | Age: 54
End: 2024-05-21
Payer: COMMERCIAL

## 2024-05-21 VITALS
OXYGEN SATURATION: 96 % | HEIGHT: 73 IN | WEIGHT: 289.4 LBS | HEART RATE: 52 BPM | DIASTOLIC BLOOD PRESSURE: 72 MMHG | TEMPERATURE: 98.2 F | BODY MASS INDEX: 38.36 KG/M2 | SYSTOLIC BLOOD PRESSURE: 152 MMHG

## 2024-05-21 DIAGNOSIS — M54.17 LUMBOSACRAL RADICULOPATHY: Primary | ICD-10-CM

## 2024-05-21 RX ORDER — GABAPENTIN 300 MG/1
300 CAPSULE ORAL 3 TIMES DAILY
Qty: 90 CAPSULE | Refills: 0 | Status: SHIPPED | OUTPATIENT
Start: 2024-05-21

## 2024-05-31 DIAGNOSIS — E78.1 PRIMARY HYPERTRIGLYCERIDEMIA: ICD-10-CM

## 2024-05-31 RX ORDER — ROSUVASTATIN CALCIUM 10 MG/1
10 TABLET, COATED ORAL NIGHTLY
Qty: 90 TABLET | Refills: 1 | OUTPATIENT
Start: 2024-05-31

## 2024-06-03 DIAGNOSIS — E78.1 PRIMARY HYPERTRIGLYCERIDEMIA: ICD-10-CM

## 2024-06-03 RX ORDER — ROSUVASTATIN CALCIUM 10 MG/1
10 TABLET, COATED ORAL NIGHTLY
Qty: 90 TABLET | Refills: 1 | OUTPATIENT
Start: 2024-06-03

## 2024-06-10 ENCOUNTER — TELEPHONE (OUTPATIENT)
Dept: NEUROSURGERY | Facility: CLINIC | Age: 54
End: 2024-06-10
Payer: COMMERCIAL

## 2024-06-11 ENCOUNTER — TELEPHONE (OUTPATIENT)
Dept: NEUROSURGERY | Facility: CLINIC | Age: 54
End: 2024-06-11
Payer: COMMERCIAL

## 2024-06-11 NOTE — TELEPHONE ENCOUNTER
LM for patient that his appt with Dr. Chapman on July 3rd has been changed to 3:00 instead of 9:30 due to a surgery that had to be added that day.

## 2024-06-18 ENCOUNTER — HOSPITAL ENCOUNTER (OUTPATIENT)
Dept: MRI IMAGING | Facility: HOSPITAL | Age: 54
Discharge: HOME | End: 2024-06-18
Admitting: NURSE PRACTITIONER
Payer: COMMERCIAL

## 2024-06-18 DIAGNOSIS — M54.17 LUMBOSACRAL RADICULOPATHY: ICD-10-CM

## 2024-06-18 DIAGNOSIS — E11.9 TYPE 2 DIABETES MELLITUS WITHOUT COMPLICATION, WITHOUT LONG-TERM CURRENT USE OF INSULIN: ICD-10-CM

## 2024-06-18 LAB
CREAT BLDA-MCNC: 0.8 MG/DL (ref 0.6–1.3)
EGFRCR SERPLBLD CKD-EPI 2021: 106.5 ML/MIN/1.73

## 2024-06-18 PROCEDURE — 72158 MRI LUMBAR SPINE W/O & W/DYE: CPT

## 2024-06-18 PROCEDURE — 82565 ASSAY OF CREATININE: CPT

## 2024-06-18 PROCEDURE — A9579 GAD-BASE MR CONTRAST NOS,1ML: HCPCS | Performed by: NURSE PRACTITIONER

## 2024-06-18 PROCEDURE — 25010000002 GADOTERIDOL PER 1 ML: Performed by: NURSE PRACTITIONER

## 2024-06-18 RX ADMIN — GADOTERIDOL 20 ML: 279.3 INJECTION, SOLUTION INTRAVENOUS at 14:00

## 2024-07-01 ENCOUNTER — TELEPHONE (OUTPATIENT)
Dept: NEUROSURGERY | Facility: CLINIC | Age: 54
End: 2024-07-01
Payer: COMMERCIAL

## 2024-07-01 NOTE — TELEPHONE ENCOUNTER
Patient has not had PT or seen pain management he would like to still come in as scheduled to discuss MRI results.

## 2024-07-02 ENCOUNTER — TELEPHONE (OUTPATIENT)
Dept: NEUROSURGERY | Facility: CLINIC | Age: 54
End: 2024-07-02
Payer: COMMERCIAL

## 2024-07-02 DIAGNOSIS — M54.17 LUMBOSACRAL RADICULOPATHY: ICD-10-CM

## 2024-07-02 NOTE — TELEPHONE ENCOUNTER
Patient was scheduled for f/up with Dr. Chapman on July 3rd however it was cancelled due to emergent surgery. Am working on rescheduling him however he is almost out of his gabapentin that Oneyda prescribed for him and is concerned about getting a refill. Please give him a call at [phone]. Thank you.

## 2024-07-03 RX ORDER — GABAPENTIN 300 MG/1
300 CAPSULE ORAL 3 TIMES DAILY
Qty: 90 CAPSULE | Refills: 0 | Status: SHIPPED | OUTPATIENT
Start: 2024-07-03

## 2024-07-23 DIAGNOSIS — E11.9 TYPE 2 DIABETES MELLITUS WITHOUT COMPLICATION, WITHOUT LONG-TERM CURRENT USE OF INSULIN: ICD-10-CM

## 2024-07-23 NOTE — PROGRESS NOTES
Subjective   History of Present Illness: Qasim Ch is a 54 y.o. male is here today for follow-up on back pain after a repeat Lumbar MRI  on 6/18/24.  He reports for several years he has had severe lower extremity pain.  The pain is worse with walking.  The pain improves with rest.  He reports even sometimes when resting he gets cramps in his lower extremities.  The left leg is affected more severely than the right.  Some of his pain seems to be generated from the SI joint.  He reports that the symptoms have worsened over the past year    History of Present Illness    The following portions of the patient's history were reviewed and updated as appropriate: allergies, current medications, past family history, past medical history, past social history, past surgical history, and problem list.    Past Medical History:   Diagnosis Date    Bilateral leg cramps     AT     Colon polyps     FOLLOWED BY DR. SERA LE    DDD (degenerative disc disease), lumbar 2018    L3,L4,L5    Diabetes     TYPE 2, NIDDM    Disorder of gallbladder     Diverticulitis 09/2016    SIGMOID    Diverticulitis of colon 04/18/2014    Fatty liver     GERD (gastroesophageal reflux disease)     Hyperlipidemia     Hypertension     Hypertriglyceridemia     Hypogonadism 02/07/2013    Impotence of organic origin 02/22/2012    Irritable bowel syndrome 01/14/2013    Lumbar compression fracture     L4    Lumbar radiculopathy     Muscle spasm of left calf     Numbness and tingling of foot     LEFT FOOT, S/P BACK SURGERY    Obesity     WOLF (obstructive sleep apnea)     cpap    Plantar fasciitis 06/09/2016    Polycythemia 02/22/2012    Post laminectomy syndrome     Right sided abdominal pain 02/28/2021    SEEN AT [facility] ER    RUQ pain 05/02/2021    SEEN AT [facility] ER    Spinal stenosis of lumbar region without neurogenic claudication 09/05/2018    Splenomegaly 05/2021    Spondylolisthesis         Past Surgical History:   Procedure Laterality Date    ANAL  FISSURECTOMY N/A 04/15/2009    DR.RAYMOND MICHAELS AT [facility]    CHOLECYSTECTOMY  2022    CHOLECYSTECTOMY WITH INTRAOPERATIVE CHOLANGIOGRAM N/A 06/23/2021    Procedure: CHOLECYSTECTOMY LAPAROSCOPIC INTRAOPERATIVE CHOLANGIOGRAM;  Surgeon: Chris Carrion Jr., MD;  Location: [facility] OR;  Service: General;  Laterality: N/A;    COLON SURGERY N/A 09/16/2016    SIGMOID COLECTOMY, D/T DIVERTICULITIS, DR. TYLER DUNBAR AT [facility]    COLONOSCOPY N/A 04/15/2009    MIXED ADENOMATOUS POLYP IN RECTUM, DR. HERLINDA MICHAELS AT [facility]    COLONOSCOPY N/A 01/28/2021    7 MM HYPERPLASTIC POLYP IN RECTUM, MULTIPLE SMALL AND LARGE DIVERTICULA IN ENTIRE COLON, RESCOPE IN 5 YRS, DR. SERA LE AT [facility]    COLONOSCOPY W/ POLYPECTOMY N/A 12/11/2013    2 TUBULOVILLOUS ADENOMA POLYPS IN CECUM, 8 MM TUBULOVILLOUS ADENOMA POLYP IN ASCENDING, 14 MM TUBULOVILLOUS ADENOMA POLYP IN DISTAL ASCENDING, 5 MM TUBULAR ADENOMA POLYP IN ASCENDING, 5 MM BENIGN POLYP IN RECTUM, RESCOPE IN 2 YRS, DR. SERA LE AT [facility]    ENDOSCOPY AND COLONOSCOPY N/A 2014    HEMORRHOIDECTOMY N/A 04/15/2009    DR.RAYMOND MICHAELS AT [facility]    HERNIA REPAIR Left 09/14/2017    lap left inguinal hernia repair, ventral hernia repair, DR. TYLER DUNBAR AT [facility]    LUMBAR EPIDURAL INJECTION N/A     lumbar    LUMBAR SPINE POSTERIOR N/A 12/03/2018    TLIF L3-L5, DR. MOHAMMAD MAJD AT [facility]     TONSILLECTOMY Bilateral     UPPER GASTROINTESTINAL ENDOSCOPY      VENTRAL HERNIA REPAIR N/A 03/01/2018    LAPAROSCOPIC WITH PREVIOUS MESH REMOVAL, DR. TYLER DUNBAR AT [facility]          Current Outpatient Medications:     gabapentin (NEURONTIN) 300 MG capsule, Take 1 capsule by mouth 3 (Three) Times a Day., Disp: 90 capsule, Rfl: 0    lidocaine (LIDODERM) 5 %, lidocaine 5 % topical patch  APPLY 1 PATCH TOPICALLY TO THE SKIN EVERY DAY AS NEEDED. MAY WEAR UP TO 12 HOURS, Disp: , Rfl:     lisinopril (PRINIVIL,ZESTRIL) 20 MG tablet, Take 1 tablet by mouth Daily., Disp: 90 tablet, Rfl: 1    metFORMIN (GLUCOPHAGE)  1000 MG tablet, TAKE 1 TABLET BY MOUTH TWICE DAILY WITH MEALS, Disp: 180 tablet, Rfl: 0    multivitamin (THERAGRAN) tablet tablet, Take 1 tablet by mouth Every Night. HOLD FOR SURGERY, Disp: , Rfl:     Omega-3 Fatty Acids (fish oil) 1200 MG capsule capsule, Take 2 capsules by mouth 2 (Two) Times a Day With Meals., Disp: , Rfl:     pantoprazole (PROTONIX) 40 MG EC tablet, TAKE 1 TABLET BY MOUTH DAILY, Disp: 90 tablet, Rfl: 1    rosuvastatin (CRESTOR) 10 MG tablet, TAKE 1 TABLET BY MOUTH EVERY NIGHT, Disp: 90 tablet, Rfl: 3    Rybelsus 14 MG tablet, TAKE 1 TABLET BY MOUTH EVERY MORNING BEFORE BREAKFAST, Disp: 30 tablet, Rfl: 5    sildenafil (REVATIO) 20 MG tablet, TAKE 2 TO 5 TABLETS BY MOUTH 1 HOUR BEFORE SEXUAL ACTIVITY AS NEEDED, Disp: 20 tablet, Rfl: 5    tiZANidine (ZANAFLEX) 4 MG tablet, TAKE 1 TABLET BY MOUTH AT NIGHT AS NEEDED FOR MUSCLE SPASMS, Disp: 30 tablet, Rfl: 1    Vascepa 1 g capsule capsule, TAKE 2 CAPSULES BY MOUTH TWICE DAILY WITH MEALS, Disp: 120 capsule, Rfl: 11     Allergies   Allergen Reactions    Penicillin G Anaphylaxis        Social History     Socioeconomic History    Marital status:     Number of children: 2   Tobacco Use    Smoking status: Former     Current packs/day: 0.00     Average packs/day: 0.5 packs/day for 10.0 years (5.0 ttl pk-yrs)     Types: Cigarettes     Start date: 2002     Quit date: 2012     Years since quittin.5    Smokeless tobacco: Current     Types: Chew   Vaping Use    Vaping status: Never Used   Substance and Sexual Activity    Alcohol use: Not Currently    Drug use: Never    Sexual activity: Not Currently     Partners: Female        Family History   Problem Relation Age of Onset    No Known Problems Mother     Colon cancer Father     Malig Hyperthermia Neg Hx     Thyroid cancer Neg Hx     Liver disease Neg Hx         Review of Systems   Genitourinary:  Negative for difficulty urinating and urgency.   Musculoskeletal:  Positive for back pain.         Cramps in bilateral legs   All other systems reviewed and are negative.      Objective     Vitals:    24 1316   BP: 142/82   Pulse: 79   Temp: 98.4 °F (36.9 °C)   SpO2: 98%   Weight: 126 kg (277 lb 6.4 oz)     Body mass index is 36.6 kg/m².      Physical Exam  Neurologic Exam  Awake, alert, oriented x3  Face symmetric  Speech is fluent and clear  No pronator drift  Motor exam  Bilateral deltoids 5/5, bilateral biceps 5/5, bilateral triceps 5/5, bilateral wrist extension 5/5 bilateral hand  5/5  Bilateral hip flexion 5/5, bilateral knee extension 5/5, bilateral DF/PF 5/5  No clonus  No Roberto's reflex  Steady normal gait  Able to detect  light touch in all 4 extremities        Assessment & Plan   Independent Review of Radiographic Studies:      I personally reviewed the images from the following studies.  MRI of the lumbar spine from 2024, CT abdomen and pelvis from 2023  The MRI images show no evidence of canal stenosis or neuroforaminal stenosis.  The previous CT images demonstrate no evidence of hardware failure which is again seen on the MRI however the hardware is more difficult to evaluate on an MRI images than CT.    Medical Decision Makin-year-old male with history of a L3-4 and L4-5 TLIF by Dr. Jauregui in 2018  -Reports that he has had left greater than right lower extremity pain since the time of the surgery.  He reports that the symptoms have worsened over the past year.  Some of the pain seems to be generated from the SI joint.  The MRI images show no evidence of severe canal or neuroforaminal stenosis.  I see no other explanation for his pain based on the MRI images.  He is scheduled to have an SI joint injection at the end of the month.  I have also given him a SI joint belt to see if this helps restrict motion and treat his pain.  If his symptoms temporarily resolved but continue to return and are localized to the SI joint we could consider an SI joint  fusion in the future if needed.  -Also ordered an NINO ultrasound to rule out vascular causes of his pain.  I have also ordered an EMG as he describes some pain in both extremities that is disabling and is not fully explained by the MRI images.  -Ordered a refill for the gabapentin because he says that this helps alleviate his symptoms.  To try to wean himself off of the gabapentin when possible if his symptoms resolved with the SI joint injection.  -If no other cause of his lower extremity pain is identified we may consider a trial for spinal cord stimulator in the future  Diagnoses and all orders for this visit:    1. Chronic left SI joint pain (Primary)  -     Doppler Arterial Multi Level Lower Extremity - Bilateral CAR; Future  -     EMG & Nerve Conduction Test; Future    2. History of lumbar spinal fusion  -     Doppler Arterial Multi Level Lower Extremity - Bilateral CAR; Future  -     EMG & Nerve Conduction Test; Future    3. Lower extremity pain, diffuse, unspecified laterality  -     Doppler Arterial Multi Level Lower Extremity - Bilateral CAR; Future  -     EMG & Nerve Conduction Test; Future    4. Lumbosacral radiculopathy  -     gabapentin (NEURONTIN) 300 MG capsule; Take 1 capsule by mouth 3 (Three) Times a Day.  Dispense: 90 capsule; Refill: 0      Return in about 4 weeks (around 8/21/2024).  I spent 40 minutes reviewing the medical record, reviewing the MRI images, discussing the management of back pain, discussing the risks and benefits of a spinal cord stimulator, discussing the risks and benefits of an SI joint fusion, discussing the causes of lower extremity pain such as vascular stenosis, diabetic neuropathy.

## 2024-07-24 ENCOUNTER — OFFICE VISIT (OUTPATIENT)
Dept: NEUROSURGERY | Facility: CLINIC | Age: 54
End: 2024-07-24
Payer: COMMERCIAL

## 2024-07-24 VITALS
TEMPERATURE: 98.4 F | WEIGHT: 277.4 LBS | BODY MASS INDEX: 36.6 KG/M2 | SYSTOLIC BLOOD PRESSURE: 142 MMHG | HEART RATE: 79 BPM | DIASTOLIC BLOOD PRESSURE: 82 MMHG | OXYGEN SATURATION: 98 %

## 2024-07-24 DIAGNOSIS — M53.3 CHRONIC LEFT SI JOINT PAIN: Primary | ICD-10-CM

## 2024-07-24 DIAGNOSIS — G89.29 CHRONIC LEFT SI JOINT PAIN: Primary | ICD-10-CM

## 2024-07-24 DIAGNOSIS — M54.17 LUMBOSACRAL RADICULOPATHY: ICD-10-CM

## 2024-07-24 DIAGNOSIS — M79.606 LOWER EXTREMITY PAIN, DIFFUSE, UNSPECIFIED LATERALITY: ICD-10-CM

## 2024-07-24 DIAGNOSIS — Z98.1 HISTORY OF LUMBAR SPINAL FUSION: ICD-10-CM

## 2024-07-24 RX ORDER — GABAPENTIN 300 MG/1
300 CAPSULE ORAL 3 TIMES DAILY
Qty: 90 CAPSULE | Refills: 0 | Status: SHIPPED | OUTPATIENT
Start: 2024-07-24

## 2024-07-25 DIAGNOSIS — E11.9 TYPE 2 DIABETES MELLITUS WITHOUT COMPLICATION, WITHOUT LONG-TERM CURRENT USE OF INSULIN: ICD-10-CM

## 2024-07-25 RX ORDER — ORAL SEMAGLUTIDE 14 MG/1
1 TABLET ORAL
Qty: 30 TABLET | Refills: 0 | Status: SHIPPED | OUTPATIENT
Start: 2024-07-25

## 2024-07-31 ENCOUNTER — TELEPHONE (OUTPATIENT)
Dept: INTERNAL MEDICINE | Age: 54
End: 2024-07-31

## 2024-07-31 NOTE — TELEPHONE ENCOUNTER
"Caller: Qasim Ch \"FLORY\"    Relationship to patient: Self    Best call back number: [phone]          Additional notes:     PATIENT HAS BEEN SCHEDULED WITH JASPREET SOSA ON 8/7/2024 FOR A PHYSICAL.  NO APPOINTMENTS WHERE AVAILABLE WITH DR SINGH.      "

## 2024-08-07 ENCOUNTER — OFFICE VISIT (OUTPATIENT)
Dept: INTERNAL MEDICINE | Age: 54
End: 2024-08-07
Payer: COMMERCIAL

## 2024-08-07 VITALS
HEART RATE: 50 BPM | HEIGHT: 73 IN | SYSTOLIC BLOOD PRESSURE: 128 MMHG | OXYGEN SATURATION: 96 % | TEMPERATURE: 97.9 F | WEIGHT: 275.8 LBS | DIASTOLIC BLOOD PRESSURE: 74 MMHG | BODY MASS INDEX: 36.55 KG/M2

## 2024-08-07 DIAGNOSIS — Z00.00 ENCOUNTER FOR ANNUAL PHYSICAL EXAM: Primary | ICD-10-CM

## 2024-08-07 DIAGNOSIS — I10 PRIMARY HYPERTENSION: Chronic | ICD-10-CM

## 2024-08-07 DIAGNOSIS — E78.1 HYPERTRIGLYCERIDEMIA: Chronic | ICD-10-CM

## 2024-08-07 DIAGNOSIS — N52.9 ERECTILE DYSFUNCTION, UNSPECIFIED ERECTILE DYSFUNCTION TYPE: Chronic | ICD-10-CM

## 2024-08-07 DIAGNOSIS — Z23 NEED FOR VACCINATION: ICD-10-CM

## 2024-08-07 DIAGNOSIS — E11.9 TYPE 2 DIABETES MELLITUS WITHOUT COMPLICATION, WITHOUT LONG-TERM CURRENT USE OF INSULIN: Chronic | ICD-10-CM

## 2024-08-07 DIAGNOSIS — K21.9 GASTROESOPHAGEAL REFLUX DISEASE WITHOUT ESOPHAGITIS: Chronic | ICD-10-CM

## 2024-08-07 DIAGNOSIS — E66.01 CLASS 2 SEVERE OBESITY DUE TO EXCESS CALORIES WITH SERIOUS COMORBIDITY AND BODY MASS INDEX (BMI) OF 36.0 TO 36.9 IN ADULT: Chronic | ICD-10-CM

## 2024-08-07 PROBLEM — M54.17 LUMBOSACRAL RADICULOPATHY: Status: ACTIVE | Noted: 2018-02-14

## 2024-08-07 PROCEDURE — 90472 IMMUNIZATION ADMIN EACH ADD: CPT | Performed by: NURSE PRACTITIONER

## 2024-08-07 PROCEDURE — 90715 TDAP VACCINE 7 YRS/> IM: CPT | Performed by: NURSE PRACTITIONER

## 2024-08-07 PROCEDURE — 99396 PREV VISIT EST AGE 40-64: CPT | Performed by: NURSE PRACTITIONER

## 2024-08-07 PROCEDURE — 90471 IMMUNIZATION ADMIN: CPT | Performed by: NURSE PRACTITIONER

## 2024-08-07 PROCEDURE — 90746 HEPB VACCINE 3 DOSE ADULT IM: CPT | Performed by: NURSE PRACTITIONER

## 2024-08-07 RX ORDER — ACETAMINOPHEN 500 MG
TABLET ORAL
COMMUNITY

## 2024-08-07 RX ORDER — PANTOPRAZOLE SODIUM 40 MG/1
1 TABLET, DELAYED RELEASE ORAL DAILY
COMMUNITY
End: 2024-08-07

## 2024-08-07 NOTE — PROGRESS NOTES
"[facility] [place] [facility]  Mitesh Rafa APRROSA M      Qasim LEWIS Ch / 54 y.o. / male  08/07/2024                        VITALS     Visit Vitals  /74 (BP Location: Left arm, Patient Position: Sitting, Cuff Size: Large Adult)   Pulse 50   Temp 97.9 °F (36.6 °C) (Temporal)   Ht 185.4 cm (73\")   Wt 125 kg (275 lb 12.8 oz)   SpO2 96%   BMI 36.39 kg/m²       BP Readings from Last 3 Encounters:   08/07/24 128/74   07/24/24 142/82   05/21/24 152/72     Wt Readings from Last 3 Encounters:   08/07/24 125 kg (275 lb 12.8 oz)   07/24/24 126 kg (277 lb 6.4 oz)   05/21/24 131 kg (289 lb 6.4 oz)      Body mass index is 36.39 kg/m².    MEDICATIONS     Current Outpatient Medications   Medication Sig Dispense Refill    acetaminophen (TYLENOL) 500 MG tablet       gabapentin (NEURONTIN) 300 MG capsule Take 1 capsule by mouth 3 (Three) Times a Day. 90 capsule 0    lidocaine (LIDODERM) 5 % lidocaine 5 % topical patch   APPLY 1 PATCH TOPICALLY TO THE SKIN EVERY DAY AS NEEDED. MAY WEAR UP TO 12 HOURS      lisinopril (PRINIVIL,ZESTRIL) 20 MG tablet Take 1 tablet by mouth Daily. 90 tablet 1    metFORMIN (GLUCOPHAGE) 1000 MG tablet TAKE 1 TABLET BY MOUTH TWICE DAILY WITH MEALS 180 tablet 0    multivitamin (THERAGRAN) tablet tablet Take 1 tablet by mouth Every Night. HOLD FOR SURGERY      pantoprazole (PROTONIX) 40 MG EC tablet TAKE 1 TABLET BY MOUTH DAILY 90 tablet 1    rosuvastatin (CRESTOR) 10 MG tablet TAKE 1 TABLET BY MOUTH EVERY NIGHT 90 tablet 3    Semaglutide (Rybelsus) 14 MG tablet Take 1 tablet by mouth Every Morning Before Breakfast. 30 tablet 0    sildenafil (REVATIO) 20 MG tablet TAKE 2 TO 5 TABLETS BY MOUTH 1 HOUR BEFORE SEXUAL ACTIVITY AS NEEDED 20 tablet 5    tiZANidine (ZANAFLEX) 4 MG tablet TAKE 1 TABLET BY MOUTH AT NIGHT AS NEEDED FOR MUSCLE SPASMS 30 tablet 1     No current facility-administered medications for this visit. "       _____________________________________________________________________________________    CHIEF COMPLAINT     Annual Exam, Diabetes, Hypertension, and Hyperlipidemia      HISTORY OF PRESENT ILLNESS      Qasim presents for annual health maintenance visit.    Last health maintenance visit: approximately 1 year ago  General health: good  Lifestyle:  Attempting to lose weight?: Yes   Diet: eats moderately healthy  Exercise: generally stays active (work/exercise)  Tobacco: Former smoker   Alcohol: occasional/infrequent  Work: Full-time  Reproductive health:  Sexually active?: Yes   Concern for STD?: No   Sexual problems?: erectile dysfunction   Sees Urologist?: Yes for ED  Depression Screenin/7/2024     9:29 AM   PHQ-2/PHQ-9 Depression Screening   Little Interest or Pleasure in Doing Things 0-->not at all   Feeling Down, Depressed or Hopeless 0-->not at all   PHQ-9: Brief Depression Severity Measure Score 0         PHQ-2: 0 (Not depressed)     PHQ-9: 0 (Negative screening for depression)    Patient Care Team:  Narinder Ortiz MD as PCP - General (Internal Medicine)  Marshall Moreno MD as Consulting Physician (Ophthalmology)  ______________________________________________________________________    ALLERGIES  Allergies   Allergen Reactions    Penicillin G Anaphylaxis        PFSH:     The following portions of the patient's history were reviewed and updated as appropriate: Allergies / Current Medications / Past Medical History / Surgical History / Social History / Family History    PROBLEM LIST   Patient Active Problem List   Diagnosis    Degeneration of intervertebral disc of lumbar region    Diabetes mellitus, type II    Hypertension    Hypertriglyceridemia    Hypogonadism    Erectile dysfunction    Irritable bowel syndrome    Intervertebral disc stenosis of neural canal    Class 2 severe obesity due to excess calories with serious comorbidity and body mass index (BMI) of 36.0 to 36.9 in adult     Obstructive sleep apnea syndrome    Polycythemia    Hepatic steatosis    Lumbosacral radiculopathy    Gastroesophageal reflux disease without esophagitis    History of colon polyps    Biliary dyskinesia    Lumbar radiculopathy    Spondylolisthesis of lumbar region    Lumbosacral spondylosis    History of arthrodesis    Complication of surgical procedure    Diverticulosis of colon    Chronic pain of both feet       PAST MEDICAL HISTORY  Past Medical History:   Diagnosis Date    Bilateral leg cramps     AT     Colon polyps     FOLLOWED BY DR. SERA LE    DDD (degenerative disc disease), lumbar 2018    L3,L4,L5    Diabetes     TYPE 2, NIDDM    Disorder of gallbladder     Diverticulitis 09/2016    SIGMOID    Diverticulitis of colon 04/18/2014    Fatty liver     GERD (gastroesophageal reflux disease)     Hyperlipidemia     Hypertension     Hypertriglyceridemia     Hypogonadism 02/07/2013    Impotence of organic origin 02/22/2012    Irritable bowel syndrome 01/14/2013    Lumbar compression fracture     L4    Lumbar radiculopathy     Muscle spasm of left calf     Numbness and tingling of foot     LEFT FOOT, S/P BACK SURGERY    Obesity     WOLF (obstructive sleep apnea)     cpap    Plantar fasciitis 06/09/2016    Polycythemia 02/22/2012    Post laminectomy syndrome     Right sided abdominal pain 02/28/2021    SEEN AT [facility] ER    RUQ pain 05/02/2021    SEEN AT [facility] ER    Spinal stenosis of lumbar region without neurogenic claudication 09/05/2018    Splenomegaly 05/2021    Spondylolisthesis        SURGICAL HISTORY  Past Surgical History:   Procedure Laterality Date    ANAL FISSURECTOMY N/A 04/15/2009    DR.RAYMOND MICHAELS AT [facility]    CHOLECYSTECTOMY  2022    CHOLECYSTECTOMY WITH INTRAOPERATIVE CHOLANGIOGRAM N/A 06/23/2021    Procedure: CHOLECYSTECTOMY LAPAROSCOPIC INTRAOPERATIVE CHOLANGIOGRAM;  Surgeon: Chris Carrion Jr., MD;  Location: [facility];  Service: General;  Laterality: N/A;    COLON SURGERY N/A 09/16/2016     SIGMOID COLECTOMY, D/T DIVERTICULITIS, DR. TYLER DUNBAR AT [facility]    COLONOSCOPY N/A 04/15/2009    MIXED ADENOMATOUS POLYP IN RECTUM, DR. HERLINDA MICHAELS AT [facility]    COLONOSCOPY N/A 2021    7 MM HYPERPLASTIC POLYP IN RECTUM, MULTIPLE SMALL AND LARGE DIVERTICULA IN ENTIRE COLON, RESCOPE IN 5 YRS, DR. SERA LE AT [facility]    COLONOSCOPY W/ POLYPECTOMY N/A 2013    2 TUBULOVILLOUS ADENOMA POLYPS IN CECUM, 8 MM TUBULOVILLOUS ADENOMA POLYP IN ASCENDING, 14 MM TUBULOVILLOUS ADENOMA POLYP IN DISTAL ASCENDING, 5 MM TUBULAR ADENOMA POLYP IN ASCENDING, 5 MM BENIGN POLYP IN RECTUM, RESCOPE IN 2 YRS, DR. SERA LE AT [facility]    ENDOSCOPY AND COLONOSCOPY N/A     HEMORRHOIDECTOMY N/A 04/15/2009    DR.RAYMOND MICHAELS AT [facility]    HERNIA REPAIR Left 2017    lap left inguinal hernia repair, ventral hernia repair, DR. TYLER DUNBAR AT [facility]    LUMBAR EPIDURAL INJECTION N/A     lumbar    LUMBAR SPINE POSTERIOR N/A 2018    TLIF L3-L5, DR. MOHAMMAD MAJD AT [facility]     TONSILLECTOMY Bilateral     UPPER GASTROINTESTINAL ENDOSCOPY      VENTRAL HERNIA REPAIR N/A 2018    LAPAROSCOPIC WITH PREVIOUS MESH REMOVAL, DR. TYLER DUNBAR AT [facility]       SOCIAL HISTORY  Social History     Socioeconomic History    Marital status:     Number of children: 2   Tobacco Use    Smoking status: Former     Current packs/day: 0.00     Average packs/day: 0.5 packs/day for 10.0 years (5.0 ttl pk-yrs)     Types: Cigarettes     Start date: 2002     Quit date: 2012     Years since quittin.6    Smokeless tobacco: Current     Types: Chew   Vaping Use    Vaping status: Never Used   Substance and Sexual Activity    Alcohol use: Not Currently    Drug use: Never    Sexual activity: Not Currently     Partners: Female       FAMILY HISTORY  Family History   Problem Relation Age of Onset    No Known Problems Mother     Colon cancer Father     Malig Hyperthermia Neg Hx     Thyroid cancer Neg Hx     Liver disease Neg Hx         IMMUNIZATION HISTORY  Immunization History   Administered Date(s) Administered    Flu Vaccine Intradermal Quad 18-64YR 10/28/2021    Fluzone (or Fluarix & Flulaval for VFC) >6mos 01/29/2018    Fluzone Quad >6mos (Multi-dose) 11/09/2019    Hepatitis A 05/02/2023, 12/14/2023    Hepatitis B Adult/Adolescent IM 08/09/2023, 12/14/2023    Influenza, Unspecified 11/09/2018    Pneumococcal Conjugate 20-Valent (PCV20) 06/02/2022       ______________________________________________________________________    REVIEW OF SYSTEMS     Review of Systems   Constitutional: Negative.    HENT: Negative.     Eyes: Negative.    Respiratory: Negative.     Cardiovascular: Negative.    Gastrointestinal: Negative.    Endocrine: Negative.    Genitourinary: Negative.    Musculoskeletal:  Positive for back pain.   Skin: Negative.    Allergic/Immunologic: Negative.    Neurological: Negative.    Hematological: Negative.    Psychiatric/Behavioral: Negative.       PHYSICAL EXAMINATION     Physical Exam  Constitutional:       Appearance: Normal appearance. He is obese.   HENT:      Head: Normocephalic and atraumatic.      Right Ear: Tympanic membrane normal.      Left Ear: Tympanic membrane normal.      Nose: Nose normal. No congestion.      Mouth/Throat:      Mouth: Mucous membranes are moist.      Pharynx: Oropharynx is clear.   Eyes:      Conjunctiva/sclera: Conjunctivae normal.      Pupils: Pupils are equal, round, and reactive to light.   Neck:      Vascular: No carotid bruit.   Cardiovascular:      Rate and Rhythm: Normal rate and regular rhythm.      Pulses: Normal pulses.      Heart sounds: Normal heart sounds.   Pulmonary:      Effort: Pulmonary effort is normal.      Breath sounds: Normal breath sounds.   Abdominal:      General: There is no distension.      Palpations: Abdomen is soft.      Tenderness: There is no abdominal tenderness. There is no right CVA tenderness, left CVA tenderness or guarding.   Genitourinary:     Comments:  "Followed by urology  Musculoskeletal:         General: Normal range of motion.      Cervical back: Normal range of motion.      Right lower leg: No edema.      Left lower leg: No edema.   Lymphadenopathy:      Cervical: No cervical adenopathy.   Skin:     General: Skin is warm and dry.   Neurological:      Mental Status: He is alert and oriented to person, place, and time.      Cranial Nerves: No cranial nerve deficit.      Motor: No weakness.      Gait: Gait normal.      Deep Tendon Reflexes:      Reflex Scores:       Patellar reflexes are 2+ on the right side and 2+ on the left side.  Psychiatric:         Mood and Affect: Mood normal.         Behavior: Behavior normal.         Thought Content: Thought content normal.         Judgment: Judgment normal.       REVIEWED DATA      Labs:    Lab Results   Component Value Date     12/14/2023    K 4.3 12/14/2023    CALCIUM 9.8 12/14/2023    AST 34 12/14/2023    ALT 36 12/14/2023    BUN 13 12/14/2023    CREATININE 0.80 06/18/2024    CREATININE 0.80 12/14/2023    CREATININE 0.75 (L) 05/02/2023    EGFRIFNONA 101 08/31/2021    EGFRIFAFRI >150 05/04/2021       Lab Results   Component Value Date    HGBA1C 5.40 12/14/2023    HGBA1C 5.3 05/02/2023    HGBA1C 5.30 12/13/2022    TSH 0.429 12/27/2019       Lab Results   Component Value Date    PSA 0.356 12/14/2023    PSA 0.429 12/13/2022    PSA 0.492 12/27/2019       Lab Results   Component Value Date    LDL 45 12/14/2023    HDL 28 (L) 12/14/2023    TRIG 207 (H) 12/14/2023    CHOLHDLRATIO 3.82 12/14/2023       No components found for: \"[id number]\"    Lab Results   Component Value Date    WBC 9.7 05/02/2023    HGB 16.8 05/02/2023    MCV 94 05/02/2023     07/14/2023       Lab Results   Component Value Date    PROTEIN Negative 12/14/2023    GLUCOSEU Negative 12/14/2023    BLOODU Negative 12/14/2023    NITRITEU Negative 12/14/2023    LEUKOCYTESUR Negative 12/14/2023          Lab Results   Component Value Date    HEPCVIRUSABY " <0.1 12/13/2022       Imaging:           Medical Tests:       ______________________________________________________________________    ASSESSMENT & PLAN     ANNUAL WELLNESS EXAM / PHYSICAL     Other medical problems addressed today:    Patient was last seen by primary care provider Dr. Ortiz December 14, 2023.  At that time discussion was had with type 2 diabetes, hypertension, hyperlipidemia.    Type 2 diabetes mellitus on Rybelsus 14 mg metformin 1000 mg twice daily.  He has lost 10 pounds since December going back on Rybelsus.  Last hemoglobin A1c 5.4.  He is on ACE inhibitor.    Hypertriglyceridemia, on rosuvastatin 10 mg was on Vascepa but due to cost was switched to omega-3.  Triglycerides improved to 207 and he was allowed to come off omega-3.    Hypertension stable, lisinopril was increased to 20 mg at last office visit.  Blood pressure today now 128/74.    PSA in December, no velocity increase.  Last level 0.356.    Patient was seen by Dr. Chapman in July 24 for chronic left SI joint pain, lumbar spinal fusion, lower extremity pain, lumbosacral radiculopathy.  An EMG test was ordered at that time along with the arterial Doppler.  Provided gabapentin 300 mg 3 times daily as needed.  Scheduled for vascular study August 13 and follow-up with Dr. Chapman on September 6.    Colonoscopy in January 2021.  Performed by Dr. Meier.  Recall in 5 years was recommended.    On pantoprazole for GERD no trouble swallowing or dysphagia.  Kidney function stable.  Takes only as needed.    Erectile dysfunction controlled with sildenafil as needed.  No episodes of hypotension.    Former smoker quit in 2012.  Approximately 5 pack years.    Summary/Discussion:     Patient states that he has had a diabetic foot exam within the last 12 months but does not remember his provider.  He will verify at home.  Request diabetic eye exam report  Annual fasting labs today with continued current treatment for type 2 diabetes,  hypertriglyceridemia, hypertension  Colonoscopy recall in 2026  He will complete hepatitis B vaccine series today, they are also like to obtain Tdap.    Next Appointment with me: Visit date not found    Return in about 6 months (around 2/7/2025) for Next scheduled follow up; 1 YEAR ANNUAL PHYSICAL .      HEALTHCARE MAINTENANCE ISSUES       Cancer Screening:  Colon: Initial/Next screening at age: CURRENT  Repeat colon cancer screening: every 5 years  Prostate: PSA checked annually but defer STACEY followed by urology  Testicular: Recommended monthly self exam  Skin: Monthly self skin examination, annual exam by health professional  Lung: Does not meet criteria for lung cancer screening.   Other:    Screening Labs & Tests:  Lab results reviewed & discussed with with patient or orders placed today.  EKG:  CV Screening: Lipid panel  DEXA (75+ or risk factors):   HEP C (If born [id number] or risk factors): Negative screen  Other:     Immunization/Vaccinations (to be given today unless deferred by patient)  Influenza: Recommended annual influenza vaccine  Hepatitis A: Up to date  Tetanus/Pertussis: Administer today  Pneumovax: Up to date  Shingles: Recommended Shingrix at pharmacy  Other: Third hepatitis B vaccine administered today    Lifestyle Counseling:  Lifestyle Modifications: Improve dietary compliance and Increase intensity/regularity of aerobic exercise  Safety Issues: Always wear seatbelt, Avoid texting while driving   Use sunscreen, regular skin examination  Recommended annual dental/vision examination.  Emotional/Stress/Sleep: Reviewed and  given when appropriate      Health Maintenance   Topic Date Due    TDAP/TD VACCINES (1 - Tdap) Never done    ZOSTER VACCINE (1 of 2) Never done    DIABETIC FOOT EXAM  12/13/2023    DIABETIC EYE EXAM  01/25/2024    Hepatitis B (3 of 3 - 19+ 3-dose series) 02/09/2024    URINE MICROALBUMIN  05/02/2024    HEMOGLOBIN A1C  06/14/2024    INFLUENZA VACCINE  08/01/2024     COVID-19 Vaccine (1 - 2023-24 season) 08/09/2024 (Originally 9/1/2023)    PROSTATE CANCER SCREENING  12/14/2024    LIPID PANEL  12/14/2024    BMI FOLLOWUP  07/25/2025    ANNUAL PHYSICAL  08/07/2025    COLORECTAL CANCER SCREENING  01/28/2026    HEPATITIS C SCREENING  Completed    Pneumococcal Vaccine 0-64  Completed     Dragon Dictation used for documentation*

## 2024-08-07 NOTE — LETTER
[facility]  Vaccine Consent Form    Patient Name:  Qasim Ch  Patient :  1970     Vaccine(s) Ordered    Hepatitis B Vaccine Adult IM (ENGERIX/RECOMBIVAX)  Tdap Vaccine => 8yo IM (BOOSTRIX/ADACEL)        Screening Checklist  The following questions should be completed prior to vaccination. If you answer “yes” to any question, it does not necessarily mean you should not be vaccinated. It just means we may need to clarify or ask more questions. If a question is unclear, please ask your healthcare provider to explain it.    Yes No   Any fever or moderate to severe illness today (mild illness and/or antibiotic treatment are not contraindications)?     Do you have a history of a serious reaction to any previous vaccinations, such as anaphylaxis, encephalopathy within 7 days, Guillain-Mendocino syndrome within 6 weeks, seizure?     Have you received any live vaccine(s) (e.g MMR, FADI) or any other vaccines in the last month (to ensure duplicate doses aren't given)?     Do you have an anaphylactic allergy to latex (DTaP, DTaP-IPV, Hep A, Hep B, MenB, RV, Td, Tdap), baker’s yeast (Hep B, HPV), polysorbates (RSV, nirsevimab, PCV 20, Rotavirrus, Tdap, Shingrix), or gelatin (FADI, MMR)?     Do you have an anaphylactic allergy to neomycin (Rabies, FADI, MMR, IPV, Hep A), polymyxin B (IPV), or streptomycin (IPV)?      Any cancer, leukemia, AIDS, or other immune system disorder? (FADI, MMR, RV)     Do you have a parent, brother, or sister with an immune system problem (if immune competence of vaccine recipient clinically verified, can proceed)? (MMR, FADI)     Any recent steroid treatments for >2 weeks, chemotherapy, or radiation treatment? (FADI, MMR)     Have you received antibody-containing blood transfusions or IVIG in the past 11 months (recommended interval is dependent on product)? (MMR, FADI)     Have you taken antiviral drugs (acyclovir, famciclovir, valacyclovir for FADI) in the last 24 or 48 hours,  "respectively?      Are you pregnant or planning to become pregnant within 1 month? (FADI, MMR, HPV, IPV, MenB, Abrexvy; For Hep B- refer to Engerix-B; For RSV - Abrysvo is indicated for 32-36 weeks of pregnancy from September to January)     For infants, have you ever been told your child has had intussusception or a medical emergency involving obstruction of the intestine (Rotavirus)? If not for an infant, can skip this question.         *Ordering Physicians/APC should be consulted if \"yes\" is checked by the patient or guardian above.  I have received, read, and understand the Vaccine Information Statement (VIS) for each vaccine ordered.  I have considered my or my child's health status as well as the health status of my close contacts.  I have taken the opportunity to discuss my vaccine questions with my or my child's health care provider.   I have requested that the ordered vaccine(s) be given to me or my child.  I understand the benefits and risks of the vaccines.  I understand that I should remain in the clinic for 15 minutes after receiving the vaccine(s).  _________________________________________________________  Signature of Patient or Parent/Legal Guardian ____________________  Date     "

## 2024-08-08 DIAGNOSIS — E78.1 PRIMARY HYPERTRIGLYCERIDEMIA: ICD-10-CM

## 2024-08-08 LAB
ALBUMIN SERPL-MCNC: 4.5 G/DL (ref 3.8–4.9)
ALBUMIN/CREAT UR: 7 MG/G CREAT (ref 0–29)
ALP SERPL-CCNC: 72 IU/L (ref 44–121)
ALT SERPL-CCNC: 29 IU/L (ref 0–44)
AST SERPL-CCNC: 26 IU/L (ref 0–40)
BASOPHILS # BLD AUTO: 0.1 X10E3/UL (ref 0–0.2)
BASOPHILS NFR BLD AUTO: 1 %
BILIRUB SERPL-MCNC: 0.6 MG/DL (ref 0–1.2)
BUN SERPL-MCNC: 16 MG/DL (ref 6–24)
BUN/CREAT SERPL: 21 (ref 9–20)
CALCIUM SERPL-MCNC: 10 MG/DL (ref 8.7–10.2)
CHLORIDE SERPL-SCNC: 99 MMOL/L (ref 96–106)
CHOLEST SERPL-MCNC: 158 MG/DL (ref 100–199)
CHOLEST/HDLC SERPL: 4.3 RATIO (ref 0–5)
CO2 SERPL-SCNC: 25 MMOL/L (ref 20–29)
CREAT SERPL-MCNC: 0.78 MG/DL (ref 0.76–1.27)
CREAT UR-MCNC: 108.6 MG/DL
EGFRCR SERPLBLD CKD-EPI 2021: 107 ML/MIN/1.73
EOSINOPHIL # BLD AUTO: 0.1 X10E3/UL (ref 0–0.4)
EOSINOPHIL NFR BLD AUTO: 1 %
ERYTHROCYTE [DISTWIDTH] IN BLOOD BY AUTOMATED COUNT: 12.6 % (ref 11.6–15.4)
GLOBULIN SER CALC-MCNC: 2.7 G/DL (ref 1.5–4.5)
GLUCOSE SERPL-MCNC: 82 MG/DL (ref 70–99)
HBA1C MFR BLD: 5.2 % (ref 4.8–5.6)
HCT VFR BLD AUTO: 50.2 % (ref 37.5–51)
HDLC SERPL-MCNC: 37 MG/DL
HGB BLD-MCNC: 17.1 G/DL (ref 13–17.7)
IMM GRANULOCYTES # BLD AUTO: 0 X10E3/UL (ref 0–0.1)
IMM GRANULOCYTES NFR BLD AUTO: 0 %
LDLC SERPL CALC-MCNC: 85 MG/DL (ref 0–99)
LYMPHOCYTES # BLD AUTO: 2.3 X10E3/UL (ref 0.7–3.1)
LYMPHOCYTES NFR BLD AUTO: 28 %
MCH RBC QN AUTO: 32.5 PG (ref 26.6–33)
MCHC RBC AUTO-ENTMCNC: 34.1 G/DL (ref 31.5–35.7)
MCV RBC AUTO: 95 FL (ref 79–97)
MICROALBUMIN UR-MCNC: 8.1 UG/ML
MONOCYTES # BLD AUTO: 0.9 X10E3/UL (ref 0.1–0.9)
MONOCYTES NFR BLD AUTO: 11 %
NEUTROPHILS # BLD AUTO: 4.8 X10E3/UL (ref 1.4–7)
NEUTROPHILS NFR BLD AUTO: 59 %
PLATELET # BLD AUTO: 155 X10E3/UL (ref 150–450)
POTASSIUM SERPL-SCNC: 4.6 MMOL/L (ref 3.5–5.2)
PROT SERPL-MCNC: 7.2 G/DL (ref 6–8.5)
RBC # BLD AUTO: 5.26 X10E6/UL (ref 4.14–5.8)
SODIUM SERPL-SCNC: 137 MMOL/L (ref 134–144)
T4 FREE SERPL-MCNC: 1.24 NG/DL (ref 0.82–1.77)
TRIGL SERPL-MCNC: 214 MG/DL (ref 0–149)
TSH SERPL DL<=0.005 MIU/L-ACNC: 0.57 UIU/ML (ref 0.45–4.5)
VLDLC SERPL CALC-MCNC: 36 MG/DL (ref 5–40)
WBC # BLD AUTO: 8.1 X10E3/UL (ref 3.4–10.8)

## 2024-08-08 RX ORDER — ROSUVASTATIN CALCIUM 10 MG/1
10 TABLET, COATED ORAL NIGHTLY
Qty: 90 TABLET | Refills: 1 | Status: SHIPPED | OUTPATIENT
Start: 2024-08-08

## 2024-08-13 ENCOUNTER — HOSPITAL ENCOUNTER (OUTPATIENT)
Dept: CARDIOLOGY | Facility: HOSPITAL | Age: 54
Discharge: HOME | End: 2024-08-13
Admitting: NEUROLOGICAL SURGERY
Payer: COMMERCIAL

## 2024-08-13 DIAGNOSIS — Z98.1 HISTORY OF LUMBAR SPINAL FUSION: ICD-10-CM

## 2024-08-13 DIAGNOSIS — M79.606 LOWER EXTREMITY PAIN, DIFFUSE, UNSPECIFIED LATERALITY: ICD-10-CM

## 2024-08-13 DIAGNOSIS — M53.3 CHRONIC LEFT SI JOINT PAIN: ICD-10-CM

## 2024-08-13 DIAGNOSIS — G89.29 CHRONIC LEFT SI JOINT PAIN: ICD-10-CM

## 2024-08-13 LAB
BH CV LOWER ARTERIAL LEFT ABI RATIO: 1.15
BH CV LOWER ARTERIAL LEFT DORSALIS PEDIS SYS MAX: 142
BH CV LOWER ARTERIAL LEFT GREAT TOE SYS MAX: 122
BH CV LOWER ARTERIAL LEFT POST TIBIAL SYS MAX: 152
BH CV LOWER ARTERIAL LEFT TBI RATIO: 0.92
BH CV LOWER ARTERIAL RIGHT ABI RATIO: 1.19
BH CV LOWER ARTERIAL RIGHT DORSALIS PEDIS SYS MAX: 149
BH CV LOWER ARTERIAL RIGHT GREAT TOE SYS MAX: 137
BH CV LOWER ARTERIAL RIGHT POST TIBIAL SYS MAX: 157
BH CV LOWER ARTERIAL RIGHT TBI RATIO: 1.04
UPPER ARTERIAL LEFT ARM BRACHIAL SYS MAX: 132
UPPER ARTERIAL RIGHT ARM BRACHIAL SYS MAX: 128

## 2024-08-13 PROCEDURE — 93922 UPR/L XTREMITY ART 2 LEVELS: CPT | Performed by: STUDENT IN AN ORGANIZED HEALTH CARE EDUCATION/TRAINING PROGRAM

## 2024-08-13 PROCEDURE — 93922 UPR/L XTREMITY ART 2 LEVELS: CPT

## 2024-08-16 ENCOUNTER — TELEPHONE (OUTPATIENT)
Dept: INTERNAL MEDICINE | Age: 54
End: 2024-08-16
Payer: COMMERCIAL

## 2024-08-16 NOTE — TELEPHONE ENCOUNTER
----- Message from Mitesh Craig sent at 8/7/2024 10:12 AM EDT -----  Phone: [phone]    Can you please obtain diabetic eye exam report?

## 2024-08-16 NOTE — TELEPHONE ENCOUNTER
Spoke with eye    He was seen last 1/2023 . He scheduled for a future kendall. They will send us report after he get seen this year

## 2024-08-29 ENCOUNTER — TELEPHONE (OUTPATIENT)
Dept: NEUROSURGERY | Facility: CLINIC | Age: 54
End: 2024-08-29
Payer: COMMERCIAL

## 2024-08-29 NOTE — TELEPHONE ENCOUNTER
I called patient left vm. Patient is scheduled to follow up with Dr. Chapman next week. He was referred to Dr. Polo for EMG/NCS. Dr Polo's office has him down for a no show appointment that was scheduled for 8/22/24.     Patient can contact Dr. Polo's office to r/s [phone]. He will need to rescheduled appointment with Dr. Chapman if unable to have it done prior to current scheduled follow up. If he has had EMG elsewhere we will need to know when and location.

## 2024-09-03 ENCOUNTER — TELEPHONE (OUTPATIENT)
Dept: NEUROSURGERY | Facility: CLINIC | Age: 54
End: 2024-09-03
Payer: COMMERCIAL

## 2024-09-03 ENCOUNTER — OFFICE VISIT (OUTPATIENT)
Dept: INTERNAL MEDICINE | Age: 54
End: 2024-09-03
Payer: COMMERCIAL

## 2024-09-03 VITALS
BODY MASS INDEX: 37.96 KG/M2 | WEIGHT: 286.4 LBS | SYSTOLIC BLOOD PRESSURE: 152 MMHG | HEART RATE: 70 BPM | DIASTOLIC BLOOD PRESSURE: 82 MMHG | TEMPERATURE: 98.1 F | HEIGHT: 73 IN | OXYGEN SATURATION: 97 %

## 2024-09-03 DIAGNOSIS — K21.9 GASTROESOPHAGEAL REFLUX DISEASE, UNSPECIFIED WHETHER ESOPHAGITIS PRESENT: ICD-10-CM

## 2024-09-03 DIAGNOSIS — I49.3 PVC'S (PREMATURE VENTRICULAR CONTRACTIONS): ICD-10-CM

## 2024-09-03 DIAGNOSIS — R07.89 ATYPICAL CHEST PAIN: ICD-10-CM

## 2024-09-03 DIAGNOSIS — M54.6 THORACIC BACK PAIN, UNSPECIFIED BACK PAIN LATERALITY, UNSPECIFIED CHRONICITY: ICD-10-CM

## 2024-09-03 DIAGNOSIS — G47.33 OSA (OBSTRUCTIVE SLEEP APNEA): ICD-10-CM

## 2024-09-03 DIAGNOSIS — R61 NIGHT SWEATS: Primary | ICD-10-CM

## 2024-09-03 PROCEDURE — 93000 ELECTROCARDIOGRAM COMPLETE: CPT | Performed by: NURSE PRACTITIONER

## 2024-09-03 PROCEDURE — 99214 OFFICE O/P EST MOD 30 MIN: CPT | Performed by: NURSE PRACTITIONER

## 2024-09-03 RX ORDER — NEBIVOLOL 5 MG/1
5 TABLET ORAL DAILY
Qty: 90 TABLET | Refills: 0 | Status: SHIPPED | OUTPATIENT
Start: 2024-09-03

## 2024-09-03 RX ORDER — NEBIVOLOL 5 MG/1
5 TABLET ORAL DAILY
Qty: 30 TABLET | Refills: 1 | Status: SHIPPED | OUTPATIENT
Start: 2024-09-03 | End: 2024-09-03

## 2024-09-03 RX ORDER — ALBUTEROL SULFATE 90 UG/1
AEROSOL, METERED RESPIRATORY (INHALATION)
COMMUNITY

## 2024-09-03 NOTE — PROGRESS NOTES
I N T E R N A L  M E D I C I N E  JASPREET SOSA, APRN      ENCOUNTER DATE:  09/03/2024    Qasim Ch / 54 y.o. / male      CHIEF COMPLAINT / REASON FOR OFFICE VISIT     Insomnia (TROUBLE SLEEPING FOR 2 WEEKS ), Headache (FEELS PRESSURE IN HIS EARS, HEAD, NECK ), Heartburn, and Night Sweats      ASSESSMENT & PLAN     Problem List Items Addressed This Visit    None  Visit Diagnoses       Night sweats    -  Primary    Relevant Orders    ECG 12 Lead    TSH+Free T4    Comprehensive Metabolic Panel    CBC w AUTO Differential    T3, free    Thyroid Peroxidase Antibody    Thyroid Stimulating Immunoglobulin    Adult Stress Echo W/ Cont or Stress Agent if Necessary Per Protocol    Thoracic back pain, unspecified back pain laterality, unspecified chronicity        Relevant Orders    ECG 12 Lead    Adult Stress Echo W/ Cont or Stress Agent if Necessary Per Protocol    WOLF (obstructive sleep apnea)        Relevant Orders    Ambulatory Referral to Sleep Medicine (Completed)    CBC w AUTO Differential    Adult Stress Echo W/ Cont or Stress Agent if Necessary Per Protocol    PVC's (premature ventricular contractions)        Relevant Medications    nebivolol (Bystolic) 5 MG tablet    Other Relevant Orders    Magnesium    TSH+Free T4    Comprehensive Metabolic Panel    CBC w AUTO Differential    T3, free    Thyroid Peroxidase Antibody    Thyroid Stimulating Immunoglobulin    Adult Stress Echo W/ Cont or Stress Agent if Necessary Per Protocol    Atypical chest pain        Relevant Orders    Adult Stress Echo W/ Cont or Stress Agent if Necessary Per Protocol    Gastroesophageal reflux disease, unspecified whether esophagitis present              Orders Placed This Encounter   Procedures    Magnesium    TSH+Free T4    Comprehensive Metabolic Panel    T3, free    Thyroid Peroxidase Antibody    Thyroid Stimulating Immunoglobulin    Ambulatory Referral to Sleep Medicine    ECG 12 Lead    CBC w AUTO Differential     New  "Medications Ordered This Visit   Medications    nebivolol (Bystolic) 5 MG tablet     Sig: Take 1 tablet by mouth Daily.     Dispense:  30 tablet     Refill:  1       SUMMARY/DISCUSSION  Patient will need evaluation of CPAP machine and likely titration by sleep medicine.  Hemoglobin has been trending upward.  PVCs will start Bystolic 5 mg as he is also slightly hypertensive today at 140/82.  Check blood pressure at home 1-2 times daily and bring blood pressure log back to next office visit.  Call with any continued blood pressures above 140/80 or any lows.  He has had subclinical hyperthyroidism in the past, thyroid antibodies along with thyroid checked along with TSH, magnesium, CBC and electrolytes for PVCs today.  With atypical chest pain and PVCs along with night sweats and midthoracic discomfort we will order stress echo.  Atypical chest pain at this time.  He understands red flags to proceed to the ER.  Breakthrough heartburn, worsening over the last 1-1/2 weeks.  He has Protonix 40 mg at home.  Take daily for 1 month until he returns to office  Suspect headache likely due to uncontrolled sleep apnea, insomnia and potential elevated blood pressures    Next Appointment with me: 10/4/2024    Return in about 1 month (around 10/3/2024) for Next scheduled follow up.      VITAL SIGNS     Visit Vitals  /82 (BP Location: Left arm, Patient Position: Sitting, Cuff Size: Large Adult)   Pulse 70   Temp 98.1 °F (36.7 °C) (Temporal)   Ht 185.4 cm (73\")   Wt 130 kg (286 lb 6.4 oz)   SpO2 97%   BMI 37.79 kg/m²     Wt Readings from Last 3 Encounters:   09/03/24 130 kg (286 lb 6.4 oz)   08/07/24 125 kg (275 lb 12.8 oz)   07/24/24 126 kg (277 lb 6.4 oz)     Body mass index is 37.79 kg/m².      MEDICATIONS AT THE TIME OF OFFICE VISIT     Current Outpatient Medications on File Prior to Visit   Medication Sig    acetaminophen (TYLENOL) 500 MG tablet     albuterol sulfate  (90 Base) MCG/ACT inhaler INHALE 2 PUFFS BY " MOUTH EVERY 4 HOURS TAKE ONLY AS NEEDED FOR COUGH AND WHEEZING    gabapentin (NEURONTIN) 300 MG capsule Take 1 capsule by mouth 3 (Three) Times a Day.    lidocaine (LIDODERM) 5 % lidocaine 5 % topical patch   APPLY 1 PATCH TOPICALLY TO THE SKIN EVERY DAY AS NEEDED. MAY WEAR UP TO 12 HOURS    lisinopril (PRINIVIL,ZESTRIL) 20 MG tablet Take 1 tablet by mouth Daily.    metFORMIN (GLUCOPHAGE) 1000 MG tablet TAKE 1 TABLET BY MOUTH TWICE DAILY WITH MEALS    multivitamin (THERAGRAN) tablet tablet Take 1 tablet by mouth Every Night. HOLD FOR SURGERY    pantoprazole (PROTONIX) 40 MG EC tablet TAKE 1 TABLET BY MOUTH DAILY    rosuvastatin (CRESTOR) 10 MG tablet Take 1 tablet by mouth Every Night.    Semaglutide (Rybelsus) 14 MG tablet Take 1 tablet by mouth Every Morning Before Breakfast.    sildenafil (REVATIO) 20 MG tablet TAKE 2 TO 5 TABLETS BY MOUTH 1 HOUR BEFORE SEXUAL ACTIVITY AS NEEDED    tiZANidine (ZANAFLEX) 4 MG tablet TAKE 1 TABLET BY MOUTH AT NIGHT AS NEEDED FOR MUSCLE SPASMS     No current facility-administered medications on file prior to visit.          HISTORY OF PRESENT ILLNESS     For approximately 1 and half to 2 weeks patient has had atypical chest discomfort, GERD, insomnia due to night sweats, and headache.  When he woke with night sweats blood pressure was around 148 over 80s.  Glucose has not been low with last glucose at 82 fasting along with hemoglobin A1c of 5.2.  He has had no trouble with urination.  Headache with pressure between his ears and neck.  Midthoracic discomfort at times.  No numbness or tingling.  No lightheadedness or dizziness.  No visual changes.  He has not had his CPAP titrated in over 5 years.  Hemoglobin has been trending upward.  History of subclinical hyperthyroidism.  No unexplained weight loss.  Nonexertional chest discomfort.  No shortness of breath or felt palpitations.    REVIEW OF SYSTEMS     Constitutional neg except per HPI   Resp neg  CV atypical chest discomfort  GI  GERD   Psych insomnia   Neuro headache     PHYSICAL EXAMINATION     Physical Exam  Constitutional  No distress  Cardiovascular Rate  normal . Rhythm: Extra beat. Heart sounds:  normal  Pulmonary/Chest  Effort normal. Breath sounds:  normal  Psychiatric  Alert. Judgment and thought content normal. Mood normal      REVIEWED DATA     Labs:   Lab Results   Component Value Date    GLUCOSE 82 08/07/2024    BUN 16 08/07/2024    CREATININE 0.78 08/07/2024    EGFRRESULT 107 08/07/2024    EGFR 106.5 06/18/2024    BCR 21 (H) 08/07/2024    K 4.6 08/07/2024    CO2 25 08/07/2024    CALCIUM 10.0 08/07/2024    PROTENTOTREF 7.2 08/07/2024    ALBUMIN 4.5 08/07/2024    BILITOT 0.6 08/07/2024    AST 26 08/07/2024    ALT 29 08/07/2024     Lab Results   Component Value Date    WBC 8.1 08/07/2024    HGB 17.1 08/07/2024    HCT 50.2 08/07/2024    MCV 95 08/07/2024     08/07/2024     Lab Results   Component Value Date    CHOL 133 01/04/2022    CHLPL 158 08/07/2024    TRIG 214 (H) 08/07/2024    HDL 37 (L) 08/07/2024    LDL 85 08/07/2024      Lab Results   Component Value Date    TSH 0.566 08/07/2024     Brief Urine Lab Results  (Last result in the past 365 days)        Color   Clarity   Blood   Leuk Est   Nitrite   Protein   CREAT   Urine HCG        08/07/24 1021             108.6               Lab Results   Component Value Date    HGBA1C 5.2 08/07/2024       Imaging:           Medical Tests:     ECG 12 Lead    Date/Time: 9/3/2024 4:42 PM  Performed by: Mitesh Craig DNP, APRN    Authorized by: Mitesh Craig DNP, APRN  Comparison: compared with previous ECG from 6/17/2021  Comparison to previous ECG: Previous SR with no PVCs  Rhythm: sinus rhythm  Ectopy: infrequent PVCs    Clinical impression: abnormal EKG               Summary of old records / correspondence / consultant report:           Request outside records:

## 2024-09-03 NOTE — TELEPHONE ENCOUNTER
"Caller: Qasim Ch \"FLORY\"    Relationship: Self    Best call back number: [phone]    What specialty or service is being requested: EMG    What is the provider, practice or medical service name: [facility]    What is the office location: [facility]    Any additional details: PATIENT IS CURRENTLY SCHEDULED FOR EMG 10/3/24 IN THE EAST END OF [place], BUT WOULD LIKE THIS TO BE CLOSER TO HOME IF AT ALL POSSIBLE. PLEASE RETURN HIS CALL TO ADVISE/UPDATE.  "

## 2024-09-04 ENCOUNTER — TELEPHONE (OUTPATIENT)
Dept: INTERNAL MEDICINE | Age: 54
End: 2024-09-04
Payer: COMMERCIAL

## 2024-09-04 LAB
ALBUMIN SERPL-MCNC: 4.6 G/DL (ref 3.5–5.2)
ALBUMIN/GLOB SERPL: 1.8 G/DL
ALP SERPL-CCNC: 61 U/L (ref 39–117)
ALT SERPL-CCNC: 24 U/L (ref 1–41)
AST SERPL-CCNC: 24 U/L (ref 1–40)
BASOPHILS # BLD AUTO: 0.05 10*3/MM3 (ref 0–0.2)
BASOPHILS NFR BLD AUTO: 0.6 % (ref 0–1.5)
BILIRUB SERPL-MCNC: 0.6 MG/DL (ref 0–1.2)
BUN SERPL-MCNC: 15 MG/DL (ref 6–20)
BUN/CREAT SERPL: 17.9 (ref 7–25)
CALCIUM SERPL-MCNC: 9.7 MG/DL (ref 8.6–10.5)
CHLORIDE SERPL-SCNC: 101 MMOL/L (ref 98–107)
CO2 SERPL-SCNC: 25.8 MMOL/L (ref 22–29)
CREAT SERPL-MCNC: 0.84 MG/DL (ref 0.76–1.27)
EGFRCR SERPLBLD CKD-EPI 2021: 104.9 ML/MIN/1.73
EOSINOPHIL # BLD AUTO: 0.11 10*3/MM3 (ref 0–0.4)
EOSINOPHIL NFR BLD AUTO: 1.3 % (ref 0.3–6.2)
ERYTHROCYTE [DISTWIDTH] IN BLOOD BY AUTOMATED COUNT: 12.6 % (ref 12.3–15.4)
GLOBULIN SER CALC-MCNC: 2.6 GM/DL
GLUCOSE SERPL-MCNC: 70 MG/DL (ref 65–99)
HCT VFR BLD AUTO: 46.2 % (ref 37.5–51)
HGB BLD-MCNC: 16 G/DL (ref 13–17.7)
IMM GRANULOCYTES # BLD AUTO: 0.03 10*3/MM3 (ref 0–0.05)
IMM GRANULOCYTES NFR BLD AUTO: 0.3 % (ref 0–0.5)
LYMPHOCYTES # BLD AUTO: 2.45 10*3/MM3 (ref 0.7–3.1)
LYMPHOCYTES NFR BLD AUTO: 28.2 % (ref 19.6–45.3)
MAGNESIUM SERPL-MCNC: 1.8 MG/DL (ref 1.6–2.6)
MCH RBC QN AUTO: 32.4 PG (ref 26.6–33)
MCHC RBC AUTO-ENTMCNC: 34.6 G/DL (ref 31.5–35.7)
MCV RBC AUTO: 93.5 FL (ref 79–97)
MONOCYTES # BLD AUTO: 0.96 10*3/MM3 (ref 0.1–0.9)
MONOCYTES NFR BLD AUTO: 11.1 % (ref 5–12)
NEUTROPHILS # BLD AUTO: 5.08 10*3/MM3 (ref 1.7–7)
NEUTROPHILS NFR BLD AUTO: 58.5 % (ref 42.7–76)
NRBC BLD AUTO-RTO: 0 /100 WBC (ref 0–0.2)
PLATELET # BLD AUTO: 128 10*3/MM3 (ref 140–450)
POTASSIUM SERPL-SCNC: 4.1 MMOL/L (ref 3.5–5.2)
PROT SERPL-MCNC: 7.2 G/DL (ref 6–8.5)
RBC # BLD AUTO: 4.94 10*6/MM3 (ref 4.14–5.8)
SODIUM SERPL-SCNC: 141 MMOL/L (ref 136–145)
T3FREE SERPL-MCNC: 3.9 PG/ML (ref 2–4.4)
T4 FREE SERPL-MCNC: 1.23 NG/DL (ref 0.92–1.68)
THYROPEROXIDASE AB SERPL-ACNC: 9 IU/ML (ref 0–34)
TSH SERPL DL<=0.005 MIU/L-ACNC: 0.36 UIU/ML (ref 0.27–4.2)
TSI SER-ACNC: <0.1 IU/L (ref 0–0.55)
WBC # BLD AUTO: 8.68 10*3/MM3 (ref 3.4–10.8)

## 2024-09-04 NOTE — TELEPHONE ENCOUNTER
"    Caller: Qasim Ch \"FLORY\"    Relationship: Self    Best call back number: [phone]     Any additional details: PATIENT HAS A REFERRAL FOR AN ECHO BUT NEEDS TO HAVE IT AT [facility] IN [place].  HE SPOKE WITH JASPREET ABOUT THIS YESTERDAY.  HOWEVER, THEY CALLED TO SCHEDULE HIM TODAY AND IT WAS FOR [facility].  HE NEEDS THE ORDER CHANGED TO [facility].  IF YOU HAVE QUESTIONS PLEASE CONTACT PATIENT.      "

## 2024-09-05 ENCOUNTER — TELEPHONE (OUTPATIENT)
Dept: CARDIOLOGY | Facility: CLINIC | Age: 54
End: 2024-09-05
Payer: COMMERCIAL

## 2024-09-06 ENCOUNTER — HOSPITAL ENCOUNTER (OUTPATIENT)
Dept: CARDIOLOGY | Facility: HOSPITAL | Age: 54
Discharge: HOME | End: 2024-09-06
Admitting: NURSE PRACTITIONER
Payer: COMMERCIAL

## 2024-09-06 VITALS
BODY MASS INDEX: 37.91 KG/M2 | DIASTOLIC BLOOD PRESSURE: 80 MMHG | HEIGHT: 73 IN | SYSTOLIC BLOOD PRESSURE: 150 MMHG | WEIGHT: 286 LBS

## 2024-09-06 DIAGNOSIS — I49.3 PVC'S (PREMATURE VENTRICULAR CONTRACTIONS): ICD-10-CM

## 2024-09-06 DIAGNOSIS — R07.89 ATYPICAL CHEST PAIN: ICD-10-CM

## 2024-09-06 DIAGNOSIS — M54.6 THORACIC BACK PAIN, UNSPECIFIED BACK PAIN LATERALITY, UNSPECIFIED CHRONICITY: ICD-10-CM

## 2024-09-06 DIAGNOSIS — R61 NIGHT SWEATS: ICD-10-CM

## 2024-09-06 DIAGNOSIS — G47.33 OSA (OBSTRUCTIVE SLEEP APNEA): ICD-10-CM

## 2024-09-06 LAB
AORTIC ARCH: 3 CM
AORTIC DIMENSIONLESS INDEX: 0.7 (DI)
BH CV ECHO MEAS - ACS: 2.48 CM
BH CV ECHO MEAS - AO MAX PG: 9.6 MMHG
BH CV ECHO MEAS - AO MEAN PG: 5 MMHG
BH CV ECHO MEAS - AO ROOT DIAM: 3.1 CM
BH CV ECHO MEAS - AO V2 MAX: 154.6 CM/SEC
BH CV ECHO MEAS - AO V2 VTI: 30.6 CM
BH CV ECHO MEAS - AVA(I,D): 2.8 CM2
BH CV ECHO MEAS - EDV(CUBED): 199.9 ML
BH CV ECHO MEAS - EDV(MOD-SP2): 164 ML
BH CV ECHO MEAS - EDV(MOD-SP4): 157 ML
BH CV ECHO MEAS - EF(MOD-BP): 55.2 %
BH CV ECHO MEAS - EF(MOD-SP2): 54.9 %
BH CV ECHO MEAS - EF(MOD-SP4): 52.9 %
BH CV ECHO MEAS - ESV(CUBED): 58.3 ML
BH CV ECHO MEAS - ESV(MOD-SP2): 74 ML
BH CV ECHO MEAS - ESV(MOD-SP4): 74 ML
BH CV ECHO MEAS - FS: 33.7 %
BH CV ECHO MEAS - IVS/LVPW: 1.01 CM
BH CV ECHO MEAS - IVSD: 1.19 CM
BH CV ECHO MEAS - LAT PEAK E' VEL: 8.8 CM/SEC
BH CV ECHO MEAS - LV DIASTOLIC VOL/BSA (35-75): 62.7 CM2
BH CV ECHO MEAS - LV MASS(C)D: 295.9 GRAMS
BH CV ECHO MEAS - LV MAX PG: 4.7 MMHG
BH CV ECHO MEAS - LV MEAN PG: 2.5 MMHG
BH CV ECHO MEAS - LV SYSTOLIC VOL/BSA (12-30): 29.5 CM2
BH CV ECHO MEAS - LV V1 MAX: 108.9 CM/SEC
BH CV ECHO MEAS - LV V1 VTI: 22 CM
BH CV ECHO MEAS - LVIDD: 5.8 CM
BH CV ECHO MEAS - LVIDS: 3.9 CM
BH CV ECHO MEAS - LVOT AREA: 4 CM2
BH CV ECHO MEAS - LVOT DIAM: 2.24 CM
BH CV ECHO MEAS - LVPWD: 1.18 CM
BH CV ECHO MEAS - MED PEAK E' VEL: 8.4 CM/SEC
BH CV ECHO MEAS - MR MAX PG: 97.2 MMHG
BH CV ECHO MEAS - MR MAX VEL: 493 CM/SEC
BH CV ECHO MEAS - MV A DUR: 0.09 SEC
BH CV ECHO MEAS - MV A MAX VEL: 58.9 CM/SEC
BH CV ECHO MEAS - MV DEC SLOPE: 333.7 CM/SEC2
BH CV ECHO MEAS - MV DEC TIME: 0.22 SEC
BH CV ECHO MEAS - MV E MAX VEL: 98.1 CM/SEC
BH CV ECHO MEAS - MV E/A: 1.67
BH CV ECHO MEAS - MV MAX PG: 4.6 MMHG
BH CV ECHO MEAS - MV MEAN PG: 1.75 MMHG
BH CV ECHO MEAS - MV P1/2T: 95.3 MSEC
BH CV ECHO MEAS - MV V2 VTI: 36.4 CM
BH CV ECHO MEAS - MVA(P1/2T): 2.31 CM2
BH CV ECHO MEAS - MVA(VTI): 2.39 CM2
BH CV ECHO MEAS - PA ACC TIME: 0.08 SEC
BH CV ECHO MEAS - PA V2 MAX: 109.5 CM/SEC
BH CV ECHO MEAS - RAP SYSTOLE: 8 MMHG
BH CV ECHO MEAS - RV MAX PG: 4.1 MMHG
BH CV ECHO MEAS - RV V1 MAX: 100.8 CM/SEC
BH CV ECHO MEAS - RV V1 VTI: 23.8 CM
BH CV ECHO MEAS - RVSP: 20 MMHG
BH CV ECHO MEAS - SV(LVOT): 86.9 ML
BH CV ECHO MEAS - SV(MOD-SP2): 90 ML
BH CV ECHO MEAS - SV(MOD-SP4): 83 ML
BH CV ECHO MEAS - SVI(LVOT): 34.7 ML/M2
BH CV ECHO MEAS - SVI(MOD-SP2): 35.9 ML/M2
BH CV ECHO MEAS - SVI(MOD-SP4): 33.1 ML/M2
BH CV ECHO MEAS - TAPSE (>1.6): 1.73 CM
BH CV ECHO MEAS - TR MAX PG: 11.9 MMHG
BH CV ECHO MEAS - TR MAX VEL: 172.4 CM/SEC
BH CV ECHO MEASUREMENTS AVERAGE E/E' RATIO: 11.41
BH CV STRESS BP STAGE 1: NORMAL
BH CV STRESS BP STAGE 2: NORMAL
BH CV STRESS DURATION MIN STAGE 1: 3
BH CV STRESS DURATION MIN STAGE 2: 3
BH CV STRESS DURATION MIN STAGE 3: 1
BH CV STRESS DURATION SEC STAGE 1: 0
BH CV STRESS DURATION SEC STAGE 2: 0
BH CV STRESS DURATION SEC STAGE 3: 30
BH CV STRESS ECHO POST STRESS EJECTION FRACTION EF: 72 %
BH CV STRESS GRADE STAGE 1: 10
BH CV STRESS GRADE STAGE 2: 12
BH CV STRESS GRADE STAGE 3: 14
BH CV STRESS HR STAGE 1: 119
BH CV STRESS HR STAGE 2: 138
BH CV STRESS HR STAGE 3: 158
BH CV STRESS METS STAGE 1: 5
BH CV STRESS METS STAGE 2: 7.5
BH CV STRESS METS STAGE 3: 9
BH CV STRESS PROTOCOL 1: NORMAL
BH CV STRESS SPEED STAGE 1: 1.7
BH CV STRESS SPEED STAGE 2: 2.5
BH CV STRESS SPEED STAGE 3: 3.4
BH CV STRESS STAGE 1: 1
BH CV STRESS STAGE 2: 2
BH CV STRESS STAGE 3: 3
BH CV XLRA - RV BASE: 3.5 CM
BH CV XLRA - RV LENGTH: 7.9 CM
BH CV XLRA - RV MID: 2.8 CM
BH CV XLRA - TDI S': 20.7 CM/SEC
LEFT ATRIUM VOLUME INDEX: 31 ML/M2
MAXIMAL PREDICTED HEART RATE: 168 BPM
PERCENT MAX PREDICTED HR: 94.05 %
SINUS: 3 CM
STRESS BASELINE BP: NORMAL MMHG
STRESS BASELINE HR: 81 BPM
STRESS PERCENT HR: 111 %
STRESS POST ESTIMATED WORKLOAD: 9 METS
STRESS POST EXERCISE DUR MIN: 7 MIN
STRESS POST EXERCISE DUR SEC: 30 SEC
STRESS POST PEAK BP: NORMAL MMHG
STRESS POST PEAK HR: 158 BPM
STRESS TARGET HR: 143 BPM

## 2024-09-06 PROCEDURE — 93350 STRESS TTE ONLY: CPT

## 2024-09-06 PROCEDURE — 93017 CV STRESS TEST TRACING ONLY: CPT

## 2024-09-06 PROCEDURE — 25510000001 PERFLUTREN 6.52 MG/ML SUSPENSION 2 ML VIAL: Performed by: NURSE PRACTITIONER

## 2024-09-06 PROCEDURE — 93320 DOPPLER ECHO COMPLETE: CPT

## 2024-09-06 PROCEDURE — 93325 DOPPLER ECHO COLOR FLOW MAPG: CPT

## 2024-09-06 RX ADMIN — PERFLUTREN 6 ML: 6.52 INJECTION, SUSPENSION INTRAVENOUS at 09:53

## 2024-09-26 DIAGNOSIS — D69.6 THROMBOCYTOPENIA: Primary | ICD-10-CM

## 2024-10-08 NOTE — PROGRESS NOTES
Subjective   History of Present Illness: Qasim Ch is a 54 y.o. male is here today for follow-up on back pain. Doppler ankle brachial was done on 8/13/24. EMG/NCS 9/16/24.  He reports that his symptoms have improved over the past month.  He underwent a injection of the left SI joint and this has reduced his symptoms significantly.  He also wears his SI joint belt sporadically which has reduced his symptoms overall.  He reports that he has had this pain since at least 2018.  The pain is mostly in the left lower back and radiates into his buttock area and down to his foot.    History of Present Illness    The following portions of the patient's history were reviewed and updated as appropriate: allergies, current medications, past family history, past medical history, past social history, past surgical history, and problem list.    Past Medical History:   Diagnosis Date    Bilateral leg cramps     AT     Colon polyps     FOLLOWED BY DR. SERA LE    DDD (degenerative disc disease), lumbar 2018    L3,L4,L5    Diabetes     TYPE 2, NIDDM    Disorder of gallbladder     Diverticulitis 09/2016    SIGMOID    Diverticulitis of colon 04/18/2014    Fatty liver     GERD (gastroesophageal reflux disease)     Hyperlipidemia     Hypertension     Hypertriglyceridemia     Hypogonadism 02/07/2013    Impotence of organic origin 02/22/2012    Irritable bowel syndrome 01/14/2013    Lumbar compression fracture     L4    Lumbar radiculopathy     Muscle spasm of left calf     Numbness and tingling of foot     LEFT FOOT, S/P BACK SURGERY    Obesity     WOLF (obstructive sleep apnea)     cpap    Plantar fasciitis 06/09/2016    Polycythemia 02/22/2012    Post laminectomy syndrome     Right sided abdominal pain 02/28/2021    SEEN AT [facility] ER    RUQ pain 05/02/2021    SEEN AT [facility] ER    Spinal stenosis of lumbar region without neurogenic claudication 09/05/2018    Splenomegaly 05/2021    Spondylolisthesis         Past Surgical History:    Procedure Laterality Date    ANAL FISSURECTOMY N/A 04/15/2009    DR.RAYMOND MICHAELS AT [facility]    CHOLECYSTECTOMY  2022    CHOLECYSTECTOMY WITH INTRAOPERATIVE CHOLANGIOGRAM N/A 06/23/2021    Procedure: CHOLECYSTECTOMY LAPAROSCOPIC INTRAOPERATIVE CHOLANGIOGRAM;  Surgeon: Chris Carrion Jr., MD;  Location: [facility] MAIN OR;  Service: General;  Laterality: N/A;    COLON SURGERY N/A 09/16/2016    SIGMOID COLECTOMY, D/T DIVERTICULITIS, DR. TYLER DUNBAR AT [facility]    COLONOSCOPY N/A 04/15/2009    MIXED ADENOMATOUS POLYP IN RECTUM, DR. HERLINDA MICHAELS AT [facility]    COLONOSCOPY N/A 01/28/2021    7 MM HYPERPLASTIC POLYP IN RECTUM, MULTIPLE SMALL AND LARGE DIVERTICULA IN ENTIRE COLON, RESCOPE IN 5 YRS, DR. SERA LE AT [facility]    COLONOSCOPY W/ POLYPECTOMY N/A 12/11/2013    2 TUBULOVILLOUS ADENOMA POLYPS IN CECUM, 8 MM TUBULOVILLOUS ADENOMA POLYP IN ASCENDING, 14 MM TUBULOVILLOUS ADENOMA POLYP IN DISTAL ASCENDING, 5 MM TUBULAR ADENOMA POLYP IN ASCENDING, 5 MM BENIGN POLYP IN RECTUM, RESCOPE IN 2 YRS, DR. SERA LE AT [facility]    ENDOSCOPY AND COLONOSCOPY N/A 2014    HEMORRHOIDECTOMY N/A 04/15/2009    DR.RAYMOND MICHAELS AT [facility]    HERNIA REPAIR Left 09/14/2017    lap left inguinal hernia repair, ventral hernia repair, DR. TYLER DUNBAR AT [facility]    LUMBAR EPIDURAL INJECTION N/A     lumbar    LUMBAR SPINE POSTERIOR N/A 12/03/2018    TLIF L3-L5, DR. MOHAMMAD MAJD AT [facility]     TONSILLECTOMY Bilateral     UPPER GASTROINTESTINAL ENDOSCOPY      VENTRAL HERNIA REPAIR N/A 03/01/2018    LAPAROSCOPIC WITH PREVIOUS MESH REMOVAL, DR. TYLER DUNBAR AT [facility]          Current Outpatient Medications:     acetaminophen (TYLENOL) 500 MG tablet, , Disp: , Rfl:     albuterol sulfate  (90 Base) MCG/ACT inhaler, INHALE 2 PUFFS BY MOUTH EVERY 4 HOURS TAKE ONLY AS NEEDED FOR COUGH AND WHEEZING, Disp: , Rfl:     gabapentin (NEURONTIN) 300 MG capsule, Take 1 capsule by mouth 3 (Three) Times a Day., Disp: 90 capsule, Rfl: 0    lidocaine (LIDODERM) 5 %,  lidocaine 5 % topical patch  APPLY 1 PATCH TOPICALLY TO THE SKIN EVERY DAY AS NEEDED. MAY WEAR UP TO 12 HOURS, Disp: , Rfl:     lisinopril (PRINIVIL,ZESTRIL) 20 MG tablet, Take 1 tablet by mouth Daily., Disp: 90 tablet, Rfl: 1    metFORMIN (GLUCOPHAGE) 1000 MG tablet, TAKE 1 TABLET BY MOUTH TWICE DAILY WITH MEALS, Disp: 180 tablet, Rfl: 0    multivitamin (THERAGRAN) tablet tablet, Take 1 tablet by mouth Every Night. HOLD FOR SURGERY, Disp: , Rfl:     nebivolol (BYSTOLIC) 5 MG tablet, TAKE 1 TABLET BY MOUTH DAILY, Disp: 90 tablet, Rfl: 0    pantoprazole (PROTONIX) 40 MG EC tablet, TAKE 1 TABLET BY MOUTH DAILY, Disp: 90 tablet, Rfl: 1    rosuvastatin (CRESTOR) 10 MG tablet, Take 1 tablet by mouth Every Night., Disp: 90 tablet, Rfl: 1    Semaglutide (Rybelsus) 14 MG tablet, Take 1 tablet by mouth Every Morning Before Breakfast., Disp: 30 tablet, Rfl: 0    sildenafil (REVATIO) 20 MG tablet, TAKE 2 TO 5 TABLETS BY MOUTH 1 HOUR BEFORE SEXUAL ACTIVITY AS NEEDED, Disp: 20 tablet, Rfl: 5    tiZANidine (ZANAFLEX) 4 MG tablet, TAKE 1 TABLET BY MOUTH AT NIGHT AS NEEDED FOR MUSCLE SPASMS, Disp: 30 tablet, Rfl: 1     Allergies   Allergen Reactions    Penicillin G Anaphylaxis        Social History     Socioeconomic History    Marital status:     Number of children: 2   Tobacco Use    Smoking status: Former     Current packs/day: 0.00     Average packs/day: 0.5 packs/day for 10.0 years (5.0 ttl pk-yrs)     Types: Cigarettes     Start date: 2002     Quit date: 2012     Years since quittin.7    Smokeless tobacco: Current     Types: Chew   Vaping Use    Vaping status: Never Used   Substance and Sexual Activity    Alcohol use: Not Currently    Drug use: Never    Sexual activity: Not Currently     Partners: Female        Family History   Problem Relation Age of Onset    No Known Problems Mother     Colon cancer Father     Malig Hyperthermia Neg Hx     Thyroid cancer Neg Hx     Liver disease Neg Hx         Review of  "Systems   Musculoskeletal:  Positive for back pain (leg/ left buttock pain).   Neurological:  Positive for weakness (L leg) and numbness.   All other systems reviewed and are negative.      Objective     Vitals:    10/14/24 1530   BP: 130/68   Pulse: 58   Temp: 97.1 °F (36.2 °C)   SpO2: 97%   Weight: 128 kg (281 lb 4.8 oz)   Height: 185.4 cm (73\")     Body mass index is 37.11 kg/m².      Physical Exam  Neurologic Exam  Awake, alert, oriented x3  Pupils equal round reactive to light  Extraocular muscles intact  Face symmetric  Speech is fluent and clear  Motor exam  Bilateral deltoids 5/5, bilateral biceps 5/5, bilateral triceps 5/5, bilateral wrist extension 5/5 bilateral hand  5/5  Bilateral hip flexion 5/5, bilateral knee extension 5/5, bilateral DF/PF 5/5  Steady normal gait  Able to detect  light touch in all 4 extremities        Assessment & Plan     Medical Decision Makin-year-old male with a history of a prior L3-4 and L4-5 TLIF by Dr. Bradshaw in 2018  -Since the procedure he has had lower back pain with pain that radiates into his left lower extremity.  He also reports paresthesias and numbness in his left foot that radiated up to his knee.  Since taking gabapentin the left lower extremity symptoms have significantly improved.  A lot of the pain seems to be localized to the SI joint and has further been reduced with an SI joint injection.  -He is requesting a referral to physical therapy for further evaluation and treatment of his lower back pain and left lower extremity pain.  After reviewing all of his symptoms today and his imaging.  I believe most of his pain and symptoms are residual pain from before his surgery and related to a lumbosacral radiculopathy.  The EMG/nerve conduction study was inconclusive and nonspecific.  I have encouraged him to follow-up with pain management if his symptoms recur for consideration of a spinal cord stimulator  -I will plan to have him follow-up with me " as needed with any new neurologic symptoms or changes in his lower back pain or lower extremity pain.   Diagnoses and all orders for this visit:    1. Chronic left SI joint pain (Primary)    2. History of lumbar spinal fusion  -     Ambulatory Referral to Physical Therapy for Evaluation & Treatment    3. Lower extremity pain, diffuse, unspecified laterality    4. Lumbosacral radiculopathy  -     Ambulatory Referral to Physical Therapy for Evaluation & Treatment    5. Chronic low back pain with left-sided sciatica, unspecified back pain laterality  -     Ambulatory Referral to Physical Therapy for Evaluation & Treatment      Return if symptoms worsen or fail to improve.

## 2024-10-14 ENCOUNTER — OFFICE VISIT (OUTPATIENT)
Dept: NEUROSURGERY | Facility: CLINIC | Age: 54
End: 2024-10-14
Payer: COMMERCIAL

## 2024-10-14 VITALS
HEIGHT: 73 IN | WEIGHT: 281.3 LBS | OXYGEN SATURATION: 97 % | TEMPERATURE: 97.1 F | DIASTOLIC BLOOD PRESSURE: 68 MMHG | BODY MASS INDEX: 37.28 KG/M2 | SYSTOLIC BLOOD PRESSURE: 130 MMHG | HEART RATE: 58 BPM

## 2024-10-14 DIAGNOSIS — G89.29 CHRONIC LEFT SI JOINT PAIN: Primary | ICD-10-CM

## 2024-10-14 DIAGNOSIS — M54.17 LUMBOSACRAL RADICULOPATHY: ICD-10-CM

## 2024-10-14 DIAGNOSIS — Z98.1 HISTORY OF LUMBAR SPINAL FUSION: ICD-10-CM

## 2024-10-14 DIAGNOSIS — M54.42 CHRONIC LOW BACK PAIN WITH LEFT-SIDED SCIATICA, UNSPECIFIED BACK PAIN LATERALITY: ICD-10-CM

## 2024-10-14 DIAGNOSIS — G89.29 CHRONIC LOW BACK PAIN WITH LEFT-SIDED SCIATICA, UNSPECIFIED BACK PAIN LATERALITY: ICD-10-CM

## 2024-10-14 DIAGNOSIS — M53.3 CHRONIC LEFT SI JOINT PAIN: Primary | ICD-10-CM

## 2024-10-14 DIAGNOSIS — M79.606 LOWER EXTREMITY PAIN, DIFFUSE, UNSPECIFIED LATERALITY: ICD-10-CM

## 2024-10-14 PROCEDURE — 99214 OFFICE O/P EST MOD 30 MIN: CPT | Performed by: NEUROLOGICAL SURGERY

## 2024-10-16 DIAGNOSIS — E11.9 TYPE 2 DIABETES MELLITUS WITHOUT COMPLICATION, WITHOUT LONG-TERM CURRENT USE OF INSULIN: ICD-10-CM

## 2024-11-04 ENCOUNTER — TREATMENT (OUTPATIENT)
Dept: PHYSICAL THERAPY | Facility: CLINIC | Age: 54
End: 2024-11-04
Payer: COMMERCIAL

## 2024-11-04 DIAGNOSIS — M25.552 LEFT HIP PAIN: ICD-10-CM

## 2024-11-04 DIAGNOSIS — M53.3 SI (SACROILIAC) PAIN: Primary | ICD-10-CM

## 2024-11-04 DIAGNOSIS — M25.652 HIP STIFFNESS, LEFT: ICD-10-CM

## 2024-11-04 PROCEDURE — 97110 THERAPEUTIC EXERCISES: CPT

## 2024-11-04 PROCEDURE — 97140 MANUAL THERAPY 1/> REGIONS: CPT

## 2024-11-04 PROCEDURE — 97161 PT EVAL LOW COMPLEX 20 MIN: CPT

## 2024-11-04 NOTE — PROGRESS NOTES
Physical Therapy Initial Evaluation and Plan of Care                          [address] [place] [address] [address]. [address] [address]    Patient: Qasim Ch   : 1970  Diagnosis/ICD-10 Code:  SI (sacroiliac) pain [M53.3]  Referring practitioner: Wilberto Chapman MD  Date of Initial Visit: 2024  Today's Date: 2024  Patient seen for 1 sessions           Subjective Questionnaire: Oswestry: 42% limitation      Subjective Evaluation    History of Present Illness  Mechanism of injury: 54-year-old male with a history of a prior L3-4 and L4-5 TLIF by Dr. Bradshaw in 2018  -Since the procedure he has had lower back pain with pain that radiates into his left lower extremity.  He also reports paresthesias and numbness in his left foot that radiated up to his knee.  Since taking gabapentin the left lower extremity symptoms have significantly improved.  A lot of the pain seems to be localized to the SI joint and has further been reduced with an SI joint injection.   He also wears his SI joint belt sporadically which has reduced his symptoms overall.   The EMG/nerve conduction study was inconclusive and nonspecific    Pain is in left hip. He struggles with bending over to put shoes/socks on. Pt describes pain in anterior hip but also in center of hip joint. Pt has experienced left buttock/piriformis area pain since surgery in 2018. Pain is constant but intensity varies.  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              Objective          Tenderness     Left Hip   Tenderness in the greater trochanter.     Additional Tenderness Details  Tender along proximal IT band.     Active Range of Motion   Left Hip   Flexion: 80 degrees     Passive Range of Motion   Left Hip   Flexion: 100 degrees with pain  Adduction: 0 degrees with pain  External rotation (90/90): 30 degrees with pain    Strength/Myotome Testing     Left Hip   Planes of  Motion   Flexion: 4-  Abduction: 4-    Additional Strength Details  Pain with just active SLR supine and sidelying. Pain much better with SI stabilization.       Tests     Left Hip   Positive Brianne and piriformis.     Left Hip Flexibility Comments:   Overall patient has poor flexibility throughout left hip.       See Exercise, Manual, and Modality Logs for complete treatment.     Assessment & Plan       Assessment  Impairments: abnormal gait, abnormal muscle firing, abnormal muscle tone, abnormal or restricted ROM, activity intolerance, impaired balance, impaired physical strength, lacks appropriate home exercise program, pain with function and safety issue   Functional limitations: carrying objects, lifting, sleeping, walking, pulling, pushing, uncomfortable because of pain, moving in bed, sitting, standing, stooping and unable to perform repetitive tasks   Assessment details: Pt presents with left side SI/back pain and left hip pain which is affecting their functional status.  Pt has limitations of hip mobility/motion and SI stabilization. Pt will benefit from skilled physical therapy to address these limitations.   Prognosis: good    Goals  Plan Goals: Short Term Goals: 4 weeks  1. Pt will be independent with all exercises assigned to HEP.  2. Pt will report pain decreased to a rating of 4.     Long Term Goals: 12 weeks  1. The patient will report a pain rating of 2 or better in order to improve functional mobility.   2.   The patient will demonstrate left hip AROM as follows: 100 of flexion and 10 degrees of adduction.  3.   The patient will demonstrate 25 % limitation as scored on the TATO.  4.   Pt will be able to bend over in sitting and put his shoes and socks on.         Plan  Therapy options: will be seen for skilled therapy services  Planned modality interventions: cryotherapy, electrical stimulation/Russian stimulation, TENS, dry needling and traction  Planned therapy interventions: balance/weight-bearing  training, ADL retraining, soft tissue mobilization, strengthening, stretching, therapeutic activities, joint mobilization, home exercise program, functional ROM exercises, flexibility, body mechanics training, postural training, neuromuscular re-education, manual therapy, abdominal trunk stabilization, IADL retraining and spinal/joint mobilization  Frequency: 2x week  Duration in weeks: 12  Treatment plan discussed with: patient        Visit Diagnoses:    ICD-10-CM ICD-9-CM   1. SI (sacroiliac) pain  M53.3 724.6   2. Left hip pain  M25.552 719.45   3. Hip stiffness, left  M25.652 719.55       History # of Personal Factors and/or Comorbidities: LOW (0)  Examination of Body System(s): # of elements: LOW (1-2)  Clinical Presentation: STABLE   Clinical Decision Making: LOW       Timed:           Therapeutic Exercise:    10     mins  38697;     Neuromuscular Ponce:    0    mins  68021;  Manual Therapy:    15     mins  46370;    Therapeutic Activity:     0     mins  39834;     Gait Trainin     mins  54618;     Ultrasound:     0     mins  39422;    Ionto                               0    mins   93320  Self Care                       0     mins   40830        Un-Timed:  Electrical Stimulation:    0     mins  38388 ( );  Dry Needling     0     mins self-pay  Traction     0     mins 94074    Low Eval     25     Mins  43315  Mod Eval     0     Mins  14085  High Eval                       0     Mins  77157  Re-Eval                           0    mins  83630    Timed Treatment:   25   mins   Total Treatment:     50   mins    PT SIGNATURE: Thomas Luke PT    Electronically signed 2024    [place] License: PT - [id number]  [place] License: PT - [id number]      Initial Certification  Certification Period: 2024 thru 2025  I certify that the therapy services are furnished while this patient is under my care.  The services outlined above are required by this patient, and will be reviewed every 90 days.     PHYSICIAN:  Wilberto Chapman MD  NPI: [id number]      DATE:     Please sign and return via fax to [phone]. Thank you, [facility].

## 2024-11-13 ENCOUNTER — TREATMENT (OUTPATIENT)
Dept: PHYSICAL THERAPY | Facility: CLINIC | Age: 54
End: 2024-11-13
Payer: COMMERCIAL

## 2024-11-13 DIAGNOSIS — M25.552 LEFT HIP PAIN: Primary | ICD-10-CM

## 2024-11-13 DIAGNOSIS — M53.3 SI (SACROILIAC) PAIN: ICD-10-CM

## 2024-11-13 DIAGNOSIS — M25.652 HIP STIFFNESS, LEFT: ICD-10-CM

## 2024-11-13 PROCEDURE — 97140 MANUAL THERAPY 1/> REGIONS: CPT

## 2024-11-13 PROCEDURE — 97110 THERAPEUTIC EXERCISES: CPT

## 2024-11-13 NOTE — PROGRESS NOTES
Physical Therapy Daily Treatment Note                        [address] [place] [address] Dr.  [address] [place]. [address]    Patient: Qasim hC   : 1970  Diagnosis/ICD-10 Code:  Left hip pain [M25.552]  Referring practitioner: Wilberto Chapman MD  Date of Initial Visit: Type: THERAPY  Noted: 2024  Today's Date: 2024  Patient seen for 2 sessions           Subjective   Pt reports feeling better after stretches last visit. He did them a couple of times at home.     Objective   See Exercise, Manual, and Modality Logs for complete treatment.     Assessment/Plan  Pt having moderate pain with piriformis stretch. Better with hip flexor stretch. Need to push for more stabilization exercises.        Timed:  Therapeutic Exercise:    10     mins  86285;     Neuromuscular Ponce:   0    mins  39246;  Manual Therapy:    25     mins  86927;    Gait Trainin     mins  15497;     Ultrasound:                    0     mins  11908  Therapeutic Activity:     0     mins  06393;    Un-timed:  Electrical Stimulation:    0     mins  07454 (MC );  Mechanical Traction      0     mins  23449  Dry Needling     0     mins self-pay        Timed Treatment:   35   mins   Total Treatment:     35   mins    Thomas Luke PT  Physical Therapist    Electronically signed 2024    [place] License:  PT - [id number]  [place] License: PT - [id number]

## 2024-11-15 RX ORDER — PANTOPRAZOLE SODIUM 40 MG/1
40 TABLET, DELAYED RELEASE ORAL DAILY
Qty: 90 TABLET | Refills: 1 | Status: SHIPPED | OUTPATIENT
Start: 2024-11-15

## 2024-11-20 ENCOUNTER — TELEPHONE (OUTPATIENT)
Dept: PHYSICAL THERAPY | Facility: OTHER | Age: 54
End: 2024-11-20
Payer: COMMERCIAL

## 2024-11-20 NOTE — TELEPHONE ENCOUNTER
"  Caller: Qasim Ch \"FLORY\"    Relationship: Self    What was the call regarding: PATIENT CANCELLED APPT TODAY BECAUSE THEY CANT MAKE IT       "

## 2024-11-25 DIAGNOSIS — M54.17 LUMBOSACRAL RADICULOPATHY: ICD-10-CM

## 2024-11-27 ENCOUNTER — TELEPHONE (OUTPATIENT)
Dept: PHYSICAL THERAPY | Facility: OTHER | Age: 54
End: 2024-11-27
Payer: COMMERCIAL

## 2024-11-27 NOTE — TELEPHONE ENCOUNTER
"  Caller: Qasim Ch \"FLORY\"    Relationship: Self    What was the call regarding: PATIENT CANCELLED APPT TODAY BECAUSE THEY HAVE TO WORK      "

## 2024-12-02 ENCOUNTER — TELEPHONE (OUTPATIENT)
Dept: NEUROSURGERY | Facility: CLINIC | Age: 54
End: 2024-12-02

## 2024-12-02 DIAGNOSIS — I49.3 PVC'S (PREMATURE VENTRICULAR CONTRACTIONS): ICD-10-CM

## 2024-12-02 RX ORDER — GABAPENTIN 300 MG/1
300 CAPSULE ORAL 3 TIMES DAILY
Qty: 90 CAPSULE | Refills: 1 | Status: SHIPPED | OUTPATIENT
Start: 2024-12-02

## 2024-12-02 NOTE — PROGRESS NOTES
"Chief Complaint  NEW PATIENT (WOLF)    Subjective          Qasim Ch presents to [facility] NEUROLOG[facility]  History of Present Illness  New patient WOLF currently on cpap machine x's 8-9 years  he states he is benefiting from pap therapy, he uses full face mask and goes through [employer] for supplies.    Sleep testing history:    On NPSG at [facility] ,  patient had Mild obstructive sleep apnea syndrome with apnea-hypopnea index of 7 per sleep hour,     PAP download:  The patient is on biPAP therapy at 5-15/4-4 cm/H2O.   Data indicates Excellent compliance. With 100% usage for more than 4 hours with an average usage of 6 hours 56 minutes. AHI down to 0.8 .  Average pressures 12/8.  Average large leak 0sec.     The patient's hypersomnia has resolved       Gilchrist Sleepiness Scale:  Sitting and reading JS Gilchrist Sleepiness: 0 WatchingTV JS Gilchrist Sleepiness: 0  Sitting, inactive, in a public place JS Gilchrist Sleepiness: 0  As a passenger in a car for 1 hour w/o a break  JS Gilchrist Sleepiness: 0  Lying down to rest in the afternoon  JS Gilchrist Sleepiness: 0  Sitting and talking to someone  JS Gilchrist Sleepiness: 0  Sitting quietly after a lunch  JS Gilchrist Sleepiness: 0  In a car, while stopped for traffic or a light  JS Gilchrist Sleepiness: 0  Total 0    Review of Systems   Constitutional: Negative.    HENT: Negative.     Respiratory: Negative.     Genitourinary: Negative.    Neurological: Negative.    Psychiatric/Behavioral: Negative.     All other systems reviewed and are negative.        Objective   Vital Signs:   /87   Pulse 72   Ht 185.4 cm (73\")   Wt 130 kg (286 lb)   BMI 37.73 kg/m²     Physical Exam  Vitals reviewed.   HENT:      Nose: Nose normal.   Eyes:      Pupils: Pupils are equal, round, and reactive to light.   Cardiovascular:      Rate and Rhythm: Normal rate.   Pulmonary:      Effort: Pulmonary effort is normal.   Skin:     General: Skin is warm.   Neurological:      General: No " focal deficit present.      Mental Status: He is alert and oriented to person, place, and time.   Psychiatric:         Mood and Affect: Mood normal.      Result Review :                 Assessment and Plan    Diagnoses and all orders for this visit:    1. Obstructive sleep apnea syndrome (Primary)  Overview:  On CPAP         Continue BiPAP at current setting   The patient is compliant with and benefiting from PAP therapy.      Follow Up   Return in about 1 year (around 12/3/2025).    Patient was given instructions and counseling regarding his condition or for health maintenance advice. Please see specific information pulled into the AVS if appropriate.       This document has been electronically signed by Joseph Seipel, MD on December 3, 2024 16:57 EST

## 2024-12-02 NOTE — TELEPHONE ENCOUNTER
"  Caller: Qasim Ch \"FLORY\"    Relationship: Self    Best call back number: [phone]    What is the best time to reach you: ANYTIME    Who are you requesting to speak with (clinical staff, provider,  specific staff member): CLINICAL STAFF    Do you know the name of the person who called: NA    What was the call regarding: PATIENT CALLED AND STATES THAT HE HAS BEEN WAITING FOR A REFILL OF HIS GABAPENTIN-PATIENT STATES THAT HE RECEIVED A MESSAGE FROM [employer] ADVISING THEY ARE STILL WAITING ON AUTH. PATIENT ADVISED THAT HE IS OUT OF MEDICATION-SENDING TO OFFICE AS HIGH PRIORITY AS PATIENT HAS BEEN WITHOUT MEDICATION-THANK YOU    Is it okay if the provider responds through [facility]:     "

## 2024-12-03 ENCOUNTER — OFFICE VISIT (OUTPATIENT)
Dept: NEUROLOGY | Facility: CLINIC | Age: 54
End: 2024-12-03
Payer: COMMERCIAL

## 2024-12-03 VITALS
BODY MASS INDEX: 37.91 KG/M2 | WEIGHT: 286 LBS | DIASTOLIC BLOOD PRESSURE: 87 MMHG | SYSTOLIC BLOOD PRESSURE: 146 MMHG | HEIGHT: 73 IN | HEART RATE: 72 BPM

## 2024-12-03 DIAGNOSIS — G47.33 OBSTRUCTIVE SLEEP APNEA SYNDROME: Primary | Chronic | ICD-10-CM

## 2024-12-03 PROCEDURE — 99203 OFFICE O/P NEW LOW 30 MIN: CPT | Performed by: PSYCHIATRY & NEUROLOGY

## 2024-12-04 RX ORDER — NEBIVOLOL 5 MG/1
5 TABLET ORAL DAILY
Qty: 90 TABLET | Refills: 1 | Status: SHIPPED | OUTPATIENT
Start: 2024-12-04

## 2024-12-05 ENCOUNTER — PATIENT ROUNDING (BHMG ONLY) (OUTPATIENT)
Dept: NEUROLOGY | Facility: CLINIC | Age: 54
End: 2024-12-05
Payer: COMMERCIAL

## 2024-12-17 DIAGNOSIS — E11.9 TYPE 2 DIABETES MELLITUS WITHOUT COMPLICATION, WITHOUT LONG-TERM CURRENT USE OF INSULIN: ICD-10-CM

## 2024-12-17 DIAGNOSIS — I10 PRIMARY HYPERTENSION: ICD-10-CM

## 2024-12-18 RX ORDER — LISINOPRIL 20 MG/1
20 TABLET ORAL DAILY
Qty: 90 TABLET | Refills: 1 | Status: SHIPPED | OUTPATIENT
Start: 2024-12-18

## 2024-12-20 ENCOUNTER — LAB (OUTPATIENT)
Dept: LAB | Facility: HOSPITAL | Age: 54
End: 2024-12-20
Payer: COMMERCIAL

## 2024-12-20 PROCEDURE — 85025 COMPLETE CBC W/AUTO DIFF WBC: CPT | Performed by: NURSE PRACTITIONER

## 2024-12-23 DIAGNOSIS — D69.6 THROMBOCYTOPENIA: Primary | ICD-10-CM

## 2024-12-24 ENCOUNTER — APPOINTMENT (OUTPATIENT)
Dept: CT IMAGING | Facility: HOSPITAL | Age: 54
End: 2024-12-24
Payer: COMMERCIAL

## 2024-12-24 ENCOUNTER — APPOINTMENT (OUTPATIENT)
Dept: GENERAL RADIOLOGY | Facility: HOSPITAL | Age: 54
End: 2024-12-24
Payer: COMMERCIAL

## 2024-12-24 ENCOUNTER — HOSPITAL ENCOUNTER (OUTPATIENT)
Facility: HOSPITAL | Age: 54
Setting detail: OBSERVATION
Discharge: HOME | End: 2024-12-25
Attending: EMERGENCY MEDICINE | Admitting: EMERGENCY MEDICINE
Payer: COMMERCIAL

## 2024-12-24 DIAGNOSIS — R06.02 SHORTNESS OF BREATH: Primary | ICD-10-CM

## 2024-12-24 DIAGNOSIS — D69.6 THROMBOCYTOPENIA: ICD-10-CM

## 2024-12-24 DIAGNOSIS — R00.2 PALPITATIONS: ICD-10-CM

## 2024-12-24 DIAGNOSIS — R07.9 CHEST PAIN, UNSPECIFIED TYPE: ICD-10-CM

## 2024-12-24 LAB
ALBUMIN SERPL-MCNC: 4.3 G/DL (ref 3.5–5.2)
ALBUMIN/GLOB SERPL: 1.7 G/DL
ALP SERPL-CCNC: 49 U/L (ref 39–117)
ALT SERPL W P-5'-P-CCNC: 20 U/L (ref 1–41)
ANION GAP SERPL CALCULATED.3IONS-SCNC: 8.5 MMOL/L (ref 5–15)
APTT PPP: 35.5 SECONDS (ref 22.7–35.4)
AST SERPL-CCNC: 29 U/L (ref 1–40)
B PARAPERT DNA SPEC QL NAA+PROBE: NOT DETECTED
B PERT DNA SPEC QL NAA+PROBE: NOT DETECTED
BASOPHILS # BLD AUTO: 0.02 10*3/MM3 (ref 0–0.2)
BASOPHILS NFR BLD AUTO: 0.3 % (ref 0–1.5)
BILIRUB SERPL-MCNC: 0.6 MG/DL (ref 0–1.2)
BUN SERPL-MCNC: 18 MG/DL (ref 6–20)
BUN/CREAT SERPL: 20.7 (ref 7–25)
C PNEUM DNA NPH QL NAA+NON-PROBE: NOT DETECTED
CALCIUM SPEC-SCNC: 9.6 MG/DL (ref 8.6–10.5)
CHLORIDE SERPL-SCNC: 106 MMOL/L (ref 98–107)
CO2 SERPL-SCNC: 24.5 MMOL/L (ref 22–29)
CREAT SERPL-MCNC: 0.87 MG/DL (ref 0.76–1.27)
D DIMER PPP FEU-MCNC: 0.65 MCGFEU/ML (ref 0–0.52)
DEPRECATED RDW RBC AUTO: 43.7 FL (ref 37–54)
EGFRCR SERPLBLD CKD-EPI 2021: 103.8 ML/MIN/1.73
EOSINOPHIL # BLD AUTO: 0.09 10*3/MM3 (ref 0–0.4)
EOSINOPHIL NFR BLD AUTO: 1.1 % (ref 0.3–6.2)
ERYTHROCYTE [DISTWIDTH] IN BLOOD BY AUTOMATED COUNT: 12.8 % (ref 12.3–15.4)
FLUAV SUBTYP SPEC NAA+PROBE: NOT DETECTED
FLUBV RNA ISLT QL NAA+PROBE: NOT DETECTED
GEN 5 1HR TROPONIN T REFLEX: 13 NG/L
GLOBULIN UR ELPH-MCNC: 2.6 GM/DL
GLUCOSE SERPL-MCNC: 81 MG/DL (ref 65–99)
HADV DNA SPEC NAA+PROBE: NOT DETECTED
HCOV 229E RNA SPEC QL NAA+PROBE: NOT DETECTED
HCOV HKU1 RNA SPEC QL NAA+PROBE: NOT DETECTED
HCOV NL63 RNA SPEC QL NAA+PROBE: NOT DETECTED
HCOV OC43 RNA SPEC QL NAA+PROBE: NOT DETECTED
HCT VFR BLD AUTO: 39.5 % (ref 37.5–51)
HGB BLD-MCNC: 13.7 G/DL (ref 13–17.7)
HMPV RNA NPH QL NAA+NON-PROBE: NOT DETECTED
HPIV1 RNA ISLT QL NAA+PROBE: NOT DETECTED
HPIV2 RNA SPEC QL NAA+PROBE: NOT DETECTED
HPIV3 RNA NPH QL NAA+PROBE: NOT DETECTED
HPIV4 P GENE NPH QL NAA+PROBE: NOT DETECTED
IMM GRANULOCYTES # BLD AUTO: 0.01 10*3/MM3 (ref 0–0.05)
IMM GRANULOCYTES NFR BLD AUTO: 0.1 % (ref 0–0.5)
INR PPP: 1.11 (ref 0.9–1.1)
LYMPHOCYTES # BLD AUTO: 1.83 10*3/MM3 (ref 0.7–3.1)
LYMPHOCYTES NFR BLD AUTO: 23.2 % (ref 19.6–45.3)
M PNEUMO IGG SER IA-ACNC: NOT DETECTED
MCH RBC QN AUTO: 32.2 PG (ref 26.6–33)
MCHC RBC AUTO-ENTMCNC: 34.7 G/DL (ref 31.5–35.7)
MCV RBC AUTO: 92.9 FL (ref 79–97)
MONOCYTES # BLD AUTO: 0.71 10*3/MM3 (ref 0.1–0.9)
MONOCYTES NFR BLD AUTO: 9 % (ref 5–12)
NEUTROPHILS NFR BLD AUTO: 5.23 10*3/MM3 (ref 1.7–7)
NEUTROPHILS NFR BLD AUTO: 66.3 % (ref 42.7–76)
NRBC BLD AUTO-RTO: 0 /100 WBC (ref 0–0.2)
NT-PROBNP SERPL-MCNC: 665 PG/ML (ref 0–900)
PLAT MORPH BLD: NORMAL
PLATELET # BLD AUTO: 107 10*3/MM3 (ref 140–450)
PMV BLD AUTO: 11.3 FL (ref 6–12)
POTASSIUM SERPL-SCNC: 4.4 MMOL/L (ref 3.5–5.2)
PROT SERPL-MCNC: 6.9 G/DL (ref 6–8.5)
PROTHROMBIN TIME: 14.3 SECONDS (ref 11.7–14.2)
RBC # BLD AUTO: 4.25 10*6/MM3 (ref 4.14–5.8)
RBC MORPH BLD: NORMAL
RHINOVIRUS RNA SPEC NAA+PROBE: NOT DETECTED
RSV RNA NPH QL NAA+NON-PROBE: NOT DETECTED
SARS-COV-2 RNA RESP QL NAA+PROBE: NOT DETECTED
SODIUM SERPL-SCNC: 139 MMOL/L (ref 136–145)
TROPONIN T NUMERIC DELTA: -1 NG/L
TROPONIN T SERPL HS-MCNC: 14 NG/L
WBC MORPH BLD: NORMAL
WBC NRBC COR # BLD AUTO: 7.89 10*3/MM3 (ref 3.4–10.8)

## 2024-12-24 PROCEDURE — 25010000002 HYDROMORPHONE 1 MG/ML SOLUTION

## 2024-12-24 PROCEDURE — 71275 CT ANGIOGRAPHY CHEST: CPT

## 2024-12-24 PROCEDURE — G0378 HOSPITAL OBSERVATION PER HR: HCPCS

## 2024-12-24 PROCEDURE — 71045 X-RAY EXAM CHEST 1 VIEW: CPT

## 2024-12-24 PROCEDURE — 96374 THER/PROPH/DIAG INJ IV PUSH: CPT

## 2024-12-24 PROCEDURE — 25010000002 MORPHINE PER 10 MG: Performed by: NURSE PRACTITIONER

## 2024-12-24 PROCEDURE — 93005 ELECTROCARDIOGRAM TRACING: CPT | Performed by: EMERGENCY MEDICINE

## 2024-12-24 PROCEDURE — 85730 THROMBOPLASTIN TIME PARTIAL: CPT | Performed by: NURSE PRACTITIONER

## 2024-12-24 PROCEDURE — 84484 ASSAY OF TROPONIN QUANT: CPT | Performed by: NURSE PRACTITIONER

## 2024-12-24 PROCEDURE — 36415 COLL VENOUS BLD VENIPUNCTURE: CPT

## 2024-12-24 PROCEDURE — 0202U NFCT DS 22 TRGT SARS-COV-2: CPT | Performed by: NURSE PRACTITIONER

## 2024-12-24 PROCEDURE — 96375 TX/PRO/DX INJ NEW DRUG ADDON: CPT

## 2024-12-24 PROCEDURE — 99285 EMERGENCY DEPT VISIT HI MDM: CPT

## 2024-12-24 PROCEDURE — 25010000002 ONDANSETRON PER 1 MG: Performed by: NURSE PRACTITIONER

## 2024-12-24 PROCEDURE — 83880 ASSAY OF NATRIURETIC PEPTIDE: CPT | Performed by: NURSE PRACTITIONER

## 2024-12-24 PROCEDURE — 85379 FIBRIN DEGRADATION QUANT: CPT | Performed by: NURSE PRACTITIONER

## 2024-12-24 PROCEDURE — 85610 PROTHROMBIN TIME: CPT | Performed by: NURSE PRACTITIONER

## 2024-12-24 PROCEDURE — 93005 ELECTROCARDIOGRAM TRACING: CPT

## 2024-12-24 PROCEDURE — 25510000001 IOPAMIDOL PER 1 ML: Performed by: NURSE PRACTITIONER

## 2024-12-24 PROCEDURE — 80053 COMPREHEN METABOLIC PANEL: CPT | Performed by: NURSE PRACTITIONER

## 2024-12-24 PROCEDURE — 85007 BL SMEAR W/DIFF WBC COUNT: CPT | Performed by: NURSE PRACTITIONER

## 2024-12-24 PROCEDURE — 85025 COMPLETE CBC W/AUTO DIFF WBC: CPT | Performed by: NURSE PRACTITIONER

## 2024-12-24 RX ORDER — SODIUM CHLORIDE 0.9 % (FLUSH) 0.9 %
10 SYRINGE (ML) INJECTION AS NEEDED
Status: DISCONTINUED | OUTPATIENT
Start: 2024-12-24 | End: 2024-12-25 | Stop reason: HOSPADM

## 2024-12-24 RX ORDER — AMOXICILLIN 250 MG
2 CAPSULE ORAL 2 TIMES DAILY PRN
Status: DISCONTINUED | OUTPATIENT
Start: 2024-12-24 | End: 2024-12-25 | Stop reason: HOSPADM

## 2024-12-24 RX ORDER — IOPAMIDOL 755 MG/ML
100 INJECTION, SOLUTION INTRAVASCULAR
Status: COMPLETED | OUTPATIENT
Start: 2024-12-24 | End: 2024-12-24

## 2024-12-24 RX ORDER — GABAPENTIN 300 MG/1
300 CAPSULE ORAL 2 TIMES DAILY
COMMUNITY

## 2024-12-24 RX ORDER — ACETAMINOPHEN 325 MG/1
650 TABLET ORAL EVERY 4 HOURS PRN
Status: DISCONTINUED | OUTPATIENT
Start: 2024-12-24 | End: 2024-12-25 | Stop reason: HOSPADM

## 2024-12-24 RX ORDER — ONDANSETRON 4 MG/1
4 TABLET, ORALLY DISINTEGRATING ORAL EVERY 6 HOURS PRN
Status: DISCONTINUED | OUTPATIENT
Start: 2024-12-24 | End: 2024-12-25 | Stop reason: HOSPADM

## 2024-12-24 RX ORDER — POLYETHYLENE GLYCOL 3350 17 G/17G
17 POWDER, FOR SOLUTION ORAL DAILY PRN
Status: DISCONTINUED | OUTPATIENT
Start: 2024-12-24 | End: 2024-12-25 | Stop reason: HOSPADM

## 2024-12-24 RX ORDER — ACETAMINOPHEN 160 MG/5ML
650 SOLUTION ORAL EVERY 4 HOURS PRN
Status: DISCONTINUED | OUTPATIENT
Start: 2024-12-24 | End: 2024-12-25 | Stop reason: HOSPADM

## 2024-12-24 RX ORDER — ACETAMINOPHEN 650 MG/1
650 SUPPOSITORY RECTAL EVERY 4 HOURS PRN
Status: DISCONTINUED | OUTPATIENT
Start: 2024-12-24 | End: 2024-12-25 | Stop reason: HOSPADM

## 2024-12-24 RX ORDER — ROSUVASTATIN CALCIUM 10 MG/1
10 TABLET, COATED ORAL NIGHTLY
Status: DISCONTINUED | OUTPATIENT
Start: 2024-12-24 | End: 2024-12-25

## 2024-12-24 RX ORDER — HYDROCODONE BITARTRATE AND ACETAMINOPHEN 5; 325 MG/1; MG/1
1 TABLET ORAL EVERY 6 HOURS PRN
Status: DISCONTINUED | OUTPATIENT
Start: 2024-12-24 | End: 2024-12-25 | Stop reason: HOSPADM

## 2024-12-24 RX ORDER — ENOXAPARIN SODIUM 100 MG/ML
40 INJECTION SUBCUTANEOUS DAILY
Status: DISCONTINUED | OUTPATIENT
Start: 2024-12-24 | End: 2024-12-25 | Stop reason: HOSPADM

## 2024-12-24 RX ORDER — ASPIRIN 325 MG
325 TABLET ORAL ONCE
Status: COMPLETED | OUTPATIENT
Start: 2024-12-24 | End: 2024-12-24

## 2024-12-24 RX ORDER — ONDANSETRON 2 MG/ML
4 INJECTION INTRAMUSCULAR; INTRAVENOUS ONCE
Status: COMPLETED | OUTPATIENT
Start: 2024-12-24 | End: 2024-12-24

## 2024-12-24 RX ORDER — BISACODYL 5 MG/1
5 TABLET, DELAYED RELEASE ORAL DAILY PRN
Status: DISCONTINUED | OUTPATIENT
Start: 2024-12-24 | End: 2024-12-25 | Stop reason: HOSPADM

## 2024-12-24 RX ORDER — ONDANSETRON 2 MG/ML
4 INJECTION INTRAMUSCULAR; INTRAVENOUS EVERY 6 HOURS PRN
Status: DISCONTINUED | OUTPATIENT
Start: 2024-12-24 | End: 2024-12-25 | Stop reason: HOSPADM

## 2024-12-24 RX ORDER — GABAPENTIN 300 MG/1
300 CAPSULE ORAL NIGHTLY
Status: DISCONTINUED | OUTPATIENT
Start: 2024-12-24 | End: 2024-12-25

## 2024-12-24 RX ORDER — SODIUM CHLORIDE 9 MG/ML
40 INJECTION, SOLUTION INTRAVENOUS AS NEEDED
Status: DISCONTINUED | OUTPATIENT
Start: 2024-12-24 | End: 2024-12-25 | Stop reason: HOSPADM

## 2024-12-24 RX ORDER — SODIUM CHLORIDE 0.9 % (FLUSH) 0.9 %
10 SYRINGE (ML) INJECTION EVERY 12 HOURS SCHEDULED
Status: DISCONTINUED | OUTPATIENT
Start: 2024-12-24 | End: 2024-12-25 | Stop reason: HOSPADM

## 2024-12-24 RX ORDER — BISACODYL 10 MG
10 SUPPOSITORY, RECTAL RECTAL DAILY PRN
Status: DISCONTINUED | OUTPATIENT
Start: 2024-12-24 | End: 2024-12-25 | Stop reason: HOSPADM

## 2024-12-24 RX ORDER — NITROGLYCERIN 0.4 MG/1
0.4 TABLET SUBLINGUAL
Status: DISCONTINUED | OUTPATIENT
Start: 2024-12-24 | End: 2024-12-25 | Stop reason: HOSPADM

## 2024-12-24 RX ADMIN — IOPAMIDOL 100 ML: 755 INJECTION, SOLUTION INTRAVENOUS at 17:00

## 2024-12-24 RX ADMIN — MORPHINE SULFATE 4 MG: 4 INJECTION, SOLUTION INTRAMUSCULAR; INTRAVENOUS at 16:09

## 2024-12-24 RX ADMIN — GABAPENTIN 300 MG: 300 CAPSULE ORAL at 20:45

## 2024-12-24 RX ADMIN — NITROGLYCERIN 0.4 MG: 0.4 TABLET SUBLINGUAL at 15:35

## 2024-12-24 RX ADMIN — ONDANSETRON 4 MG: 2 INJECTION INTRAMUSCULAR; INTRAVENOUS at 16:09

## 2024-12-24 RX ADMIN — ROSUVASTATIN CALCIUM 10 MG: 10 TABLET, FILM COATED ORAL at 20:45

## 2024-12-24 RX ADMIN — METFORMIN HYDROCHLORIDE 1000 MG: 500 TABLET ORAL at 18:23

## 2024-12-24 RX ADMIN — ASPIRIN 325 MG ORAL TABLET 325 MG: 325 PILL ORAL at 15:38

## 2024-12-24 RX ADMIN — HYDROCODONE BITARTRATE AND ACETAMINOPHEN 1 TABLET: 5; 325 TABLET ORAL at 20:51

## 2024-12-24 RX ADMIN — HYDROMORPHONE HYDROCHLORIDE 0.5 MG: 1 INJECTION, SOLUTION INTRAMUSCULAR; INTRAVENOUS; SUBCUTANEOUS at 17:11

## 2024-12-24 RX ADMIN — Medication 10 ML: at 20:46

## 2024-12-24 NOTE — ED PROVIDER NOTES
Subjective   History of Present Illness  Patient is a 54-year-old obese white male with a history of hypertension, hyperlipidemia, diabetes.  He presents today from home with complaints of chest tightness and pressure like someone is sitting on his chest.  Complains of pain into the middle of his back.  States he has not really felt well for the last 3 days.  His symptoms seem to be worse with exertion and better with rest.  States he has had some sweats at night.  Denies any fever or cough.  No leg pain or swelling.      Review of Systems   Constitutional:  Negative for fever.   Respiratory:  Positive for shortness of breath. Negative for cough.    Cardiovascular:  Positive for chest pain. Negative for leg swelling.   Musculoskeletal:  Positive for back pain.       Past Medical History:   Diagnosis Date   • Bilateral leg cramps     AT    • Colon polyps     FOLLOWED BY DR. SERA LE   • DDD (degenerative disc disease), lumbar 2018    L3,L4,L5   • Diabetes     TYPE 2, NIDDM   • Disorder of gallbladder    • Diverticulitis 09/2016    SIGMOID   • Diverticulitis of colon 04/18/2014   • Fatty liver    • GERD (gastroesophageal reflux disease)    • Hyperlipidemia    • Hypertension    • Hypertriglyceridemia    • Hypogonadism 02/07/2013   • Impotence of organic origin 02/22/2012   • Irritable bowel syndrome 01/14/2013   • Lumbar compression fracture     L4   • Lumbar radiculopathy    • Muscle spasm of left calf    • Numbness and tingling of foot     LEFT FOOT, S/P BACK SURGERY   • Obesity    • WOLF (obstructive sleep apnea)     cpap   • Plantar fasciitis 06/09/2016   • Polycythemia 02/22/2012   • Post laminectomy syndrome    • Right sided abdominal pain 02/28/2021    SEEN AT [facility] ER   • RUQ pain 05/02/2021    SEEN AT [facility] ER   • Spinal stenosis of lumbar region without neurogenic claudication 09/05/2018   • Splenomegaly 05/2021   • Spondylolisthesis        Allergies   Allergen Reactions   • Penicillin G Anaphylaxis        Past Surgical History:   Procedure Laterality Date   • ANAL FISSURECTOMY N/A 04/15/2009    DR.RAYMOND MICHAELS AT [facility]   • CHOLECYSTECTOMY  2022   • CHOLECYSTECTOMY WITH INTRAOPERATIVE CHOLANGIOGRAM N/A 06/23/2021    Procedure: CHOLECYSTECTOMY LAPAROSCOPIC INTRAOPERATIVE CHOLANGIOGRAM;  Surgeon: Chris Carrion Jr., MD;  Location: [facility];  Service: General;  Laterality: N/A;   • COLON SURGERY N/A 09/16/2016    SIGMOID COLECTOMY, D/T DIVERTICULITIS, DR. TYLER DUNBAR AT [facility]   • COLONOSCOPY N/A 04/15/2009    MIXED ADENOMATOUS POLYP IN RECTUM, DR. HERLINDA MICHAELS AT [facility]   • COLONOSCOPY N/A 01/28/2021    7 MM HYPERPLASTIC POLYP IN RECTUM, MULTIPLE SMALL AND LARGE DIVERTICULA IN ENTIRE COLON, RESCOPE IN 5 YRS, DR. SERA LE AT [facility]   • COLONOSCOPY W/ POLYPECTOMY N/A 12/11/2013    2 TUBULOVILLOUS ADENOMA POLYPS IN CECUM, 8 MM TUBULOVILLOUS ADENOMA POLYP IN ASCENDING, 14 MM TUBULOVILLOUS ADENOMA POLYP IN DISTAL ASCENDING, 5 MM TUBULAR ADENOMA POLYP IN ASCENDING, 5 MM BENIGN POLYP IN RECTUM, RESCOPE IN 2 YRS, DR. SERA LE AT [facility]   • ENDOSCOPY AND COLONOSCOPY N/A 2014   • HEMORRHOIDECTOMY N/A 04/15/2009    DR.RAYMOND MICHAELS AT [facility]   • HERNIA REPAIR Left 09/14/2017    lap left inguinal hernia repair, ventral hernia repair, DR. TYLER DUNBAR AT [facility]   • LUMBAR EPIDURAL INJECTION N/A     lumbar   • LUMBAR SPINE POSTERIOR N/A 12/03/2018    TLIF L3-L5, DR. MOHAMMAD MAJD AT [facility]    • TONSILLECTOMY Bilateral    • UPPER GASTROINTESTINAL ENDOSCOPY     • VENTRAL HERNIA REPAIR N/A 03/01/2018    LAPAROSCOPIC WITH PREVIOUS MESH REMOVAL, DR. TYLER DUNBAR AT [facility]       Family History   Problem Relation Age of Onset   • No Known Problems Mother    • Colon cancer Father    • Malig Hyperthermia Neg Hx    • Thyroid cancer Neg Hx    • Liver disease Neg Hx        Social History     Socioeconomic History   • Marital status:    • Number of children: 2   Tobacco Use   • Smoking status: Former     Current packs/day:  0.00     Average packs/day: 0.5 packs/day for 10.0 years (5.0 ttl pk-yrs)     Types: Cigarettes     Start date: 2002     Quit date: 2012     Years since quittin.9   • Smokeless tobacco: Current     Types: Chew   Vaping Use   • Vaping status: Never Used   Substance and Sexual Activity   • Alcohol use: Not Currently   • Drug use: Never   • Sexual activity: Not Currently     Partners: Female           Objective   Physical Exam  Vital signs and triage nurse note reviewed.  Constitutional: Awake, alert; well-developed and well-nourished. No acute distress is noted.  HEENT: Normocephalic, atraumatic; pupils are PERRL with intact EOM; oropharynx is pink and moist without exudate or erythema.  No drooling or pooling of oral secretions.  Neck: Supple  Cardiovascular: Regular rate and rhythm, normal S1-S2.  No murmur noted.  Pulmonary: Respiratory effort regular nonlabored, breath sounds clear to auscultation all fields.  Abdomen: Soft, nontender, nondistended with normoactive bowel sounds; no rebound or guarding.  Musculoskeletal: Independent range of motion of all extremities with no palpable tenderness or edema.   Skin: Flesh tone, warm, dry, intact; no erythematous or petechial rash or lesion.    Procedures           ED Course  ED Course as of 24 0654   Tue Dec 24, 2024   1624 Care turned over to KENDALL Olivia pending BNP and CT results and disposition with plans for admission for cardio consult. [MD]   1625 Assumed care from Vicky NP pending CT and dispo plan. [EC]   1714 Upon reevaluation patient is reporting continuing back pain, chest pain returning, headache returning, was given more IV pain medication. [EC]   1753 CT abdomen pelvis independently interpreted by radiologist and reviewed by myself showing no pulmonary embolism, mild diffuse interstitial thickening, concerning for mild pulmonary edema, cirrhotic liver morphology.    Upon reevaluation patient is resting comfortably, discussed findings with  "patient and wife at bedside.  Educated patient that we will be placing him in observation for cardiology to see him in the morning.  Patient voiced understanding, agreeable with dispo plan.  Night medications were ordered.  Discussed this patient with Dr. Rebolledo who agrees with plan. [EC]      ED Course User Index  [EC] Ne Looney PA-C  [MD] Kirstin Blake APRN      Labs Reviewed   PROTIME-INR - Abnormal; Notable for the following components:       Result Value    Protime 14.3 (*)     INR 1.11 (*)     All other components within normal limits   APTT - Abnormal; Notable for the following components:    PTT 35.5 (*)     All other components within normal limits   D-DIMER, QUANTITATIVE - Abnormal; Notable for the following components:    D-Dimer, Quantitative 0.65 (*)     All other components within normal limits    Narrative:     According to the assay 's published package insert, a normal (<0.50 MCGFEU/mL) D-dimer result in conjunction with a non-high clinical probability assessment, excludes deep vein thrombosis (DVT) and pulmonary embolism (PE) with high sensitivity.    D-dimer values increase with age and this can make VTE exclusion of an older population difficult. To address this, the American College of Phys[employer], based on best available evidence and recent guidelines, recommends that clinicians use age-adjusted D-dimer thresholds in patients greater than 50 years of age with: a) a low probability of PE who do not meet all Pulmonary Embolism Rule Out Criteria, or b) in those with intermediate probability of PE.   The formula for an age-adjusted D-dimer cut-off is \"age/100\".  For example, a 60 year old patient would have an age-adjusted cut-off of 0.60 MCGFEU/mL and an 80 year old 0.80 MCGFEU/mL.   CBC WITH AUTO DIFFERENTIAL - Abnormal; Notable for the following components:    Platelets 107 (*)     All other components within normal limits   RESPIRATORY PANEL PCR W/ COVID-19 (SARS-COV-2), NP " SWAB IN UTM/VTP, 2 HR TAT - Normal    Narrative:     In the setting of a positive respiratory panel with a viral infection PLUS a negative procalcitonin without other underlying concern for bacterial infection, consider observing off antibiotics or discontinuation of antibiotics and continue supportive care. If the respiratory panel is positive for atypical bacterial infection (Bordetella pertussis, Chlamydophila pneumoniae, or Mycoplasma pneumoniae), consider antibiotic de-escalation to target atypical bacterial infection.   TROPONIN - Normal    Narrative:     High Sensitive Troponin T Reference Range:  <14.0 ng/L- Negative Female for AMI  <22.0 ng/L- Negative Male for AMI  >=14 - Abnormal Female indicating possible myocardial injury.  >=22 - Abnormal Male indicating possible myocardial injury.   Clinicians would have to utilize clinical acumen, EKG, Troponin, and serial changes to determine if it is an Acute Myocardial Infarction or myocardial injury due to an underlying chronic condition.        SCAN SLIDE - Normal    Narrative:     No visible platelet clumping.   COMPREHENSIVE METABOLIC PANEL    Narrative:     GFR Categories in Chronic Kidney Disease (CKD)      GFR Category          GFR (mL/min/1.73)    Interpretation  G1                     90 or greater         Normal or high (1)  G2                      60-89                Mild decrease (1)  G3a                   45-59                Mild to moderate decrease  G3b                   30-44                Moderate to severe decrease  G4                    15-29                Severe decrease  G5                    14 or less           Kidney failure          (1)In the absence of evidence of kidney disease, neither GFR category G1 or G2 fulfill the criteria for CKD.    eGFR calculation 2021 CKD-EPI creatinine equation, which does not include race as a factor   HIGH SENSITIVITIY TROPONIN T 1HR   BNP (IN-HOUSE)   CBC AND DIFFERENTIAL    Narrative:     The  following orders were created for panel order CBC & Differential.  Procedure                               Abnormality         Status                     ---------                               -----------         ------                     CBC Auto Differential[[id number]]        Abnormal            Final result               Scan Slide[[id number]]                   Normal              Final result                 Please view results for these tests on the individual orders.     XR Chest 1 View    Result Date: 12/24/2024  Impression: Mild cardiac prominence with pulmonary vascular congestion and mixed interstitial and airspace opacities suggestive of pulmonary edema/CHF. Electronically Signed: Ana Maria Kidd MD  12/24/2024 3:29 PM EST  Workstation ID: [id number]   Medications   sodium chloride 0.9 % flush 10 mL (has no administration in time range)   nitroglycerin (NITROSTAT) SL tablet 0.4 mg (0.4 mg Sublingual Given 12/24/24 1535)   aspirin tablet 325 mg (325 mg Oral Given 12/24/24 1538)   morphine injection 4 mg (4 mg Intravenous Given 12/24/24 1609)   ondansetron (ZOFRAN) injection 4 mg (4 mg Intravenous Given 12/24/24 1609)                                                      Medical Decision Making  Patient presents today with the above complaint.    He had the above exam and evaluation.  He is placed on continuous cardiac monitor.  IV was established.  Labs EKG and chest x-ray were obtained.  He was given an aspirin and some nitroglycerin as well as morphine and Zofran.  He does report that his chest pressure was relieved with the nitroglycerin.  He still complains of pain in the mid back.    Workup: EKG was independently interpreted by Dr. Rebolledo and reviewed by myself and shows sinus rhythm with ventricular rate of 73, PVC, no acute ST or T wave changes.  D-dimer mildly elevated at 0.65.  Chest x-ray shows mild cardiac prominence with pulmonary vascular congestion and mixed interstitial and airspace opacities  suggestive of pulmonary edema/CHF.  CT PE protocol was ordered.    Problems Addressed:  Chest pain, unspecified type: complicated acute illness or injury  Shortness of breath: complicated acute illness or injury    Amount and/or Complexity of Data Reviewed  Labs: ordered.  Radiology: ordered.    Risk  OTC drugs.  Prescription drug management.        Final diagnoses:   Shortness of breath   Chest pain, unspecified type       ED Disposition  ED Disposition       ED Disposition   Intended Admit    Condition   --    Comment   --               No follow-up provider specified.       Medication List        ASK your doctor about these medications      gabapentin 300 MG capsule  Commonly known as: NEURONTIN  Ask about: Which instructions should I use?                 Kirstin Blake, APRN  12/26/24 0654

## 2024-12-25 ENCOUNTER — APPOINTMENT (OUTPATIENT)
Dept: RESPIRATORY THERAPY | Facility: HOSPITAL | Age: 54
End: 2024-12-25
Payer: COMMERCIAL

## 2024-12-25 ENCOUNTER — READMISSION MANAGEMENT (OUTPATIENT)
Dept: CALL CENTER | Facility: HOSPITAL | Age: 54
End: 2024-12-25
Payer: COMMERCIAL

## 2024-12-25 VITALS
TEMPERATURE: 97.7 F | OXYGEN SATURATION: 93 % | SYSTOLIC BLOOD PRESSURE: 145 MMHG | HEIGHT: 73 IN | RESPIRATION RATE: 16 BRPM | DIASTOLIC BLOOD PRESSURE: 85 MMHG | WEIGHT: 288.58 LBS | BODY MASS INDEX: 38.25 KG/M2 | HEART RATE: 65 BPM

## 2024-12-25 LAB
ANION GAP SERPL CALCULATED.3IONS-SCNC: 7.4 MMOL/L (ref 5–15)
BASOPHILS # BLD AUTO: 0.03 10*3/MM3 (ref 0–0.2)
BASOPHILS NFR BLD AUTO: 0.4 % (ref 0–1.5)
BUN SERPL-MCNC: 17 MG/DL (ref 6–20)
BUN/CREAT SERPL: 21.8 (ref 7–25)
CALCIUM SPEC-SCNC: 8.9 MG/DL (ref 8.6–10.5)
CHLORIDE SERPL-SCNC: 106 MMOL/L (ref 98–107)
CO2 SERPL-SCNC: 24.6 MMOL/L (ref 22–29)
CREAT SERPL-MCNC: 0.78 MG/DL (ref 0.76–1.27)
DEPRECATED RDW RBC AUTO: 43.8 FL (ref 37–54)
EGFRCR SERPLBLD CKD-EPI 2021: 107.3 ML/MIN/1.73
EOSINOPHIL # BLD AUTO: 0.12 10*3/MM3 (ref 0–0.4)
EOSINOPHIL NFR BLD AUTO: 1.7 % (ref 0.3–6.2)
ERYTHROCYTE [DISTWIDTH] IN BLOOD BY AUTOMATED COUNT: 12.8 % (ref 12.3–15.4)
GLUCOSE BLDC GLUCOMTR-MCNC: 81 MG/DL (ref 70–105)
GLUCOSE SERPL-MCNC: 82 MG/DL (ref 65–99)
HCT VFR BLD AUTO: 37.5 % (ref 37.5–51)
HGB BLD-MCNC: 12.9 G/DL (ref 13–17.7)
IMM GRANULOCYTES # BLD AUTO: 0.02 10*3/MM3 (ref 0–0.05)
IMM GRANULOCYTES NFR BLD AUTO: 0.3 % (ref 0–0.5)
LYMPHOCYTES # BLD AUTO: 1.8 10*3/MM3 (ref 0.7–3.1)
LYMPHOCYTES NFR BLD AUTO: 25.7 % (ref 19.6–45.3)
MCH RBC QN AUTO: 32.3 PG (ref 26.6–33)
MCHC RBC AUTO-ENTMCNC: 34.4 G/DL (ref 31.5–35.7)
MCV RBC AUTO: 94 FL (ref 79–97)
MONOCYTES # BLD AUTO: 0.73 10*3/MM3 (ref 0.1–0.9)
MONOCYTES NFR BLD AUTO: 10.4 % (ref 5–12)
NEUTROPHILS NFR BLD AUTO: 4.31 10*3/MM3 (ref 1.7–7)
NEUTROPHILS NFR BLD AUTO: 61.5 % (ref 42.7–76)
NRBC BLD AUTO-RTO: 0 /100 WBC (ref 0–0.2)
PLATELET # BLD AUTO: 85 10*3/MM3 (ref 140–450)
PMV BLD AUTO: 11.8 FL (ref 6–12)
POTASSIUM SERPL-SCNC: 4 MMOL/L (ref 3.5–5.2)
RBC # BLD AUTO: 3.99 10*6/MM3 (ref 4.14–5.8)
SODIUM SERPL-SCNC: 138 MMOL/L (ref 136–145)
TROPONIN T SERPL HS-MCNC: 12 NG/L
WBC NRBC COR # BLD AUTO: 7.01 10*3/MM3 (ref 3.4–10.8)

## 2024-12-25 PROCEDURE — G0378 HOSPITAL OBSERVATION PER HR: HCPCS

## 2024-12-25 PROCEDURE — 85025 COMPLETE CBC W/AUTO DIFF WBC: CPT | Performed by: EMERGENCY MEDICINE

## 2024-12-25 PROCEDURE — 25010000002 HYDROMORPHONE PER 4 MG: Performed by: PHYSICIAN ASSISTANT

## 2024-12-25 PROCEDURE — 96376 TX/PRO/DX INJ SAME DRUG ADON: CPT

## 2024-12-25 PROCEDURE — 25010000002 FUROSEMIDE PER 20 MG: Performed by: NURSE PRACTITIONER

## 2024-12-25 PROCEDURE — 94660 CPAP INITIATION&MGMT: CPT

## 2024-12-25 PROCEDURE — 84484 ASSAY OF TROPONIN QUANT: CPT | Performed by: NURSE PRACTITIONER

## 2024-12-25 PROCEDURE — 96375 TX/PRO/DX INJ NEW DRUG ADDON: CPT

## 2024-12-25 PROCEDURE — 25010000002 KETOROLAC TROMETHAMINE PER 15 MG: Performed by: PSYCHIATRY & NEUROLOGY

## 2024-12-25 PROCEDURE — 94799 UNLISTED PULMONARY SVC/PX: CPT

## 2024-12-25 PROCEDURE — 94761 N-INVAS EAR/PLS OXIMETRY MLT: CPT

## 2024-12-25 PROCEDURE — 80048 BASIC METABOLIC PNL TOTAL CA: CPT | Performed by: EMERGENCY MEDICINE

## 2024-12-25 PROCEDURE — 82948 REAGENT STRIP/BLOOD GLUCOSE: CPT | Performed by: PHYSICIAN ASSISTANT

## 2024-12-25 RX ORDER — NICOTINE POLACRILEX 4 MG
15 LOZENGE BUCCAL
Status: DISCONTINUED | OUTPATIENT
Start: 2024-12-25 | End: 2024-12-25 | Stop reason: HOSPADM

## 2024-12-25 RX ORDER — ASPIRIN 81 MG/1
81 TABLET ORAL DAILY
Qty: 30 TABLET | Refills: 0 | Status: SHIPPED | OUTPATIENT
Start: 2024-12-25

## 2024-12-25 RX ORDER — HYDROMORPHONE HYDROCHLORIDE 1 MG/ML
0.5 INJECTION, SOLUTION INTRAMUSCULAR; INTRAVENOUS; SUBCUTANEOUS ONCE AS NEEDED
Status: COMPLETED | OUTPATIENT
Start: 2024-12-25 | End: 2024-12-25

## 2024-12-25 RX ORDER — ALBUTEROL SULFATE 0.83 MG/ML
2.5 SOLUTION RESPIRATORY (INHALATION) EVERY 6 HOURS PRN
Status: DISCONTINUED | OUTPATIENT
Start: 2024-12-25 | End: 2024-12-25 | Stop reason: HOSPADM

## 2024-12-25 RX ORDER — LIDOCAINE 4 G/G
1 PATCH TOPICAL
Status: DISCONTINUED | OUTPATIENT
Start: 2024-12-25 | End: 2024-12-25 | Stop reason: HOSPADM

## 2024-12-25 RX ORDER — FUROSEMIDE 20 MG/1
20 TABLET ORAL DAILY
Qty: 30 TABLET | Refills: 0 | Status: SHIPPED | OUTPATIENT
Start: 2024-12-25 | End: 2025-01-24

## 2024-12-25 RX ORDER — ASPIRIN 81 MG/1
81 TABLET ORAL DAILY
Status: DISCONTINUED | OUTPATIENT
Start: 2024-12-25 | End: 2024-12-25 | Stop reason: HOSPADM

## 2024-12-25 RX ORDER — NEBIVOLOL 5 MG/1
5 TABLET ORAL DAILY
Status: DISCONTINUED | OUTPATIENT
Start: 2024-12-25 | End: 2024-12-25 | Stop reason: HOSPADM

## 2024-12-25 RX ORDER — PANTOPRAZOLE SODIUM 40 MG/1
40 TABLET, DELAYED RELEASE ORAL DAILY
Status: DISCONTINUED | OUTPATIENT
Start: 2024-12-25 | End: 2024-12-25 | Stop reason: HOSPADM

## 2024-12-25 RX ORDER — ISOSORBIDE MONONITRATE 30 MG/1
30 TABLET, EXTENDED RELEASE ORAL
Status: DISCONTINUED | OUTPATIENT
Start: 2024-12-25 | End: 2024-12-25 | Stop reason: HOSPADM

## 2024-12-25 RX ORDER — ISOSORBIDE MONONITRATE 30 MG/1
30 TABLET, EXTENDED RELEASE ORAL
Qty: 30 TABLET | Refills: 0 | Status: SHIPPED | OUTPATIENT
Start: 2024-12-25 | End: 2025-01-24

## 2024-12-25 RX ORDER — LISINOPRIL 20 MG/1
20 TABLET ORAL DAILY
Status: DISCONTINUED | OUTPATIENT
Start: 2024-12-25 | End: 2024-12-25 | Stop reason: HOSPADM

## 2024-12-25 RX ORDER — IBUPROFEN 600 MG/1
1 TABLET ORAL
Status: DISCONTINUED | OUTPATIENT
Start: 2024-12-25 | End: 2024-12-25 | Stop reason: HOSPADM

## 2024-12-25 RX ORDER — ROSUVASTATIN CALCIUM 10 MG/1
10 TABLET, COATED ORAL NIGHTLY
Status: DISCONTINUED | OUTPATIENT
Start: 2024-12-25 | End: 2024-12-25 | Stop reason: HOSPADM

## 2024-12-25 RX ORDER — FUROSEMIDE 10 MG/ML
20 INJECTION INTRAMUSCULAR; INTRAVENOUS ONCE
Status: COMPLETED | OUTPATIENT
Start: 2024-12-25 | End: 2024-12-25

## 2024-12-25 RX ORDER — INSULIN LISPRO 100 [IU]/ML
2-9 INJECTION, SOLUTION INTRAVENOUS; SUBCUTANEOUS
Status: DISCONTINUED | OUTPATIENT
Start: 2024-12-25 | End: 2024-12-25 | Stop reason: HOSPADM

## 2024-12-25 RX ORDER — KETOROLAC TROMETHAMINE 30 MG/ML
15 INJECTION, SOLUTION INTRAMUSCULAR; INTRAVENOUS ONCE
Status: COMPLETED | OUTPATIENT
Start: 2024-12-25 | End: 2024-12-25

## 2024-12-25 RX ORDER — DEXTROSE MONOHYDRATE 25 G/50ML
25 INJECTION, SOLUTION INTRAVENOUS
Status: DISCONTINUED | OUTPATIENT
Start: 2024-12-25 | End: 2024-12-25 | Stop reason: HOSPADM

## 2024-12-25 RX ORDER — GABAPENTIN 300 MG/1
300 CAPSULE ORAL 2 TIMES DAILY
Status: DISCONTINUED | OUTPATIENT
Start: 2024-12-25 | End: 2024-12-25 | Stop reason: HOSPADM

## 2024-12-25 RX ADMIN — PANTOPRAZOLE SODIUM 40 MG: 40 TABLET, DELAYED RELEASE ORAL at 10:13

## 2024-12-25 RX ADMIN — HYDROMORPHONE HYDROCHLORIDE 0.5 MG: 1 INJECTION, SOLUTION INTRAMUSCULAR; INTRAVENOUS; SUBCUTANEOUS at 08:19

## 2024-12-25 RX ADMIN — LIDOCAINE PAIN RELIEF 1 PATCH: 560 PATCH TOPICAL at 08:20

## 2024-12-25 RX ADMIN — BISACODYL 5 MG: 5 TABLET, COATED ORAL at 04:03

## 2024-12-25 RX ADMIN — KETOROLAC TROMETHAMINE 15 MG: 30 INJECTION, SOLUTION INTRAMUSCULAR; INTRAVENOUS at 06:17

## 2024-12-25 RX ADMIN — GABAPENTIN 300 MG: 300 CAPSULE ORAL at 10:13

## 2024-12-25 RX ADMIN — ASPIRIN 81 MG: 81 TABLET, COATED ORAL at 11:48

## 2024-12-25 RX ADMIN — NEBIVOLOL 5 MG: 5 TABLET ORAL at 10:13

## 2024-12-25 RX ADMIN — ISOSORBIDE MONONITRATE 30 MG: 30 TABLET, EXTENDED RELEASE ORAL at 11:48

## 2024-12-25 RX ADMIN — LISINOPRIL 20 MG: 20 TABLET ORAL at 10:13

## 2024-12-25 RX ADMIN — FUROSEMIDE 20 MG: 10 INJECTION, SOLUTION INTRAMUSCULAR; INTRAVENOUS at 11:48

## 2024-12-25 RX ADMIN — HYDROCODONE BITARTRATE AND ACETAMINOPHEN 1 TABLET: 5; 325 TABLET ORAL at 04:03

## 2024-12-25 RX ADMIN — Medication 10 ML: at 08:20

## 2024-12-25 NOTE — CONSULTS
[facility] CARDIOLOGY CONSULT  [facility]        Subjective:     Encounter Date:12/24/2024      Patient ID: Qasim Ch is a 54 y.o. male.    Chief Complaint: Chest Pain      HPI:  Qasim Ch is a 54 y.o. male patient has no prior cardiac history.  He underwent stress echo 9/2024 which showed an EF of 55% with mild MR and no ischemia.  He exercised for 7:30 minutes Jarred protocol.  PMH includes WOLF, IBS, GERD.  Patient presents with chest pain and cardiology consulted for further evaluation and management.    Patient tells me that his stress echo this fall was prompted by evaluation for PVCs by his PCP.  He has not worn an ambulatory cardiac monitor yet.  His CT chest imaging today suggest cirrhotic liver.  Patient tells me that he was seen by gastroenterologist last year and underwent a liver biopsy which is reported as okay.  He has never had evaluation for low platelets.    Yesterday patient awoke in bed with midsternal chest heaviness described as an elephant on his chest radiating through to his back and accompanied by shortness of breath.  He reports relief with nitroglycerin in the ER.  This has never happened before but he does have chronic back pain.  He states this is different than his typical back pain.  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  He denies recent shortness of breath, dizziness, or palpitations.      Past Medical History:   Diagnosis Date    Bilateral leg cramps     AT HS    Colon polyps     FOLLOWED BY DR. SERA LE    DDD (degenerative disc disease), lumbar 2018    L3,L4,L5    Diabetes     TYPE 2, NIDDM    Disorder of gallbladder     Diverticulitis 09/2016    SIGMOID    Diverticulitis of colon 04/18/2014    Fatty liver     GERD (gastroesophageal reflux disease)     Hyperlipidemia     Hypertension     Hypertriglyceridemia     Hypogonadism 02/07/2013    Impotence of organic origin  02/22/2012    Irritable bowel syndrome 01/14/2013    Lumbar compression fracture     L4    Lumbar radiculopathy     Muscle spasm of left calf     Numbness and tingling of foot     LEFT FOOT, S/P BACK SURGERY    Obesity     WOLF (obstructive sleep apnea)     cpap    Plantar fasciitis 06/09/2016    Polycythemia 02/22/2012    Post laminectomy syndrome     Right sided abdominal pain 02/28/2021    SEEN AT [facility] ER    RUQ pain 05/02/2021    SEEN AT [facility] ER    Spinal stenosis of lumbar region without neurogenic claudication 09/05/2018    Splenomegaly 05/2021    Spondylolisthesis          Past Surgical History:   Procedure Laterality Date    ANAL FISSURECTOMY N/A 04/15/2009    DR.RAYMOND MICHAELS AT [facility]    CHOLECYSTECTOMY  2022    CHOLECYSTECTOMY WITH INTRAOPERATIVE CHOLANGIOGRAM N/A 06/23/2021    Procedure: CHOLECYSTECTOMY LAPAROSCOPIC INTRAOPERATIVE CHOLANGIOGRAM;  Surgeon: Chris Carrion Jr., MD;  Location: [facility] OR;  Service: General;  Laterality: N/A;    COLON SURGERY N/A 09/16/2016    SIGMOID COLECTOMY, D/T DIVERTICULITIS, DR. TYLER DUNBAR AT [facility]    COLONOSCOPY N/A 04/15/2009    MIXED ADENOMATOUS POLYP IN RECTUM, DR. HERLINDA MICHAELS AT [facility]    COLONOSCOPY N/A 01/28/2021    7 MM HYPERPLASTIC POLYP IN RECTUM, MULTIPLE SMALL AND LARGE DIVERTICULA IN ENTIRE COLON, RESCOPE IN 5 YRS, DR. SERA LE AT [facility]    COLONOSCOPY W/ POLYPECTOMY N/A 12/11/2013    2 TUBULOVILLOUS ADENOMA POLYPS IN CECUM, 8 MM TUBULOVILLOUS ADENOMA POLYP IN ASCENDING, 14 MM TUBULOVILLOUS ADENOMA POLYP IN DISTAL ASCENDING, 5 MM TUBULAR ADENOMA POLYP IN ASCENDING, 5 MM BENIGN POLYP IN RECTUM, RESCOPE IN 2 YRS, DR. SERA LE AT [facility]    ENDOSCOPY AND COLONOSCOPY N/A 2014    HEMORRHOIDECTOMY N/A 04/15/2009    DR.RAYMOND MICHAELS AT [facility]    HERNIA REPAIR Left 09/14/2017    lap left inguinal hernia repair, ventral hernia repair, DR. TYLER DUNBAR AT [facility]    LUMBAR EPIDURAL INJECTION N/A     lumbar    LUMBAR SPINE POSTERIOR N/A 12/03/2018    TLIF L3-L5,   "MIKE ZHANG AT [facility]     TONSILLECTOMY Bilateral     UPPER GASTROINTESTINAL ENDOSCOPY      VENTRAL HERNIA REPAIR N/A 2018    LAPAROSCOPIC WITH PREVIOUS MESH REMOVAL, DR. TYLER DUNBAR AT [facility]         Social History     Socioeconomic History    Marital status:     Number of children: 2   Tobacco Use    Smoking status: Former     Current packs/day: 0.00     Average packs/day: 0.5 packs/day for 10.0 years (5.0 ttl pk-yrs)     Types: Cigarettes     Start date: 2002     Quit date: 2012     Years since quittin.9    Smokeless tobacco: Current     Types: Chew   Vaping Use    Vaping status: Never Used   Substance and Sexual Activity    Alcohol use: Not Currently    Drug use: Never    Sexual activity: Not Currently     Partners: Female   Quit smoking 1.5 years ago    Family History   Problem Relation Age of Onset    No Known Problems Mother     Colon cancer Father     Malig Hyperthermia Neg Hx     Thyroid cancer Neg Hx     Liver disease Neg Hx          Allergies   Allergen Reactions    Penicillin G Anaphylaxis       Current Medications:   Scheduled Meds:enoxaparin, 40 mg, Subcutaneous, Daily  gabapentin, 300 mg, Oral, BID  insulin lispro, 2-9 Units, Subcutaneous, 4x Daily AC & at Bedtime  Lidocaine, 1 patch, Transdermal, Q24H  lisinopril, 20 mg, Oral, Daily  nebivolol, 5 mg, Oral, Daily  pantoprazole, 40 mg, Oral, Daily  rosuvastatin, 10 mg, Oral, Nightly  sodium chloride, 10 mL, Intravenous, Q12H      Continuous Infusions:     ROS  All other systems reviewed and are negative.       Objective:         /79 (BP Location: Left arm, Patient Position: Lying)   Pulse 59   Temp 97.7 °F (36.5 °C) (Oral)   Resp 16   Ht 185.4 cm (73\")   Wt 131 kg (288 lb 9.3 oz)   SpO2 95%   BMI 38.07 kg/m²       General: Well-developed in NAD.  Neuro: AAOx3. No gross deficits.  HEENT: Sclerae clear, no xanthelasmas.  CV: S1S2, RRR. No murmurs or gallops.  Resp: Breathing is unlabored. Lungs CTA " "throughout.  GI: BS+. Abdomen obese.  Ext: Pedal pulses are palpable. Extremities are nonedematous.  MS: moves all extremities, no weakness, TTP left lower back  Skin: warm, dry.  Psych: calm and cooperative.            Lab Review:     Results from last 7 days   Lab Units 12/25/24  0357 12/24/24  1527   SODIUM mmol/L 138 139   POTASSIUM mmol/L 4.0 4.4   CHLORIDE mmol/L 106 106   CO2 mmol/L 24.6 24.5   BUN mg/dL 17 18   CREATININE mg/dL 0.78 0.87   GLUCOSE mg/dL 82 81   CALCIUM mg/dL 8.9 9.6   AST (SGOT) U/L  --  29   ALT (SGPT) U/L  --  20     Results from last 7 days   Lab Units 12/24/24  1702 12/24/24  1527   HSTROP T ng/L 13 14     Results from last 7 days   Lab Units 12/25/24  0357 12/24/24  1527   WBC 10*3/mm3 7.01 7.89   HEMOGLOBIN g/dL 12.9* 13.7   HEMATOCRIT % 37.5 39.5   PLATELETS 10*3/mm3 85* 107*     Results from last 7 days   Lab Units 12/24/24  1527   INR  1.11*   APTT seconds 35.5*               Invalid input(s): \"LDLCALC\"  Results from last 7 days   Lab Units 12/24/24  1527   PROBNP pg/mL 665.0           Recent Radiology:  Imaging Results (Most Recent)       Procedure Component Value Units Date/Time    CT Angiogram Chest Pulmonary Embolism [[id number]] Collected: 12/24/24 1713     Updated: 12/24/24 1721    Narrative:      CT ANGIOGRAM CHEST PULMONARY EMBOLISM    Date of Exam: 12/24/2024 5:00 PM EST    Indication: cp/soa/elevated dimer/abnormal cxr.    Comparison: 5/2/2021    Technique: Axial CT images were obtained of the chest after the uneventful intravenous administration of iodinated contrast utilizing pulmonary embolism protocol.  Sagittal and coronal reconstructions were performed.  Automated exposure control and   iterative reconstruction methods were used.      Findings:    Pulmonary arteries:Adequate opacification of the pulmonary arteries. No evidence of acute pulmonary embolism.    Aorta: Unremarkable.    Lungs and Pleura: Mild interstitial thickening within the lung bases. Otherwise the " lungs are clear. No pleural effusion or pneumothorax.    Mediastinum/Judy: No lymphadenopathy. No suspicious mass.    Heart: Normal in size. No pericardial effusion. Normal RV/LV ratio.    Soft Tissue: Unremarkable.      Upper Abdomen: Findings suggestive of a cirrhotic liver. Otherwise unremarkable    Bones: No acute osseous abnormality.        Impression:      Impression:  No pulmonary embolism.    Mild diffuse interstitial thickening, most significantly within the dependent portions of the lungs, is concerning for mild pulmonary edema.    Cirrhotic liver morphology.        Electronically Signed: Sandoval Thomas DO    12/24/2024 5:19 PM EST    Workstation ID: [id number]    XR Chest 1 View [[id number]] Collected: 12/24/24 1525     Updated: 12/24/24 1531    Narrative:      XR CHEST 1 VW    Date of Exam: 12/24/2024 3:24 PM EST    Indication: cp    Comparison: 11/23/2018    Findings:  Cardiomediastinal silhouette is prominent. There is pulmonary vascular congestion with bilateral interstitial and airspace opacities. No pleural effusion or pneumothorax.      Impression:      Impression:  Mild cardiac prominence with pulmonary vascular congestion and mixed interstitial and airspace opacities suggestive of pulmonary edema/CHF.      Electronically Signed: Ana Maria Kidd MD    12/24/2024 3:29 PM EST    Workstation ID: [id number]              ECHOCARDIOGRAM:    Results for orders placed during the hospital encounter of 09/06/24    Adult Stress Echo W/ Cont or Stress Agent if Necessary Per Protocol    Interpretation Summary    Normal stress echo with no significant echocardiographic evidence for myocardial ischemia.    Findings consistent with a normal ECG stress test. PVC frequency improved with exercise..    Left ventricular systolic function is normal. Calculated left ventricular EF = 55.2% Normal left ventricular cavity size and wall thickness noted. All left ventricular wall segments contract normally. Left ventricular  diastolic function was normal.            Assessment:         Active Hospital Problems    Diagnosis  POA    **Chest pain [R07.9]  Yes     1) Chest Pain  -High-sensitivity troponin negative x 3  -EKG shows no acute ST abnormality  -CXR shows mild vascular congestion  -  -CTA of the chest is negative for PE but suggest cirrhotic liver.  - Stress echo 9/2024 showed an EF of 55% with mild MR and no ischemia.     2) WOLF    3) IBS    4) GERD    5) TCP           Plan:   Patient has been ruled out for ACS.  Will give IV Lasix 20 mg x 1 now.  Will start Imdur and plan outpatient CT coronary angiogram.  Will tentatively plan to place ambulatory cardiac monitor for quantification of PVCs.  This will not be able to be performed today being the holiday.  Follow-up with Dr. Gonzalez in 2 weeks.  Case has been discussed with Dr. Gonzalez.         Electronically signed by DIMPLE Vázquez, 12/25/24, 10:39 AM EST.

## 2024-12-25 NOTE — OUTREACH NOTE
Prep Survey      Flowsheet Row Responses   [facility] patient discharged from? [place]   Is LACE score < 7 ? Yes   Eligibility [facility]   Date of Admission 12/24/24   Date of Discharge 12/25/24   Discharge diagnosis Chest pain   Does the patient have one of the following disease processes/diagnoses(primary or secondary)? Other   Prep survey completed? Yes            Sadaf SAUCEDO - Registered Nurse

## 2024-12-25 NOTE — PLAN OF CARE
Problem: Adult Inpatient Plan of Care  Goal: Plan of Care Review  12/25/2024 0603 by Denise Peguero RN  Outcome: Progressing  Flowsheets (Taken 12/25/2024 0603)  Progress: no change  Plan of Care Reviewed With: patient  12/24/2024 2142 by Denise Peguero RN  Outcome: Not Progressing  Flowsheets (Taken 12/24/2024 2142)  Progress: improving  Plan of Care Reviewed With: patient  Goal: Patient-Specific Goal (Individualized)  12/25/2024 0603 by Denise Peguero RN  Outcome: Progressing  12/24/2024 2142 by Denise Peguero RN  Outcome: Not Progressing  Goal: Absence of Hospital-Acquired Illness or Injury  12/25/2024 0603 by Denise Peguero RN  Outcome: Progressing  12/24/2024 2142 by Denise Peguero RN  Outcome: Not Progressing  Intervention: Identify and Manage Fall Risk  Recent Flowsheet Documentation  Taken 12/25/2024 0400 by Denise Peguero RN  Safety Promotion/Fall Prevention:   activity supervised   clutter free environment maintained   fall prevention program maintained   lighting adjusted   nonskid shoes/slippers when out of bed   room organization consistent   safety round/check completed  Taken 12/25/2024 0200 by Denise Peguero RN  Safety Promotion/Fall Prevention:   activity supervised   clutter free environment maintained   fall prevention program maintained   lighting adjusted   nonskid shoes/slippers when out of bed   room organization consistent   safety round/check completed  Taken 12/25/2024 0000 by Denise Peguero RN  Safety Promotion/Fall Prevention:   activity supervised   clutter free environment maintained   fall prevention program maintained   lighting adjusted   nonskid shoes/slippers when out of bed   room organization consistent   safety round/check completed  Taken 12/24/2024 2200 by Denise Peguero RN  Safety Promotion/Fall Prevention:   activity supervised   clutter free environment maintained   fall prevention program maintained   lighting adjusted   nonskid  shoes/slippers when out of bed   room organization consistent   safety round/check completed  Taken 12/24/2024 2100 by Denise Peguero RN  Safety Promotion/Fall Prevention:   activity supervised   clutter free environment maintained   fall prevention program maintained   nonskid shoes/slippers when out of bed   room organization consistent   safety round/check completed  Intervention: Prevent Skin Injury  Recent Flowsheet Documentation  Taken 12/25/2024 0400 by Denise Peguero RN  Body Position: position changed independently  Taken 12/25/2024 0200 by Denise Peguero RN  Body Position: position changed independently  Taken 12/25/2024 0000 by Denise Peguero RN  Body Position: position changed independently  Taken 12/24/2024 2200 by Denise Peguero RN  Body Position: position changed independently  Taken 12/24/2024 2100 by Denise Peguero RN  Body Position: position changed independently  Intervention: Prevent Infection  Recent Flowsheet Documentation  Taken 12/25/2024 0400 by Denise Peguero RN  Infection Prevention:   rest/sleep promoted   single patient room provided  Taken 12/25/2024 0200 by Denise Peguero RN  Infection Prevention:   rest/sleep promoted   single patient room provided  Taken 12/25/2024 0000 by Denise Peguero RN  Infection Prevention:   rest/sleep promoted   single patient room provided  Taken 12/24/2024 2200 by Denise Peguero RN  Infection Prevention:   rest/sleep promoted   single patient room provided  Taken 12/24/2024 2100 by Denise Peguero RN  Infection Prevention:   rest/sleep promoted   single patient room provided  Goal: Optimal Comfort and Wellbeing  12/25/2024 0603 by Denise Peguero RN  Outcome: Progressing  12/24/2024 2142 by Denise Peguero RN  Outcome: Not Progressing  Intervention: Monitor Pain and Promote Comfort  Recent Flowsheet Documentation  Taken 12/25/2024 0400 by Denise Peguero RN  Pain Management Interventions: pain medication given  Taken  12/25/2024 0000 by Denise Peguero RN  Pain Management Interventions: pain medication given  Taken 12/24/2024 2051 by Denise Peguero RN  Pain Management Interventions: pain medication given  Intervention: Provide Person-Centered Care  Recent Flowsheet Documentation  Taken 12/25/2024 0400 by Denise Peguero RN  Trust Relationship/Rapport: care explained  Taken 12/24/2024 2100 by Denise Peguero RN  Trust Relationship/Rapport:   care explained   questions answered   questions encouraged  Goal: Readiness for Transition of Care  12/25/2024 0603 by Denise Peguero RN  Outcome: Progressing  12/24/2024 2142 by Denise Peguero RN  Outcome: Not Progressing  Intervention: Mutually Develop Transition Plan  Recent Flowsheet Documentation  Taken 12/24/2024 2155 by Denise Peguero RN  Transportation Anticipated: family or friend will provide  Patient/Family Anticipated Services at Transition: none  Patient/Family Anticipates Transition to: home with family  Taken 12/24/2024 2145 by Denise Peguero RN  Equipment Currently Used at Home: none     Problem: Chest Pain  Goal: Resolution of Chest Pain Symptoms  12/25/2024 0603 by Denise Peguero RN  Outcome: Progressing  12/24/2024 2142 by Denise Peguero RN  Outcome: Not Progressing  Intervention: Manage Acute Chest Pain  Recent Flowsheet Documentation  Taken 12/25/2024 0400 by Denise Peguero RN  Chest Pain Intervention: activity minimized  Taken 12/25/2024 0000 by Denise Peguero RN  Chest Pain Intervention: activity minimized     Problem: Fall Injury Risk  Goal: Absence of Fall and Fall-Related Injury  12/25/2024 0603 by Denise Peguero RN  Outcome: Progressing  12/24/2024 2142 by Denise Peguero RN  Outcome: Not Progressing  Intervention: Identify and Manage Contributors  Recent Flowsheet Documentation  Taken 12/25/2024 0400 by Denise Peguero RN  Medication Review/Management: medications reviewed  Taken 12/25/2024 0200 by Denise Peguero  RN  Medication Review/Management: medications reviewed  Taken 12/25/2024 0000 by Denise Peguero RN  Medication Review/Management: medications reviewed  Taken 12/24/2024 2200 by Denise Peguero RN  Medication Review/Management: medications reviewed  Taken 12/24/2024 2100 by Denise Peguero RN  Medication Review/Management: medications reviewed  Intervention: Promote Injury-Free Environment  Recent Flowsheet Documentation  Taken 12/25/2024 0400 by Denise Peguero RN  Safety Promotion/Fall Prevention:   activity supervised   clutter free environment maintained   fall prevention program maintained   lighting adjusted   nonskid shoes/slippers when out of bed   room organization consistent   safety round/check completed  Taken 12/25/2024 0200 by Denise Peguero RN  Safety Promotion/Fall Prevention:   activity supervised   clutter free environment maintained   fall prevention program maintained   lighting adjusted   nonskid shoes/slippers when out of bed   room organization consistent   safety round/check completed  Taken 12/25/2024 0000 by Denise Peguero RN  Safety Promotion/Fall Prevention:   activity supervised   clutter free environment maintained   fall prevention program maintained   lighting adjusted   nonskid shoes/slippers when out of bed   room organization consistent   safety round/check completed  Taken 12/24/2024 2200 by Denise Peguero RN  Safety Promotion/Fall Prevention:   activity supervised   clutter free environment maintained   fall prevention program maintained   lighting adjusted   nonskid shoes/slippers when out of bed   room organization consistent   safety round/check completed  Taken 12/24/2024 2100 by Denise Peguero RN  Safety Promotion/Fall Prevention:   activity supervised   clutter free environment maintained   fall prevention program maintained   nonskid shoes/slippers when out of bed   room organization consistent   safety round/check completed     Problem: Nausea and  Vomiting  Goal: Nausea and Vomiting Relief  12/25/2024 0603 by Denise Peguero RN  Outcome: Progressing  12/24/2024 2142 by Denise Peguero RN  Outcome: Not Progressing  Intervention: Prevent and Manage Nausea and Vomiting  Recent Flowsheet Documentation  Taken 12/25/2024 0400 by Denise Peguero RN  Environmental Support: calm environment promoted  Taken 12/25/2024 0000 by Denise Peguero RN  Environmental Support: calm environment promoted  Taken 12/24/2024 2100 by Denise Peguero RN  Environmental Support: calm environment promoted  Oral Care: teeth brushed - regular toothbrush     Problem: Comorbidity Management  Goal: Maintenance of Asthma Control  Outcome: Progressing  Intervention: Maintain Asthma Symptom Control  Recent Flowsheet Documentation  Taken 12/25/2024 0400 by Denise Peguero RN  Medication Review/Management: medications reviewed  Taken 12/25/2024 0200 by Denise Peguero RN  Medication Review/Management: medications reviewed  Taken 12/25/2024 0000 by Denise Peguero RN  Medication Review/Management: medications reviewed  Taken 12/24/2024 2200 by Denise Peguero RN  Medication Review/Management: medications reviewed  Taken 12/24/2024 2100 by Denise Peguero RN  Medication Review/Management: medications reviewed  Goal: Maintenance of Behavioral Health Symptom Control  Outcome: Progressing  Intervention: Maintain Behavioral Health Symptom Control  Recent Flowsheet Documentation  Taken 12/25/2024 0400 by Denise Peguero RN  Medication Review/Management: medications reviewed  Taken 12/25/2024 0200 by Denise Peguero RN  Medication Review/Management: medications reviewed  Taken 12/25/2024 0000 by Denise Peguero RN  Medication Review/Management: medications reviewed  Taken 12/24/2024 2200 by Denise Peguero RN  Medication Review/Management: medications reviewed  Taken 12/24/2024 2100 by Denise Peguero RN  Medication Review/Management: medications reviewed  Goal: Maintenance  of COPD Symptom Control  Outcome: Progressing  Intervention: Maintain COPD (Chronic Obstructive Pulmonary Disease) Symptom Control  Recent Flowsheet Documentation  Taken 12/25/2024 0400 by Denise Peguero RN  Medication Review/Management: medications reviewed  Taken 12/25/2024 0200 by Denise Peguero RN  Medication Review/Management: medications reviewed  Taken 12/25/2024 0000 by Denise Peguero RN  Medication Review/Management: medications reviewed  Taken 12/24/2024 2200 by Denise Peguero RN  Medication Review/Management: medications reviewed  Taken 12/24/2024 2100 by Denise Peguero RN  Medication Review/Management: medications reviewed  Goal: Blood Glucose Level Within Target Range  Outcome: Progressing  Intervention: Monitor and Manage Glycemia  Recent Flowsheet Documentation  Taken 12/25/2024 0400 by Denise Peguero RN  Medication Review/Management: medications reviewed  Taken 12/25/2024 0200 by Denise Peguero RN  Medication Review/Management: medications reviewed  Taken 12/25/2024 0000 by Denise Peguero RN  Medication Review/Management: medications reviewed  Taken 12/24/2024 2200 by Denise Peguero RN  Medication Review/Management: medications reviewed  Taken 12/24/2024 2100 by Denise Peguero RN  Medication Review/Management: medications reviewed  Goal: Maintenance of Heart Failure Symptom Control  Outcome: Progressing  Intervention: Maintain Heart Failure Management  Recent Flowsheet Documentation  Taken 12/25/2024 0400 by Denise Peguero RN  Medication Review/Management: medications reviewed  Taken 12/25/2024 0200 by Denise Peguero RN  Medication Review/Management: medications reviewed  Taken 12/25/2024 0000 by Denise Peguero RN  Medication Review/Management: medications reviewed  Taken 12/24/2024 2200 by Denise Peguero RN  Medication Review/Management: medications reviewed  Taken 12/24/2024 2100 by Denise Peguero RN  Medication Review/Management: medications  reviewed  Goal: Blood Pressure in Desired Range  Outcome: Progressing  Intervention: Maintain Blood Pressure Management  Recent Flowsheet Documentation  Taken 12/25/2024 0400 by Denise Peguero RN  Medication Review/Management: medications reviewed  Taken 12/25/2024 0200 by Denise Peguero RN  Medication Review/Management: medications reviewed  Taken 12/25/2024 0000 by Denise Peguero RN  Medication Review/Management: medications reviewed  Taken 12/24/2024 2200 by Denise Peguero RN  Medication Review/Management: medications reviewed  Taken 12/24/2024 2100 by Denise Peguero RN  Medication Review/Management: medications reviewed  Goal: Maintenance of Osteoarthritis Symptom Control  Outcome: Progressing  Intervention: Maintain Osteoarthritis Symptom Control  Recent Flowsheet Documentation  Taken 12/25/2024 0400 by Denise Peguero RN  Activity Management: up ad ronald  Medication Review/Management: medications reviewed  Taken 12/25/2024 0200 by Denise Peguero RN  Activity Management: up ad ronald  Medication Review/Management: medications reviewed  Taken 12/25/2024 0000 by Denise Peguero RN  Activity Management: up ad ronald  Medication Review/Management: medications reviewed  Taken 12/24/2024 2200 by Denise Peguero RN  Activity Management: up ad ronald  Medication Review/Management: medications reviewed  Taken 12/24/2024 2100 by Denise Peguero RN  Activity Management: up ad ronald  Medication Review/Management: medications reviewed  Goal: Bariatric Home Regimen Maintained  Outcome: Progressing  Intervention: Maintain and Manage Postbariatric Surgery Care  Recent Flowsheet Documentation  Taken 12/25/2024 0400 by Denise Peguero RN  Medication Review/Management: medications reviewed  Taken 12/25/2024 0200 by Denise Peguero RN  Medication Review/Management: medications reviewed  Taken 12/25/2024 0000 by Denise Peguero RN  Medication Review/Management: medications reviewed  Taken 12/24/2024 2200 by  Denise Peguero RN  Medication Review/Management: medications reviewed  Taken 12/24/2024 2100 by Denise Peguero RN  Medication Review/Management: medications reviewed  Goal: Maintenance of Seizure Control  Outcome: Progressing  Intervention: Maintain Seizure Symptom Control  Recent Flowsheet Documentation  Taken 12/25/2024 0400 by Denise Peguero RN  Medication Review/Management: medications reviewed  Taken 12/25/2024 0200 by Denise Peguero RN  Medication Review/Management: medications reviewed  Taken 12/25/2024 0000 by Denise Peguero RN  Medication Review/Management: medications reviewed  Taken 12/24/2024 2200 by Denise Peguero RN  Medication Review/Management: medications reviewed  Taken 12/24/2024 2100 by Denise Peguero RN  Medication Review/Management: medications reviewed   Goal Outcome Evaluation:  Plan of Care Reviewed With: patient        Progress: no change

## 2024-12-25 NOTE — DISCHARGE SUMMARY
[facility]    Narinder Ortiz MD    CHIEF COMPLAINT:     Chest pain and dyspnea    HISTORY OF PRESENT ILLNESS:    Obtained from ED provider HPI on 12/20/2024:  Patient is a 54-year-old obese white male with a history of hypertension, hyperlipidemia, diabetes.  He presents today from home with complaints of chest tightness and pressure like someone is sitting on his chest.  Complains of pain into the middle of his back.  States he has not really felt well for the last 3 days.  His symptoms seem to be worse with exertion and better with rest.  States he has had some sweats at night.  Denies any fever or cough.  No leg pain or swelling.    12/25/24:  Patient confirms the HPI noted above reporting a several day history of some worsening chest discomfort described as a tightness/heaviness with some associated dyspnea.  Some associated thoracic back pain is noted as well.  Pain is located substernally without obvious provoking factors and he does note that he has been found to have frequent PVCs recently with PCP starting beta-blocker therapy to attempt to address this.  Some diaphoresis has been noted when his symptoms are at their worst but he denies any nausea, vomiting or other GI symptoms beyond some sensation of abdominal bloating.  Additionally patient reports that he has had workup for liver pathology in the past including biopsy which was reported as unremarkable.            Past Medical History:   Diagnosis Date    Bilateral leg cramps     AT HS    Colon polyps     FOLLOWED BY DR. SERA LE    DDD (degenerative disc disease), lumbar 2018    L3,L4,L5    Diabetes     TYPE 2, NIDDM    Disorder of gallbladder     Diverticulitis 09/2016    SIGMOID    Diverticulitis of colon 04/18/2014    Fatty liver     GERD (gastroesophageal reflux disease)     Hyperlipidemia     Hypertension     Hypertriglyceridemia     Hypogonadism 02/07/2013    Impotence of organic origin 02/22/2012    Irritable bowel  syndrome 01/14/2013    Lumbar compression fracture     L4    Lumbar radiculopathy     Muscle spasm of left calf     Numbness and tingling of foot     LEFT FOOT, S/P BACK SURGERY    Obesity     WOLF (obstructive sleep apnea)     cpap    Plantar fasciitis 06/09/2016    Polycythemia 02/22/2012    Post laminectomy syndrome     Right sided abdominal pain 02/28/2021    SEEN AT [facility] ER    RUQ pain 05/02/2021    SEEN AT [facility] ER    Spinal stenosis of lumbar region without neurogenic claudication 09/05/2018    Splenomegaly 05/2021    Spondylolisthesis      Past Surgical History:   Procedure Laterality Date    ANAL FISSURECTOMY N/A 04/15/2009    DR.RAYMOND MICHAELS AT [facility]    CHOLECYSTECTOMY  2022    CHOLECYSTECTOMY WITH INTRAOPERATIVE CHOLANGIOGRAM N/A 06/23/2021    Procedure: CHOLECYSTECTOMY LAPAROSCOPIC INTRAOPERATIVE CHOLANGIOGRAM;  Surgeon: Chris Carrion Jr., MD;  Location: [facility] OR;  Service: General;  Laterality: N/A;    COLON SURGERY N/A 09/16/2016    SIGMOID COLECTOMY, D/T DIVERTICULITIS, DR. TYLER DUNBAR AT [facility]    COLONOSCOPY N/A 04/15/2009    MIXED ADENOMATOUS POLYP IN RECTUM, DR. HERLINDA MICHAELS AT [facility]    COLONOSCOPY N/A 01/28/2021    7 MM HYPERPLASTIC POLYP IN RECTUM, MULTIPLE SMALL AND LARGE DIVERTICULA IN ENTIRE COLON, RESCOPE IN 5 YRS, DR. SERA LE AT [facility]    COLONOSCOPY W/ POLYPECTOMY N/A 12/11/2013    2 TUBULOVILLOUS ADENOMA POLYPS IN CECUM, 8 MM TUBULOVILLOUS ADENOMA POLYP IN ASCENDING, 14 MM TUBULOVILLOUS ADENOMA POLYP IN DISTAL ASCENDING, 5 MM TUBULAR ADENOMA POLYP IN ASCENDING, 5 MM BENIGN POLYP IN RECTUM, RESCOPE IN 2 YRS, DR. SERA LE AT [facility]    ENDOSCOPY AND COLONOSCOPY N/A 2014    HEMORRHOIDECTOMY N/A 04/15/2009    DR.RAYMOND MICHAELS AT [facility]    HERNIA REPAIR Left 09/14/2017    lap left inguinal hernia repair, ventral hernia repair, DR. TYLER DUNBAR AT [facility]    LUMBAR EPIDURAL INJECTION N/A     lumbar    LUMBAR SPINE POSTERIOR N/A 12/03/2018    TLIF L3-L5, DR. MOHAMMAD MAJD AT [facility]      TONSILLECTOMY Bilateral     UPPER GASTROINTESTINAL ENDOSCOPY      VENTRAL HERNIA REPAIR N/A 2018    LAPAROSCOPIC WITH PREVIOUS MESH REMOVAL, DR. TYLER DUNBAR AT [facility]     Family History   Problem Relation Age of Onset    No Known Problems Mother     Colon cancer Father     Malig Hyperthermia Neg Hx     Thyroid cancer Neg Hx     Liver disease Neg Hx      Social History     Tobacco Use    Smoking status: Former     Current packs/day: 0.00     Average packs/day: 0.5 packs/day for 10.0 years (5.0 ttl pk-yrs)     Types: Cigarettes     Start date: 2002     Quit date: 2012     Years since quittin.9    Smokeless tobacco: Current     Types: Chew   Vaping Use    Vaping status: Never Used   Substance Use Topics    Alcohol use: Not Currently    Drug use: Never     Medications Prior to Admission   Medication Sig Dispense Refill Last Dose/Taking    gabapentin (NEURONTIN) 300 MG capsule Take 1 capsule by mouth 2 (Two) Times a Day.   2024    lisinopril (PRINIVIL,ZESTRIL) 20 MG tablet TAKE 1 TABLET BY MOUTH DAILY 90 tablet 1 2024    metFORMIN (GLUCOPHAGE) 1000 MG tablet TAKE 1 TABLET BY MOUTH TWICE DAILY WITH MEALS 180 tablet 1 2024    multivitamin (THERAGRAN) tablet tablet Take 1 tablet by mouth Every Night. HOLD FOR SURGERY   2024    nebivolol (BYSTOLIC) 5 MG tablet TAKE 1 TABLET BY MOUTH DAILY 90 tablet 1 2024    pantoprazole (PROTONIX) 40 MG EC tablet TAKE 1 TABLET BY MOUTH DAILY 90 tablet 1 2024    rosuvastatin (CRESTOR) 10 MG tablet Take 1 tablet by mouth Every Night. 90 tablet 1 2024    Semaglutide (Rybelsus) 14 MG tablet Take 1 tablet by mouth Every Morning Before Breakfast. 30 tablet 0 2024    acetaminophen (TYLENOL) 500 MG tablet Take 1 tablet by mouth Every 6 (Six) Hours As Needed.       albuterol sulfate  (90 Base) MCG/ACT inhaler Inhale 2 puffs Every 6 (Six) Hours As Needed.        Allergies:  Penicillin g    Immunization History   Administered  Date(s) Administered    Flu Vaccine Intradermal Quad 18-64YR 10/28/2021    Fluzone (or Fluarix & Flulaval for VFC) >6mos 01/29/2018    Fluzone Quad >6mos (Multi-dose) 11/09/2019    Hepatitis A 05/02/2023, 12/14/2023    Hepatitis B Adult/Adolescent IM 08/09/2023, 12/14/2023, 08/07/2024    Influenza, Unspecified 11/09/2018    Pneumococcal Conjugate 20-Valent (PCV20) 06/02/2022    Tdap 08/07/2024           REVIEW OF SYSTEMS:    Review of Systems   Constitutional: Negative.   HENT: Negative.     Eyes: Negative.    Cardiovascular:  Positive for chest pain, dyspnea on exertion and leg swelling.   Respiratory: Negative.     Skin: Negative.    Musculoskeletal: Negative.    Gastrointestinal:  Positive for bloating.   Genitourinary: Negative.    Neurological: Negative.    Psychiatric/Behavioral: Negative.         Vital Signs  Temp:  [97.4 °F (36.3 °C)-98.1 °F (36.7 °C)] 97.7 °F (36.5 °C)  Heart Rate:  [49-80] 65  Resp:  [13-20] 16  BP: (118-164)/() 140/83          Physical Exam:  Physical Exam  Vitals reviewed.   Constitutional:       General: He is not in acute distress.     Appearance: Normal appearance. He is obese. He is not ill-appearing, toxic-appearing or diaphoretic.   HENT:      Head: Normocephalic.      Right Ear: External ear normal.      Left Ear: External ear normal.      Nose: Nose normal.      Mouth/Throat:      Mouth: Mucous membranes are moist.   Eyes:      Extraocular Movements: Extraocular movements intact.   Cardiovascular:      Rate and Rhythm: Normal rate. Rhythm irregular.      Pulses: Normal pulses.      Heart sounds: Normal heart sounds.   Pulmonary:      Effort: Pulmonary effort is normal.      Breath sounds: Normal breath sounds.   Abdominal:      General: Bowel sounds are normal.      Palpations: Abdomen is soft.      Tenderness: There is no abdominal tenderness.   Musculoskeletal:         General: Normal range of motion.      Cervical back: Normal range of motion.   Skin:     General: Skin  is warm and dry.      Capillary Refill: Capillary refill takes less than 2 seconds.   Neurological:      General: No focal deficit present.      Mental Status: He is alert and oriented to person, place, and time.   Psychiatric:         Mood and Affect: Mood normal.         Behavior: Behavior normal.         Thought Content: Thought content normal.         Judgment: Judgment normal.         Emotional Behavior:   Normal   Debilities:  None  Results Review:    I reviewed the patient's new clinical results.  Lab Results (most recent)       Procedure Component Value Units Date/Time    High Sensitivity Troponin T [[id number]]  (Normal) Collected: 12/25/24 0357    Specimen: Blood from Arm, Right Updated: 12/25/24 0818     HS Troponin T 12 ng/L     Narrative:      High Sensitive Troponin T Reference Range:  <14.0 ng/L- Negative Female for AMI  <22.0 ng/L- Negative Male for AMI  >=14 - Abnormal Female indicating possible myocardial injury.  >=22 - Abnormal Male indicating possible myocardial injury.   Clinicians would have to utilize clinical acumen, EKG, Troponin, and serial changes to determine if it is an Acute Myocardial Infarction or myocardial injury due to an underlying chronic condition.         POC Glucose 4x Daily Before Meals & at Bedtime [[id number]]  (Normal) Collected: 12/25/24 0711    Specimen: Blood Updated: 12/25/24 0712     Glucose 81 mg/dL      Comment: Serial Number: [id number]:  [id number]       Basic Metabolic Panel [[id number]]  (Normal) Collected: 12/25/24 0357    Specimen: Blood from Arm, Right Updated: 12/25/24 0446     Glucose 82 mg/dL      BUN 17 mg/dL      Creatinine 0.78 mg/dL      Sodium 138 mmol/L      Potassium 4.0 mmol/L      Chloride 106 mmol/L      CO2 24.6 mmol/L      Calcium 8.9 mg/dL      BUN/Creatinine Ratio 21.8     Anion Gap 7.4 mmol/L      eGFR 107.3 mL/min/1.73     Narrative:      GFR Categories in Chronic Kidney Disease (CKD)      GFR Category          GFR (mL/min/1.73)     Interpretation  G1                     90 or greater         Normal or high (1)  G2                      60-89                Mild decrease (1)  G3a                   45-59                Mild to moderate decrease  G3b                   30-44                Moderate to severe decrease  G4                    15-29                Severe decrease  G5                    14 or less           Kidney failure          (1)In the absence of evidence of kidney disease, neither GFR category G1 or G2 fulfill the criteria for CKD.    eGFR calculation 2021 CKD-EPI creatinine equation, which does not include race as a factor    CBC & Differential [[id number]]  (Abnormal) Collected: 12/25/24 0357    Specimen: Blood from Arm, Right Updated: 12/25/24 0426    Narrative:      The following orders were created for panel order CBC & Differential.  Procedure                               Abnormality         Status                     ---------                               -----------         ------                     CBC Auto Differential[[id number]]        Abnormal            Final result                 Please view results for these tests on the individual orders.    CBC Auto Differential [[id number]]  (Abnormal) Collected: 12/25/24 0357    Specimen: Blood from Arm, Right Updated: 12/25/24 0426     WBC 7.01 10*3/mm3      RBC 3.99 10*6/mm3      Hemoglobin 12.9 g/dL      Hematocrit 37.5 %      MCV 94.0 fL      MCH 32.3 pg      MCHC 34.4 g/dL      RDW 12.8 %      RDW-SD 43.8 fl      MPV 11.8 fL      Platelets 85 10*3/mm3      Neutrophil % 61.5 %      Lymphocyte % 25.7 %      Monocyte % 10.4 %      Eosinophil % 1.7 %      Basophil % 0.4 %      Immature Grans % 0.3 %      Neutrophils, Absolute 4.31 10*3/mm3      Lymphocytes, Absolute 1.80 10*3/mm3      Monocytes, Absolute 0.73 10*3/mm3      Eosinophils, Absolute 0.12 10*3/mm3      Basophils, Absolute 0.03 10*3/mm3      Immature Grans, Absolute 0.02 10*3/mm3      nRBC 0.0 /100 WBC     High  Sensitivity Troponin T 1Hr [[id number]]  (Normal) Collected: 12/24/24 1702    Specimen: Blood Updated: 12/24/24 1731     HS Troponin T 13 ng/L      Troponin T Numeric Delta -1 ng/L     Narrative:      High Sensitive Troponin T Reference Range:  <14.0 ng/L- Negative Female for AMI  <22.0 ng/L- Negative Male for AMI  >=14 - Abnormal Female indicating possible myocardial injury.  >=22 - Abnormal Male indicating possible myocardial injury.   Clinicians would have to utilize clinical acumen, EKG, Troponin, and serial changes to determine if it is an Acute Myocardial Infarction or myocardial injury due to an underlying chronic condition.         BNP [[id number]]  (Normal) Collected: 12/24/24 1527    Specimen: Blood from Arm, Right Updated: 12/24/24 1626     proBNP 665.0 pg/mL     Narrative:      This assay is used as an aid in the diagnosis of individuals suspected of having heart failure. It can be used as an aid in the diagnosis of acute decompensated heart failure (ADHF) in patients presenting with signs and symptoms of ADHF to the emergency department (ED). In addition, NT-proBNP of <300 pg/mL indicates ADHF is not likely.    Age Range Result Interpretation  NT-proBNP Concentration (pg/mL:      <50             Positive            >450                   Gray                 300-450                    Negative             <300    50-75           Positive            >900                  Gray                300-900                  Negative            <300      >75             Positive            >1800                  Gray                300-1800                  Negative            <300    Respiratory Panel PCR w/COVID-19(SARS-CoV-2) JAH/KATHRYN/JOLYNN/PAD/COR/ISABEL In-House, NP Swab in [facility]TM/[facility], 2 HR TAT - Swab, Nasopharynx [[id number]]  (Normal) Collected: 12/24/24 1529    Specimen: Swab from Nasopharynx Updated: 12/24/24 1619     ADENOVIRUS, PCR Not Detected     Coronavirus 229E Not Detected     Coronavirus HKU1 Not Detected      Coronavirus NL63 Not Detected     Coronavirus OC43 Not Detected     COVID19 Not Detected     Human Metapneumovirus Not Detected     Human Rhinovirus/Enterovirus Not Detected     Influenza A PCR Not Detected     Influenza B PCR Not Detected     Parainfluenza Virus 1 Not Detected     Parainfluenza Virus 2 Not Detected     Parainfluenza Virus 3 Not Detected     Parainfluenza Virus 4 Not Detected     RSV, PCR Not Detected     Bordetella pertussis pcr Not Detected     Bordetella parapertussis PCR Not Detected     Chlamydophila pneumoniae PCR Not Detected     Mycoplasma pneumo by PCR Not Detected    Narrative:      In the setting of a positive respiratory panel with a viral infection PLUS a negative procalcitonin without other underlying concern for bacterial infection, consider observing off antibiotics or discontinuation of antibiotics and continue supportive care. If the respiratory panel is positive for atypical bacterial infection (Bordetella pertussis, Chlamydophila pneumoniae, or Mycoplasma pneumoniae), consider antibiotic de-escalation to target atypical bacterial infection.    CBC & Differential [[id number]]  (Abnormal) Collected: 12/24/24 1527    Specimen: Blood from Arm, Right Updated: 12/24/24 3348    Narrative:      The following orders were created for panel order CBC & Differential.  Procedure                               Abnormality         Status                     ---------                               -----------         ------                     CBC Auto Differential[[id number]]        Abnormal            Final result               Scan Slide[[id number]]                   Normal              Final result                 Please view results for these tests on the individual orders.    CBC Auto Differential [[id number]]  (Abnormal) Collected: 12/24/24 1527    Specimen: Blood from Arm, Right Updated: 12/24/24 1558     WBC 7.89 10*3/mm3      RBC 4.25 10*6/mm3      Hemoglobin 13.7 g/dL      Hematocrit 39.5  %      MCV 92.9 fL      MCH 32.2 pg      MCHC 34.7 g/dL      RDW 12.8 %      RDW-SD 43.7 fl      MPV 11.3 fL      Platelets 107 10*3/mm3      Neutrophil % 66.3 %      Lymphocyte % 23.2 %      Monocyte % 9.0 %      Eosinophil % 1.1 %      Basophil % 0.3 %      Immature Grans % 0.1 %      Neutrophils, Absolute 5.23 10*3/mm3      Lymphocytes, Absolute 1.83 10*3/mm3      Monocytes, Absolute 0.71 10*3/mm3      Eosinophils, Absolute 0.09 10*3/mm3      Basophils, Absolute 0.02 10*3/mm3      Immature Grans, Absolute 0.01 10*3/mm3      nRBC 0.0 /100 WBC     Scan Slide [[id number]]  (Normal) Collected: 12/24/24 1527    Specimen: Blood from Arm, Right Updated: 12/24/24 1558     RBC Morphology Normal     WBC Morphology Normal     Platelet Morphology Normal    Narrative:      No visible platelet clumping.    Comprehensive Metabolic Panel [[id number]] Collected: 12/24/24 1527    Specimen: Blood from Arm, Right Updated: 12/24/24 1556     Glucose 81 mg/dL      BUN 18 mg/dL      Creatinine 0.87 mg/dL      Sodium 139 mmol/L      Potassium 4.4 mmol/L      Chloride 106 mmol/L      CO2 24.5 mmol/L      Calcium 9.6 mg/dL      Total Protein 6.9 g/dL      Albumin 4.3 g/dL      ALT (SGPT) 20 U/L      AST (SGOT) 29 U/L      Alkaline Phosphatase 49 U/L      Total Bilirubin 0.6 mg/dL      Globulin 2.6 gm/dL      A/G Ratio 1.7 g/dL      BUN/Creatinine Ratio 20.7     Anion Gap 8.5 mmol/L      eGFR 103.8 mL/min/1.73     Narrative:      GFR Categories in Chronic Kidney Disease (CKD)      GFR Category          GFR (mL/min/1.73)    Interpretation  G1                     90 or greater         Normal or high (1)  G2                      60-89                Mild decrease (1)  G3a                   45-59                Mild to moderate decrease  G3b                   30-44                Moderate to severe decrease  G4                    15-29                Severe decrease  G5                    14 or less           Kidney failure          (1)In the  absence of evidence of kidney disease, neither GFR category G1 or G2 fulfill the criteria for CKD.    eGFR calculation 2021 CKD-EPI creatinine equation, which does not include race as a factor    High Sensitivity Troponin T [[id number]]  (Normal) Collected: 12/24/24 1527    Specimen: Blood from Arm, Right Updated: 12/24/24 1556     HS Troponin T 14 ng/L     Narrative:      High Sensitive Troponin T Reference Range:  <14.0 ng/L- Negative Female for AMI  <22.0 ng/L- Negative Male for AMI  >=14 - Abnormal Female indicating possible myocardial injury.  >=22 - Abnormal Male indicating possible myocardial injury.   Clinicians would have to utilize clinical acumen, EKG, Troponin, and serial changes to determine if it is an Acute Myocardial Infarction or myocardial injury due to an underlying chronic condition.         Protime-INR [[id number]]  (Abnormal) Collected: 12/24/24 1527    Specimen: Blood from Arm, Right Updated: 12/24/24 1548     Protime 14.3 Seconds      INR 1.11    aPTT [[id number]]  (Abnormal) Collected: 12/24/24 1527    Specimen: Blood from Arm, Right Updated: 12/24/24 1548     PTT 35.5 seconds     D-dimer, Quantitative [[id number]]  (Abnormal) Collected: 12/24/24 1527    Specimen: Blood from Arm, Right Updated: 12/24/24 1548     D-Dimer, Quantitative 0.65 MCGFEU/mL     Narrative:      According to the assay 's published package insert, a normal (<0.50 MCGFEU/mL) D-dimer result in conjunction with a non-high clinical probability assessment, excludes deep vein thrombosis (DVT) and pulmonary embolism (PE) with high sensitivity.    D-dimer values increase with age and this can make VTE exclusion of an older population difficult. To address this, the [employer], based on best available evidence and recent guidelines, recommends that clinicians use age-adjusted D-dimer thresholds in patients greater than 50 years of age with: a) a low probability of PE who do not meet all Pulmonary  "Embolism Rule Out Criteria, or b) in those with intermediate probability of PE.   The formula for an age-adjusted D-dimer cut-off is \"age/100\".  For example, a 60 year old patient would have an age-adjusted cut-off of 0.60 MCGFEU/mL and an 80 year old 0.80 MCGFEU/mL.            Imaging Results (Most Recent)       Procedure Component Value Units Date/Time    CT Angiogram Chest Pulmonary Embolism [[id number]] Collected: 12/24/24 1713     Updated: 12/24/24 1721    Narrative:      CT ANGIOGRAM CHEST PULMONARY EMBOLISM    Date of Exam: 12/24/2024 5:00 PM EST    Indication: cp/soa/elevated dimer/abnormal cxr.    Comparison: 5/2/2021    Technique: Axial CT images were obtained of the chest after the uneventful intravenous administration of iodinated contrast utilizing pulmonary embolism protocol.  Sagittal and coronal reconstructions were performed.  Automated exposure control and   iterative reconstruction methods were used.      Findings:    Pulmonary arteries:Adequate opacification of the pulmonary arteries. No evidence of acute pulmonary embolism.    Aorta: Unremarkable.    Lungs and Pleura: Mild interstitial thickening within the lung bases. Otherwise the lungs are clear. No pleural effusion or pneumothorax.    Mediastinum/Judy: No lymphadenopathy. No suspicious mass.    Heart: Normal in size. No pericardial effusion. Normal RV/LV ratio.    Soft Tissue: Unremarkable.      Upper Abdomen: Findings suggestive of a cirrhotic liver. Otherwise unremarkable    Bones: No acute osseous abnormality.        Impression:      Impression:  No pulmonary embolism.    Mild diffuse interstitial thickening, most significantly within the dependent portions of the lungs, is concerning for mild pulmonary edema.    Cirrhotic liver morphology.        Electronically Signed: Sandoval Thomas DO    12/24/2024 5:19 PM EST    Workstation ID: [id number]    XR Chest 1 View [[id number]] Collected: 12/24/24 1525     Updated: 12/24/24 1531    Narrative: "      XR CHEST 1 VW    Date of Exam: 12/24/2024 3:24 PM EST    Indication: cp    Comparison: 11/23/2018    Findings:  Cardiomediastinal silhouette is prominent. There is pulmonary vascular congestion with bilateral interstitial and airspace opacities. No pleural effusion or pneumothorax.      Impression:      Impression:  Mild cardiac prominence with pulmonary vascular congestion and mixed interstitial and airspace opacities suggestive of pulmonary edema/CHF.      Electronically Signed: Ana Maria Kidd MD    12/24/2024 3:29 PM EST    Workstation ID: [id number]          reviewed    ECG/EMG Results (most recent)       Procedure Component Value Units Date/Time    ECG 12 Lead Chest Pain [[id number]] Collected: 12/24/24 1443     Updated: 12/24/24 1444     QT Interval 387 ms      QTC Interval 426 ms     Narrative:      HEART RATE=73  bpm  RR Zwhidlly=182  ms  NJ Pddudjzw=849  ms  P Horizontal Axis=3  deg  P Front Axis=29  deg  QRSD Interval=97  ms  QT Uoubsugh=132  ms  KEsB=374  ms  QRS Axis=-6  deg  T Wave Axis=39  deg  - ABNORMAL ECG -  Sinus rhythm  Ventricular premature complex  Probable left atrial enlargement  Inferior infarct, old  Date and Time of Study:2024-12-24 14:43:06    Telemetry Scan [[id number]] Resulted: 12/24/24     Updated: 12/25/24 0729    Telemetry Scan [[id number]] Resulted: 12/24/24     Updated: 12/25/24 0920    Telemetry Scan [[id number]] Resulted: 12/24/24     Updated: 12/25/24 1025          reviewed    Results for orders placed during the hospital encounter of 08/13/24    Doppler Ankle Brachial Index Single Level CAR    Interpretation Summary    Right Conclusion: The right NINO is normal. Normal digital pressures.    Left Conclusion: The left NINO is normal. Normal digital pressures.      Results for orders placed during the hospital encounter of 09/06/24    Adult Stress Echo W/ Cont or Stress Agent if Necessary Per Protocol    Interpretation Summary    Normal stress echo with no significant  echocardiographic evidence for myocardial ischemia.    Findings consistent with a normal ECG stress test. PVC frequency improved with exercise..    Left ventricular systolic function is normal. Calculated left ventricular EF = 55.2% Normal left ventricular cavity size and wall thickness noted. All left ventricular wall segments contract normally. Left ventricular diastolic function was normal.      Microbiology Results (last 10 days)       Procedure Component Value - Date/Time    Respiratory Panel PCR w/COVID-19(SARS-CoV-2) [facility] In-House, NP Swab in UTM/VTM, 2 HR TAT - Swab, Nasopharynx [[id number]]  (Normal) Collected: 12/24/24 1529    Lab Status: Final result Specimen: Swab from Nasopharynx Updated: 12/24/24 1619     ADENOVIRUS, PCR Not Detected     Coronavirus 229E Not Detected     Coronavirus HKU1 Not Detected     Coronavirus NL63 Not Detected     Coronavirus OC43 Not Detected     COVID19 Not Detected     Human Metapneumovirus Not Detected     Human Rhinovirus/Enterovirus Not Detected     Influenza A PCR Not Detected     Influenza B PCR Not Detected     Parainfluenza Virus 1 Not Detected     Parainfluenza Virus 2 Not Detected     Parainfluenza Virus 3 Not Detected     Parainfluenza Virus 4 Not Detected     RSV, PCR Not Detected     Bordetella pertussis pcr Not Detected     Bordetella parapertussis PCR Not Detected     Chlamydophila pneumoniae PCR Not Detected     Mycoplasma pneumo by PCR Not Detected    Narrative:      In the setting of a positive respiratory panel with a viral infection PLUS a negative procalcitonin without other underlying concern for bacterial infection, consider observing off antibiotics or discontinuation of antibiotics and continue supportive care. If the respiratory panel is positive for atypical bacterial infection (Bordetella pertussis, Chlamydophila pneumoniae, or Mycoplasma pneumoniae), consider antibiotic de-escalation to target atypical bacterial infection.             Assessment & Plan     Chest pain       Chest pain  Lab Results   Component Value Date    TROPONINT 12 12/25/2024    TROPONINT 13 12/24/2024    TROPONINT 14 12/24/2024   -proBNP: 665.0  -D-dimer: 0.65  -Respiratory panel negative  -Chest X-ray: Mild cardiac prominence with pulmonary vascular congestion and mixed interstitial and airspace opacities suggesting edema/CHF  -CT PE protocol showed no pulmonary embolism with mild diffuse interstitial thickening most significant in the dependent portions of the lungs concerning for mild pulmonary edema with cirrhotic liver morphology noted  -EKG: Sinus rhythm at 73 without obvious acute changes with PVC noted and a QTc of 426 ms  -In the ED pt given 325 mg aspirin, Dilaudid, morphine and Zofran  -Cardiology consulted who discussed case with patient and report patient wants to pursue continued monitoring and possible evaluation outpatient with recommendations to initiate Lasix, Imdur, aspirin and close follow-up in 2 weeks  -Telemetry  -NPO    Thrombocytopenia  -Platelets: 85 (Has been trending downwards since August 2024)  -Referral to hematology    Hypertension  -Moderately controlled   BP Readings from Last 1 Encounters:   12/25/24 140/83   - Continue nebivolol and lisinopril  - Monitor while admitted    I discussed the patients findings and my recommendations with patient and nursing staff.     Discharge Diagnosis:      Chest pain      Hospital Course  Patient is a 54 y.o. male presented with chest pain with dyspnea and frequent ectopy with an HPI noted above.  Serial troponins were assessed and found to be 14, 13, 12 with a proBNP of 665.0 and a D-dimer of 0.65.  Respiratory panel was obtained and was negative and chest x-ray was reported with signs of mild cardiac prominence and pulmonary vascular congestion with mixed interstitial and airspace opacities suggesting edema/CHF.  CT PE protocol showed no pulmonary embolism with mild diffuse interstitial thickening  most significant in the dependent portions of the lungs concerning for mild pulmonary edema with cirrhotic liver morphology noted.  He was found to be thrombocytopenic with platelets of 85 as well and this has been noted as a trend since August of this year.  Patient was given 325 mg aspirin in the ED as well as Dilaudid, morphine and Zofran and cardiology was consulted who discussed options with patient with patient preferring to pursue outpatient evaluation.  Cardiologist did recommend initiation of Lasix 20 mg along with 81 mg of aspirin daily and 31 mg of Imdur with close follow-up in 2 weeks on an outpatient basis.  At this time he is felt to be in good condition for discharge with close follow-up with his PCP as well as cardiology on an outpatient basis.  His full testing/results and plan were discussed with patient along with concerning/alarm symptoms for which to call 911/return to the ED.  All questions were answered and he verbalizes his understanding and agreement.    Past Medical History:     Past Medical History:   Diagnosis Date    Bilateral leg cramps     AT     Colon polyps     FOLLOWED BY DR. SERA LE    DDD (degenerative disc disease), lumbar 2018    L3,L4,L5    Diabetes     TYPE 2, NIDDM    Disorder of gallbladder     Diverticulitis 09/2016    SIGMOID    Diverticulitis of colon 04/18/2014    Fatty liver     GERD (gastroesophageal reflux disease)     Hyperlipidemia     Hypertension     Hypertriglyceridemia     Hypogonadism 02/07/2013    Impotence of organic origin 02/22/2012    Irritable bowel syndrome 01/14/2013    Lumbar compression fracture     L4    Lumbar radiculopathy     Muscle spasm of left calf     Numbness and tingling of foot     LEFT FOOT, S/P BACK SURGERY    Obesity     WOLF (obstructive sleep apnea)     cpap    Plantar fasciitis 06/09/2016    Polycythemia 02/22/2012    Post laminectomy syndrome     Right sided abdominal pain 02/28/2021    SEEN AT [facility] ER    RUQ pain 05/02/2021     SEEN AT [facility] ER    Spinal stenosis of lumbar region without neurogenic claudication 09/05/2018    Splenomegaly 05/2021    Spondylolisthesis        Past Surgical History:     Past Surgical History:   Procedure Laterality Date    ANAL FISSURECTOMY N/A 04/15/2009    DR.RAYMOND MICHAELS AT [facility]    CHOLECYSTECTOMY  2022    CHOLECYSTECTOMY WITH INTRAOPERATIVE CHOLANGIOGRAM N/A 06/23/2021    Procedure: CHOLECYSTECTOMY LAPAROSCOPIC INTRAOPERATIVE CHOLANGIOGRAM;  Surgeon: Chris Carrion Jr., MD;  Location: [facility] OR;  Service: General;  Laterality: N/A;    COLON SURGERY N/A 09/16/2016    SIGMOID COLECTOMY, D/T DIVERTICULITIS, DR. TYLER DUNBAR AT [facility]    COLONOSCOPY N/A 04/15/2009    MIXED ADENOMATOUS POLYP IN RECTUM, DR. HERLINDA MICHAELS AT [facility]    COLONOSCOPY N/A 01/28/2021    7 MM HYPERPLASTIC POLYP IN RECTUM, MULTIPLE SMALL AND LARGE DIVERTICULA IN ENTIRE COLON, RESCOPE IN 5 YRS, DR. SERA LE AT [facility]    COLONOSCOPY W/ POLYPECTOMY N/A 12/11/2013    2 TUBULOVILLOUS ADENOMA POLYPS IN CECUM, 8 MM TUBULOVILLOUS ADENOMA POLYP IN ASCENDING, 14 MM TUBULOVILLOUS ADENOMA POLYP IN DISTAL ASCENDING, 5 MM TUBULAR ADENOMA POLYP IN ASCENDING, 5 MM BENIGN POLYP IN RECTUM, RESCOPE IN 2 YRS, DR. SERA LE AT [facility]    ENDOSCOPY AND COLONOSCOPY N/A 2014    HEMORRHOIDECTOMY N/A 04/15/2009    DR.RAYMOND MICHAELS AT [facility]    HERNIA REPAIR Left 09/14/2017    lap left inguinal hernia repair, ventral hernia repair, DR. TYLER DUNBAR AT [facility]    LUMBAR EPIDURAL INJECTION N/A     lumbar    LUMBAR SPINE POSTERIOR N/A 12/03/2018    TLIF L3-L5, DR. MOHAMMAD MAJD AT [facility]     TONSILLECTOMY Bilateral     UPPER GASTROINTESTINAL ENDOSCOPY      VENTRAL HERNIA REPAIR N/A 03/01/2018    LAPAROSCOPIC WITH PREVIOUS MESH REMOVAL, DR. TYLER DUNBAR AT [facility]       Social History:   Social History     Socioeconomic History    Marital status:     Number of children: 2   Tobacco Use    Smoking status: Former     Current packs/day: 0.00     Average  packs/day: 0.5 packs/day for 10.0 years (5.0 ttl pk-yrs)     Types: Cigarettes     Start date: 2002     Quit date: 2012     Years since quittin.9    Smokeless tobacco: Current     Types: Chew   Vaping Use    Vaping status: Never Used   Substance and Sexual Activity    Alcohol use: Not Currently    Drug use: Never    Sexual activity: Not Currently     Partners: Female       Procedures Performed         Consults:   Consults       Date and Time Order Name Status Description    2024  5:56 PM Inpatient Cardiology Consult Completed             Condition on Discharge:     Stable    Discharge Disposition  Home or Self Care    Discharge Medications     Discharge Medications        New Medications        Instructions Start Date   aspirin 81 MG EC tablet   81 mg, Oral, Daily      furosemide 20 MG tablet  Commonly known as: Lasix   20 mg, Oral, Daily      isosorbide mononitrate 30 MG 24 hr tablet  Commonly known as: IMDUR   30 mg, Oral, Every 24 Hours Scheduled             Continue These Medications        Instructions Start Date   acetaminophen 500 MG tablet  Commonly known as: TYLENOL   Take 1 tablet by mouth Every 6 (Six) Hours As Needed.      albuterol sulfate  (90 Base) MCG/ACT inhaler  Commonly known as: PROVENTIL HFA;VENTOLIN HFA;PROAIR HFA   Inhale 2 puffs Every 6 (Six) Hours As Needed.      gabapentin 300 MG capsule  Commonly known as: NEURONTIN   300 mg, 2 Times Daily      lisinopril 20 MG tablet  Commonly known as: PRINIVIL,ZESTRIL   20 mg, Oral, Daily      metFORMIN 1000 MG tablet  Commonly known as: GLUCOPHAGE   TAKE 1 TABLET BY MOUTH TWICE DAILY WITH MEALS      multivitamin tablet tablet   1 tablet, Nightly      nebivolol 5 MG tablet  Commonly known as: BYSTOLIC   5 mg, Oral, Daily      pantoprazole 40 MG EC tablet  Commonly known as: PROTONIX   40 mg, Oral, Daily      rosuvastatin 10 MG tablet  Commonly known as: CRESTOR   10 mg, Oral, Nightly      Rybelsus 14 MG tablet  Generic drug:  Semaglutide   14 mg, Oral, Every Morning Before Breakfast               Discharge Diet:   Diet Instructions       Diet: Cardiac Diets, Diabetic Diets; Low Sodium (2g); Thin (IDDSI 0); Consistent Carbohydrate      Discharge Diet:  Cardiac Diets  Diabetic Diets       Cardiac Diet: Low Sodium (2g)    Fluid Consistency: Thin (IDDSI 0)    Diabetic Diet: Consistent Carbohydrate            Activity at Discharge:     Follow-up Appointments  Future Appointments   Date Time Provider Department Center   2/7/2025  9:45 AM Narinder Ortiz MD [facility]  [facility]   12/9/2025  9:15 AM Seipel, Joseph F, MD [facility]     Additional Instructions for the Follow-ups that You Need to Schedule       Discharge Follow-up with PCP   As directed       Currently Documented PCP:    Narinder Ortiz MD    PCP Phone Number:    [phone]     Follow Up Details: 5 to 7 days        Discharge Follow-up with Specified Provider: Cardiology; 2 Weeks   As directed      To: Cardiology   Follow Up: 2 Weeks                Test Results Pending at Discharge  Pending Results       None             Risk for Readmission (LACE) Score: 4 (12/25/2024  6:00 AM)      Greater than 30 minutes spent in discharge activities for this patient    Signature:Electronically signed by Shaggy Jon PA-C, 12/25/24, 10:58 AM EST.

## 2024-12-25 NOTE — PLAN OF CARE
Goal Outcome Evaluation:      Patient cleared by Dr. Gonzalez the cardiologist to discharge. Dr. Gonzalez wants the patient sent home with IMDUR 30mg daily, Aspirin 81mg daily, Lasix 20mg daily, and to follow up in two weeks with Yaritza MUSA.

## 2024-12-25 NOTE — PLAN OF CARE
Problem: Adult Inpatient Plan of Care  Goal: Plan of Care Review  Outcome: Not Progressing  Flowsheets (Taken 12/24/2024 2142)  Progress: improving  Plan of Care Reviewed With: patient  Goal: Patient-Specific Goal (Individualized)  Outcome: Not Progressing  Goal: Absence of Hospital-Acquired Illness or Injury  Outcome: Not Progressing  Goal: Optimal Comfort and Wellbeing  Outcome: Not Progressing  Goal: Readiness for Transition of Care  Outcome: Not Progressing     Problem: Chest Pain  Goal: Resolution of Chest Pain Symptoms  Outcome: Not Progressing     Problem: Fall Injury Risk  Goal: Absence of Fall and Fall-Related Injury  Outcome: Not Progressing     Problem: Nausea and Vomiting  Goal: Nausea and Vomiting Relief  Outcome: Not Progressing   Goal Outcome Evaluation:  Plan of Care Reviewed With: patient        Progress: improving

## 2024-12-25 NOTE — CASE MANAGEMENT/SOCIAL WORK
Case Management Discharge Note      Final Note: Home.         Selected Continued Care - Discharged on 12/25/2024 Admission date: 12/24/2024 - Discharge disposition: Home or Self Care     Transportation Services  Private: Car    Final Discharge Disposition Code: 01 - home or self-care

## 2024-12-25 NOTE — ED NOTES
Nursing report ED to floor  Qasim Ch  54 y.o.  male    HPI:   Chief Complaint   Patient presents with    Chest Pain       Admitting doctor:   Nicanor Rebolledo MD    Admitting diagnosis:   The primary encounter diagnosis was Shortness of breath. A diagnosis of Chest pain, unspecified type was also pertinent to this visit.    Code status:   Current Code Status       Date Active Code Status Order ID Comments User Context       12/24/2024 1756 CPR (Attempt to Resuscitate) [id number]  Ne Looney PA-C ED        Question Answer    Code Status (Patient has no pulse and is not breathing) CPR (Attempt to Resuscitate)    Medical Interventions (Patient has pulse or is breathing) Full Support    Level Of Support Discussed With Patient                    Allergies:   Penicillin g    Isolation:  No active isolations     Fall Risk:  Fall Risk Assessment was completed, and patient is at moderate risk for falls.   Predictive Model Details         7 (Low) Factor Value    Calculated 12/24/2024 19:45 Age 54    Risk of Fall Model Active Peripheral IV Present     Imaging order in this encounter Present     Number of Distinct Medication Classes administered 6     Respiratory Rate 18     Magnesium not on file     Diastolic BP 69     Terrence Scale not on file     Number of administrations of Analgesic Narcotics 2     Clinically Relevant Sex Not Female     Cardiac Assessment X     Total Bilirubin 0.6 mg/dL     Days after Admission 0.211     Albumin 4.3 g/dL     Chloride 106 mmol/L     Potassium 4.4 mmol/L     Creatinine 0.87 mg/dL     Tobacco Use Quit     ALT 20 U/L     Calcium 9.6 mg/dL         Weight:       12/24/24  1439   Weight: 131 kg (288 lb 9.3 oz)       Intake and Output  No intake or output data in the 24 hours ending 12/24/24 1954    Diet:   Dietary Orders (From admission, onward)       Start     Ordered    12/25/24 0001  NPO Diet NPO Type: Strict NPO  Diet Effective Midnight        Question:  NPO Type  Answer:   Strict NPO    12/24/24 1756    12/24/24 1755  Diet: Cardiac, Diabetic; Healthy Heart (2-3 Na+); Consistent Carbohydrate; Fluid Consistency: Thin (IDDSI 0)  Diet Effective Now        References:    Diet Order Definitions   Question Answer Comment   Diets: Cardiac    Diets: Diabetic    Cardiac Diet: Healthy Heart (2-3 Na+)    Diabetic Diet: Consistent Carbohydrate    Fluid Consistency: Thin (IDDSI 0)        12/24/24 1756                     Most recent vitals:   Vitals:    12/24/24 1749 12/24/24 1836 12/24/24 1915 12/24/24 1930   BP: 148/78 148/88 133/81 122/69   BP Location:       Patient Position:       Pulse: 73 72 71 67   Resp:  18     Temp:       TempSrc:       SpO2: 91% 95% 94% 92%   Weight:       Height:           Active LDAs/IV Access:   Lines, Drains & Airways       Active LDAs       Name Placement date Placement time Site Days    Peripheral IV 12/24/24 1527 Right Antecubital 12/24/24  1527  Antecubital  less than 1                    Skin Condition:   Skin Assessments (last day)       None             Labs (abnormal labs have a star):   Labs Reviewed   PROTIME-INR - Abnormal; Notable for the following components:       Result Value    Protime 14.3 (*)     INR 1.11 (*)     All other components within normal limits   APTT - Abnormal; Notable for the following components:    PTT 35.5 (*)     All other components within normal limits   D-DIMER, QUANTITATIVE - Abnormal; Notable for the following components:    D-Dimer, Quantitative 0.65 (*)     All other components within normal limits    Narrative:     According to the assay 's published package insert, a normal (<0.50 MCGFEU/mL) D-dimer result in conjunction with a non-high clinical probability assessment, excludes deep vein thrombosis (DVT) and pulmonary embolism (PE) with high sensitivity.    D-dimer values increase with age and this can make VTE exclusion of an older population difficult. To address this, the [employer], based  "on best available evidence and recent guidelines, recommends that clinicians use age-adjusted D-dimer thresholds in patients greater than 50 years of age with: a) a low probability of PE who do not meet all Pulmonary Embolism Rule Out Criteria, or b) in those with intermediate probability of PE.   The formula for an age-adjusted D-dimer cut-off is \"age/100\".  For example, a 60 year old patient would have an age-adjusted cut-off of 0.60 MCGFEU/mL and an 80 year old 0.80 MCGFEU/mL.   CBC WITH AUTO DIFFERENTIAL - Abnormal; Notable for the following components:    Platelets 107 (*)     All other components within normal limits   RESPIRATORY PANEL PCR W/ COVID-19 (SARS-COV-2), NP SWAB IN UTM/VTP, 2 HR TAT - Normal    Narrative:     In the setting of a positive respiratory panel with a viral infection PLUS a negative procalcitonin without other underlying concern for bacterial infection, consider observing off antibiotics or discontinuation of antibiotics and continue supportive care. If the respiratory panel is positive for atypical bacterial infection (Bordetella pertussis, Chlamydophila pneumoniae, or Mycoplasma pneumoniae), consider antibiotic de-escalation to target atypical bacterial infection.   TROPONIN - Normal    Narrative:     High Sensitive Troponin T Reference Range:  <14.0 ng/L- Negative Female for AMI  <22.0 ng/L- Negative Male for AMI  >=14 - Abnormal Female indicating possible myocardial injury.  >=22 - Abnormal Male indicating possible myocardial injury.   Clinicians would have to utilize clinical acumen, EKG, Troponin, and serial changes to determine if it is an Acute Myocardial Infarction or myocardial injury due to an underlying chronic condition.        SCAN SLIDE - Normal    Narrative:     No visible platelet clumping.   HIGH SENSITIVITIY TROPONIN T 1HR - Normal    Narrative:     High Sensitive Troponin T Reference Range:  <14.0 ng/L- Negative Female for AMI  <22.0 ng/L- Negative Male for AMI  >=14 " - Abnormal Female indicating possible myocardial injury.  >=22 - Abnormal Male indicating possible myocardial injury.   Clinicians would have to utilize clinical acumen, EKG, Troponin, and serial changes to determine if it is an Acute Myocardial Infarction or myocardial injury due to an underlying chronic condition.        BNP (IN-HOUSE) - Normal    Narrative:     This assay is used as an aid in the diagnosis of individuals suspected of having heart failure. It can be used as an aid in the diagnosis of acute decompensated heart failure (ADHF) in patients presenting with signs and symptoms of ADHF to the emergency department (ED). In addition, NT-proBNP of <300 pg/mL indicates ADHF is not likely.    Age Range Result Interpretation  NT-proBNP Concentration (pg/mL:      <50             Positive            >450                   Gray                 300-450                    Negative             <300    50-75           Positive            >900                  Gray                300-900                  Negative            <300      >75             Positive            >1800                  Gray                300-1800                  Negative            <300   COMPREHENSIVE METABOLIC PANEL    Narrative:     GFR Categories in Chronic Kidney Disease (CKD)      GFR Category          GFR (mL/min/1.73)    Interpretation  G1                     90 or greater         Normal or high (1)  G2                      60-89                Mild decrease (1)  G3a                   45-59                Mild to moderate decrease  G3b                   30-44                Moderate to severe decrease  G4                    15-29                Severe decrease  G5                    14 or less           Kidney failure          (1)In the absence of evidence of kidney disease, neither GFR category G1 or G2 fulfill the criteria for CKD.    eGFR calculation 2021 CKD-EPI creatinine equation, which does not include race as a factor   CBC AND  DIFFERENTIAL    Narrative:     The following orders were created for panel order CBC & Differential.  Procedure                               Abnormality         Status                     ---------                               -----------         ------                     CBC Auto Differential[[id number]]        Abnormal            Final result               Scan Slide[[id number]]                   Normal              Final result                 Please view results for these tests on the individual orders.       LOC: Person, Place, Time, and Situation    Telemetry:  Observation Unit    Cardiac Monitoring Ordered: yes    EKG:   ECG 12 Lead Chest Pain   Preliminary Result   HEART RATE=73  bpm   RR Yblhhzdz=312  ms   MT Jcutcmov=095  ms   P Horizontal Axis=3  deg   P Front Axis=29  deg   QRSD Interval=97  ms   QT Jfsaxvju=506  ms   PLrF=091  ms   QRS Axis=-6  deg   T Wave Axis=39  deg   - ABNORMAL ECG -   Sinus rhythm   Ventricular premature complex   Probable left atrial enlargement   Inferior infarct, old   Date and Time of Study:2024-12-24 14:43:06          Medications Given in the ED:   Medications   sodium chloride 0.9 % flush 10 mL (has no administration in time range)   nitroglycerin (NITROSTAT) SL tablet 0.4 mg (0.4 mg Sublingual Given 12/24/24 1535)   sodium chloride 0.9 % flush 10 mL (has no administration in time range)   sodium chloride 0.9 % flush 10 mL (has no administration in time range)   sodium chloride 0.9 % infusion 40 mL (has no administration in time range)   Enoxaparin Sodium (LOVENOX) syringe 40 mg (40 mg Subcutaneous Not Given 12/24/24 1850)   acetaminophen (TYLENOL) tablet 650 mg (has no administration in time range)     Or   acetaminophen (TYLENOL) 160 MG/5ML oral solution 650 mg (has no administration in time range)     Or   acetaminophen (TYLENOL) suppository 650 mg (has no administration in time range)   HYDROcodone-acetaminophen (NORCO) 5-325 MG per tablet 1 tablet (has no  administration in time range)   sennosides-docusate (PERICOLACE) 8.6-50 MG per tablet 2 tablet (has no administration in time range)     And   polyethylene glycol (MIRALAX) packet 17 g (has no administration in time range)     And   bisacodyl (DULCOLAX) EC tablet 5 mg (has no administration in time range)     And   bisacodyl (DULCOLAX) suppository 10 mg (has no administration in time range)   ondansetron ODT (ZOFRAN-ODT) disintegrating tablet 4 mg (has no administration in time range)     Or   ondansetron (ZOFRAN) injection 4 mg (has no administration in time range)   metFORMIN (GLUCOPHAGE) tablet 1,000 mg (1,000 mg Oral Given 12/24/24 1823)   rosuvastatin (CRESTOR) tablet 10 mg (has no administration in time range)   gabapentin (NEURONTIN) capsule 300 mg (has no administration in time range)   aspirin tablet 325 mg (325 mg Oral Given 12/24/24 1538)   morphine injection 4 mg (4 mg Intravenous Given 12/24/24 1609)   ondansetron (ZOFRAN) injection 4 mg (4 mg Intravenous Given 12/24/24 1609)   iopamidol (ISOVUE-370) 76 % injection 100 mL (100 mL Intravenous Given 12/24/24 1700)   HYDROmorphone (DILAUDID) injection 0.5 mg (0.5 mg Intravenous Given 12/24/24 1711)       Imaging results:  CT Angiogram Chest Pulmonary Embolism    Result Date: 12/24/2024  Impression: No pulmonary embolism. Mild diffuse interstitial thickening, most significantly within the dependent portions of the lungs, is concerning for mild pulmonary edema. Cirrhotic liver morphology. Electronically Signed: Sandoval Thomas DO  12/24/2024 5:19 PM EST  Workstation ID: [id number]    XR Chest 1 View    Result Date: 12/24/2024  Impression: Mild cardiac prominence with pulmonary vascular congestion and mixed interstitial and airspace opacities suggestive of pulmonary edema/CHF. Electronically Signed: Ana Maria Kidd MD  12/24/2024 3:29 PM EST  Workstation ID: [id number]     Social issues:   Social History     Socioeconomic History    Marital status:      Number of children: 2   Tobacco Use    Smoking status: Former     Current packs/day: 0.00     Average packs/day: 0.5 packs/day for 10.0 years (5.0 ttl pk-yrs)     Types: Cigarettes     Start date: 2002     Quit date: 2012     Years since quittin.9    Smokeless tobacco: Current     Types: Chew   Vaping Use    Vaping status: Never Used   Substance and Sexual Activity    Alcohol use: Not Currently    Drug use: Never    Sexual activity: Not Currently     Partners: Female       NIH Stroke Scale:  Interval: (not recorded)  1a. Level of Consciousness: (not recorded)  1b. LOC Questions: (not recorded)  1c. LOC Commands: (not recorded)  2. Best Gaze: (not recorded)  3. Visual: (not recorded)  4. Facial Palsy: (not recorded)  5a. Motor Arm, Left: (not recorded)  5b. Motor Arm, Right: (not recorded)  6a. Motor Leg, Left: (not recorded)  6b. Motor Leg, Right: (not recorded)  7. Limb Ataxia: (not recorded)  8. Sensory: (not recorded)  9. Best Language: (not recorded)  10. Dysarthria: (not recorded)  11. Extinction and Inattention (formerly Neglect): (not recorded)    Total (NIH Stroke Scale): (not recorded)     Additional notable assessment information:     Nursing report ED to floor:    Orion Grimm RN   24 19:54 EST

## 2024-12-26 ENCOUNTER — TRANSITIONAL CARE MANAGEMENT TELEPHONE ENCOUNTER (OUTPATIENT)
Dept: CALL CENTER | Facility: HOSPITAL | Age: 54
End: 2024-12-26
Payer: COMMERCIAL

## 2024-12-26 ENCOUNTER — TELEPHONE (OUTPATIENT)
Dept: CARDIOLOGY | Facility: CLINIC | Age: 54
End: 2024-12-26
Payer: COMMERCIAL

## 2024-12-26 LAB
QT INTERVAL: 387 MS
QTC INTERVAL: 426 MS

## 2024-12-26 NOTE — TELEPHONE ENCOUNTER
"     Caller: Qasim Ch \"FLORY\"    Relationship to patient: Self    Best call back number:  [phone]    Patient is needing: PATIENT CALLED TO SCHEDULE TESTING THAT [facility] PUT IN FOR HIM IN THE HOSPITAL. DID NOT SEE ANY TESTING ORDERS. DISCHARGE SAID TO SCHEDULE HOSPITAL FOLLOW UP FOR 2 WEEKS WITH MICHELE, NO AVAILABILITY IN FEBRUARY.       PATIENT HAS NOT PICKED UP MEDS YET BUT BP WAS RUNNING 155/95 THIS MORNING WITH PULSE OF 65       "

## 2024-12-26 NOTE — OUTREACH NOTE
Call Center TCM Note      Flowsheet Row Responses   [facility] patient discharged from? [facility]   Does the patient have one of the following disease processes/diagnoses(primary or secondary)? Other   TCM attempt successful? Yes   Call start time 1133   Call end time 1137   Discharge diagnosis Chest pain   Meds reviewed with patient/caregiver? Yes   Is the patient having any side effects they believe may be caused by any medication additions or changes? No   Does the patient have all medications ordered at discharge? Yes   Is the patient taking all medications as directed (includes completed medication regime)? Yes   Medication comments Meds to be picked up from pharm when ready   Comments Pt declined HOSP DC FU appt with PCP at this time.   Does the patient have an appointment with their PCP within 7-14 days of discharge? No   Nursing Interventions Patient declined scheduling/rescheduling appointment at this time   Has home health visited the patient within 72 hours of discharge? N/A   Psychosocial issues? No   Did the patient receive a copy of their discharge instructions? Yes   Nursing interventions Reviewed instructions with patient   What is the patient's perception of their health status since discharge? Improving   Is the patient/caregiver able to teach back signs and symptoms related to disease process for when to call PCP? Yes   Is the patient/caregiver able to teach back signs and symptoms related to disease process for when to call 911? Yes   Is the patient/caregiver able to teach back the hierarchy of who to call/visit for symptoms/problems? PCP, Specialist, Home health nurse, Urgent Care, ED, 911 Yes   TCM call completed? Yes   Wrap up additional comments Pt reports some improvement. Pt declines PCP Fu appt as he is changing Providers. Pt did take down number of [facility] to assist.   Call end time 1137            Kadi Howard RN    12/26/2024, 11:37 EST

## 2024-12-27 ENCOUNTER — TELEPHONE (OUTPATIENT)
Dept: CARDIOLOGY | Facility: CLINIC | Age: 54
End: 2024-12-27
Payer: COMMERCIAL

## 2024-12-27 DIAGNOSIS — R07.89 ATYPICAL CHEST PAIN: Primary | ICD-10-CM

## 2024-12-27 RX ORDER — METOPROLOL TARTRATE 50 MG
TABLET ORAL
Qty: 2 TABLET | Refills: 0 | Status: SHIPPED | OUTPATIENT
Start: 2024-12-27

## 2024-12-27 NOTE — TELEPHONE ENCOUNTER
"    Caller: Qasim Ch \"FLORY\"    Relationship to patient: Self    Best call back number: [phone]     Patient is needing: SIVA VARMA CANNOT DO CTA SCAN UNTIL JAN 27TH SO HE CALLED [facility] IN [place] AND THEY ARE ABLE TO FIT HIM IN SOONER. IS PT OKAY TO DO THIS TEST AT [facility]? PLS CALL AND ADVISE.           "

## 2024-12-27 NOTE — TELEPHONE ENCOUNTER
Plan:   Patient has been ruled out for ACS.  Will give IV Lasix 20 mg x 1 now.  Will start Imdur and plan outpatient CT coronary angiogram.  Will tentatively plan to place ambulatory cardiac monitor for quantification of PVCs.  This will not be able to be performed today being the holiday.  Follow-up with Dr. Gonzalez in 2 weeks.  Case has been discussed with Dr. Gonzalez.    Per the on-call APRN note from [facility] this was discussed but the CTA coronary was never ordered. Can you please place the order so the patient can get scheduled.

## 2025-01-20 DIAGNOSIS — E11.9 TYPE 2 DIABETES MELLITUS WITHOUT COMPLICATION, WITHOUT LONG-TERM CURRENT USE OF INSULIN: ICD-10-CM

## 2025-01-20 RX ORDER — ORAL SEMAGLUTIDE 14 MG/1
1 TABLET ORAL
Qty: 30 TABLET | Refills: 2 | Status: SHIPPED | OUTPATIENT
Start: 2025-01-20

## 2025-01-20 NOTE — PROGRESS NOTES
HEMATOLOGY ONCOLOGY OUTPATIENT CONSULTATION       Patient name: Qasim Ch  : 1970  MRN: [id number]  Primary Care Physician: Narinder Ortiz MD  Referring Physician: Mitesh Craig, SD, APRN  Reason For Consult: Thrombocytopenia.    History of Present Illness:    2025: In the office for the first time to investigate the gait and treat thrombocytopenia.  He carried a long history of medical problems including hypertension, hypercholesterolemia and a suggestion of cirrhosis of the liver.  He also had undergone surgeries for back pain.  He was being treated for sleep apnea.  Several imaging studies had revealed architecture and morphology of the liver consistent with cirrhosis.  However sometime in  he was seen by a hepatologist and a liver biopsy was obtained.  The final report was of minimal steatosis with sinusoidal dilatation.  There was evidence of scattered nonspecific chronic inflammation but there was not excess iron.  There was periportal fibrosis with focal septa and architectural distortion.  Cirrhosis was not identified.  Nevertheless, in addition, the scans had revealed evidence of portal hypertension and splenomegaly.  No further investigations were undertaken after that.  On review of the records, at least since , intermittently, he had had thrombocytopenia.  However in the months preceding this visit, his platelet count has been dropping progressively.  At the time of this visit the platelet count was less than 80,000/mm³.  He had not had any bleeding, easy bruisability or petechiae.  He had been afebrile and without unintended weight loss.  He denied chest pains, cough or abdominal pain.  He had maintained regular bowel activity and denies dysuria or hematuria.  No melena or hematochezia.  No peripheral edema.  Family history reviewed and discussed.  On exam alert and conversant.  Oriented and in no distress.  No jaundice.  Does appear chronically ill.   Lungs clear.  Heart regular.  Abdomen protuberant and soft.  I believe I can palpate the edge of the liver approximately 3 cm below the costal margin.  I definitely could palpate the spleen approximately 5 cm below the costal margin.  Reviewed all the laboratory exams, all the imaging studies and reports of pathology and discussed with him.  I suspect immune thrombocytopenic purpura but he is splenomegaly most likely plays a role.  However the progressive decline in the platelets requires additional investigation.  Explained the plans.  To have a blood count checked every week until I see him again in approximately 3 weeks with laboratory exams.    Past Medical History:   Diagnosis Date    Bilateral leg cramps     AT     Colon polyps     FOLLOWED BY DR. SERA LE    DDD (degenerative disc disease), lumbar 2018    L3,L4,L5    Diabetes     TYPE 2, NIDDM    Disorder of gallbladder     Diverticulitis 09/2016    SIGMOID    Diverticulitis of colon 04/18/2014    Fatty liver     GERD (gastroesophageal reflux disease)     Hyperlipidemia     Hypertension     Hypertriglyceridemia     Hypogonadism 02/07/2013    Impotence of organic origin 02/22/2012    Irritable bowel syndrome 01/14/2013    Lumbar compression fracture     L4    Lumbar radiculopathy     Muscle spasm of left calf     Numbness and tingling of foot     LEFT FOOT, S/P BACK SURGERY    Obesity     WOLF (obstructive sleep apnea)     cpap    Plantar fasciitis 06/09/2016    Polycythemia 02/22/2012    Post laminectomy syndrome     Right sided abdominal pain 02/28/2021    SEEN AT [facility]    RUQ pain 05/02/2021    SEEN AT [facility]    Spinal stenosis of lumbar region without neurogenic claudication 09/05/2018    Splenomegaly 05/2021    Spondylolisthesis      Past Surgical History:   Procedure Laterality Date    ANAL FISSURECTOMY N/A 04/15/2009    DR.RAYMOND MICHAELS AT [facility]    CHOLECYSTECTOMY  2022    CHOLECYSTECTOMY WITH INTRAOPERATIVE CHOLANGIOGRAM N/A 06/23/2021     Procedure: CHOLECYSTECTOMY LAPAROSCOPIC INTRAOPERATIVE CHOLANGIOGRAM;  Surgeon: Chris Carrion Jr., MD;  Location: [facility] MAIN OR;  Service: General;  Laterality: N/A;    COLON SURGERY N/A 09/16/2016    SIGMOID COLECTOMY, D/T DIVERTICULITIS, DR. TYLER DUNBAR AT [facility]    COLONOSCOPY N/A 04/15/2009    MIXED ADENOMATOUS POLYP IN RECTUM, DR. HERLINDA MICHAELS AT [facility]    COLONOSCOPY N/A 01/28/2021    7 MM HYPERPLASTIC POLYP IN RECTUM, MULTIPLE SMALL AND LARGE DIVERTICULA IN ENTIRE COLON, RESCOPE IN 5 YRS, DR. SERA LE AT [facility]    COLONOSCOPY W/ POLYPECTOMY N/A 12/11/2013    2 TUBULOVILLOUS ADENOMA POLYPS IN CECUM, 8 MM TUBULOVILLOUS ADENOMA POLYP IN ASCENDING, 14 MM TUBULOVILLOUS ADENOMA POLYP IN DISTAL ASCENDING, 5 MM TUBULAR ADENOMA POLYP IN ASCENDING, 5 MM BENIGN POLYP IN RECTUM, RESCOPE IN 2 YRS, DR. SERA LE AT [facility]    ENDOSCOPY AND COLONOSCOPY N/A 2014    HEMORRHOIDECTOMY N/A 04/15/2009    DR.RAYMOND MICHAELS AT [facility]    HERNIA REPAIR Left 09/14/2017    lap left inguinal hernia repair, ventral hernia repair, DR. TYLER DUNBAR AT [facility]    LUMBAR EPIDURAL INJECTION N/A     lumbar    LUMBAR SPINE POSTERIOR N/A 12/03/2018    TLIF L3-L5, DR. MOHAMMAD MAJD AT [facility]     TONSILLECTOMY Bilateral     UPPER GASTROINTESTINAL ENDOSCOPY      VENTRAL HERNIA REPAIR N/A 03/01/2018    LAPAROSCOPIC WITH PREVIOUS MESH REMOVAL, DR. TYLER DUNBAR AT [facility]       Current Outpatient Medications:     acetaminophen (TYLENOL) 500 MG tablet, Take 1 tablet by mouth Every 6 (Six) Hours As Needed., Disp: , Rfl:     albuterol sulfate  (90 Base) MCG/ACT inhaler, Inhale 2 puffs Every 6 (Six) Hours As Needed., Disp: , Rfl:     aspirin 81 MG EC tablet, Take 1 tablet by mouth Daily., Disp: 30 tablet, Rfl: 0    gabapentin (NEURONTIN) 300 MG capsule, Take 1 capsule by mouth 2 (Two) Times a Day., Disp: , Rfl:     isosorbide mononitrate (IMDUR) 30 MG 24 hr tablet, Take 1 tablet by mouth Daily for 30 days., Disp: 30 tablet, Rfl: 0    lisinopril  (PRINIVIL,ZESTRIL) 20 MG tablet, TAKE 1 TABLET BY MOUTH DAILY, Disp: 90 tablet, Rfl: 1    metFORMIN (GLUCOPHAGE) 1000 MG tablet, TAKE 1 TABLET BY MOUTH TWICE DAILY WITH MEALS, Disp: 180 tablet, Rfl: 1    metoprolol tartrate (LOPRESSOR) 50 MG tablet, Take 50 mg at Bedtime the Night Before Coronary CTA Appointment and In the Morning 1 Hour Prior to Coronary CTA Appointment. Do not take if heart rate less than 60., Disp: 2 tablet, Rfl: 0    multivitamin (THERAGRAN) tablet tablet, Take 1 tablet by mouth Every Night. HOLD FOR SURGERY, Disp: , Rfl:     pantoprazole (PROTONIX) 40 MG EC tablet, TAKE 1 TABLET BY MOUTH DAILY, Disp: 90 tablet, Rfl: 1    rosuvastatin (CRESTOR) 10 MG tablet, Take 1 tablet by mouth Every Night., Disp: 90 tablet, Rfl: 1    Semaglutide (Rybelsus) 14 MG tablet, TAKE 1 TABLET BY MOUTH EVERY MORNING BEFORE BREAKFAST, Disp: 30 tablet, Rfl: 2    azithromycin (ZITHROMAX) 250 MG tablet, Take 2 tablets the first day, then 1 tablet daily for 4 days., Disp: 6 tablet, Rfl: 0    furosemide (Lasix) 20 MG tablet, Take 1 tablet by mouth Daily for 30 days. (Patient not taking: Reported on 2025), Disp: 30 tablet, Rfl: 0    nebivolol (BYSTOLIC) 5 MG tablet, TAKE 1 TABLET BY MOUTH DAILY (Patient not taking: Reported on 2025), Disp: 90 tablet, Rfl: 1    Allergies   Allergen Reactions    Penicillin G Anaphylaxis     Family History   Problem Relation Age of Onset    Colon cancer Father 74    Malig Hyperthermia Neg Hx     Thyroid cancer Neg Hx     Liver disease Neg Hx      Cancer-related family history includes Colon cancer (age of onset: 74) in his father. There is no history of Thyroid cancer.    Social History     Tobacco Use    Smoking status: Former     Current packs/day: 0.00     Average packs/day: 0.5 packs/day for 10.0 years (5.0 ttl pk-yrs)     Types: Cigarettes     Start date: 2002     Quit date: 2012     Years since quittin.0    Smokeless tobacco: Current     Types: Chew   Vaping Use     Vaping status: Never Used   Substance Use Topics    Alcohol use: Not Currently    Drug use: Never     Social History     Social History Narrative     2nd marriage (2020)     ROS:   Review of Systems   Constitutional:  Negative for activity change, appetite change, chills, diaphoresis, fatigue, fever and unexpected weight change.   HENT:  Negative for congestion, dental problem, drooling, ear discharge, ear pain, facial swelling, hearing loss, mouth sores, nosebleeds, postnasal drip, rhinorrhea, sinus pressure, sinus pain, sneezing, sore throat, tinnitus, trouble swallowing and voice change.    Eyes:  Negative for photophobia, pain, discharge, redness, itching and visual disturbance.   Respiratory:  Negative for apnea, cough, choking, chest tightness, shortness of breath, wheezing and stridor.    Cardiovascular:  Negative for chest pain, palpitations and leg swelling.   Gastrointestinal:  Negative for abdominal distention, abdominal pain, anal bleeding, blood in stool, constipation, diarrhea, nausea, rectal pain and vomiting.   Endocrine: Negative for cold intolerance, heat intolerance, polydipsia and polyuria.   Genitourinary:  Negative for decreased urine volume, difficulty urinating, dysuria, flank pain, frequency, genital sores, hematuria and urgency.   Musculoskeletal:  Negative for arthralgias, back pain, gait problem, joint swelling, myalgias, neck pain and neck stiffness.   Skin:  Negative for color change, pallor and rash.   Neurological:  Negative for dizziness, tremors, seizures, syncope, facial asymmetry, speech difficulty, weakness, light-headedness, numbness and headaches.   Hematological:  Negative for adenopathy. Does not bruise/bleed easily.   Psychiatric/Behavioral:  Negative for agitation, behavioral problems, confusion, decreased concentration, hallucinations, self-injury, sleep disturbance and suicidal ideas. The patient is not nervous/anxious.        Objective:    Vital Signs:  Vitals:  "   01/23/25 1149   BP: 114/62   Pulse: 84   Resp: 18   Temp: 98.2 °F (36.8 °C)   SpO2: 97%   Weight: 131 kg (288 lb 6.4 oz)   Height: 185.4 cm (73\")   PainSc: 0-No pain     Body mass index is 38.05 kg/m².    ECOG  (1) Restricted in physically strenuous activity, ambulatory and able to do work of light nature    Physical Exam:   Physical Exam  Constitutional:       General: He is not in acute distress.     Appearance: He is ill-appearing. He is not toxic-appearing or diaphoretic.   HENT:      Head: Normocephalic and atraumatic.      Right Ear: External ear normal.      Left Ear: External ear normal.      Nose: Nose normal.      Mouth/Throat:      Mouth: Mucous membranes are moist.      Pharynx: Oropharynx is clear.   Eyes:      General: No scleral icterus.        Right eye: No discharge.         Left eye: No discharge.      Conjunctiva/sclera: Conjunctivae normal.      Pupils: Pupils are equal, round, and reactive to light.   Cardiovascular:      Rate and Rhythm: Normal rate and regular rhythm.      Pulses: Normal pulses.      Heart sounds: Normal heart sounds. No murmur heard.     No friction rub. No gallop.   Pulmonary:      Effort: No respiratory distress.      Breath sounds: No stridor. No wheezing, rhonchi or rales.   Chest:      Chest wall: No tenderness.   Abdominal:      General: Bowel sounds are normal. There is no distension.      Palpations: Abdomen is soft. There is no mass.      Tenderness: There is no abdominal tenderness. There is no right CVA tenderness, left CVA tenderness, guarding or rebound.      Comments: Protuberant, soft.  The liver can be palpated approximately 3 cm below the costal margin.  The spleen seems palpable approximately 5 cm below the costal margin.   Musculoskeletal:         General: No swelling, tenderness, deformity or signs of injury.      Cervical back: No rigidity.      Right lower leg: No edema.      Left lower leg: No edema.   Lymphadenopathy:      Cervical: No cervical " adenopathy.   Skin:     General: Skin is warm and dry.      Coloration: Skin is not jaundiced or pale.      Findings: No bruising or rash.   Neurological:      General: No focal deficit present.      Mental Status: He is alert and oriented to person, place, and time.      Cranial Nerves: No cranial nerve deficit.   Psychiatric:         Mood and Affect: Mood normal.         Behavior: Behavior normal.         Thought Content: Thought content normal.         Judgment: Judgment normal.     FELICITAS Ortiz MD performed the physical exam on 1/23/2025 as documented above.    Lab Results - Last 18 Months   Lab Units 01/23/25  1144 12/25/24  0357 12/24/24  1527   WBC 10*3/mm3 11.17* 7.01 7.89   HEMOGLOBIN g/dL 14.8 12.9* 13.7   HEMATOCRIT % 42.6 37.5 39.5   PLATELETS 10*3/mm3 77* 85* 107*   MCV fL 92.4 94.0 92.9     Lab Results - Last 18 Months   Lab Units 01/23/25  1238 12/25/24  0357 12/24/24  1527 09/03/24  1552   SODIUM mmol/L 139 138 139 141   POTASSIUM mmol/L 4.3 4.0 4.4 4.1   CHLORIDE mmol/L 101 106 106 101   CO2 mmol/L 28.6 24.6 24.5 25.8   BUN mg/dL 16 17 18 15   CREATININE mg/dL 0.92 0.78 0.87 0.84   CALCIUM mg/dL 9.9 8.9 9.6 9.7   BILIRUBIN mg/dL 1.2  --  0.6 0.6   ALK PHOS U/L 63  --  49 61   ALT (SGPT) U/L 19  --  20 24   AST (SGOT) U/L 29  --  29 24   GLUCOSE mg/dL 82 82 81 70     Lab Results   Component Value Date    GLUCOSE 82 01/23/2025    BUN 16 01/23/2025    CREATININE 0.92 01/23/2025    EGFRIFNONA 101 08/31/2021    EGFRIFAFRI >150 05/04/2021    BCR 17.4 01/23/2025    K 4.3 01/23/2025    CO2 28.6 01/23/2025    CALCIUM 9.9 01/23/2025    PROTENTOTREF 7.2 09/03/2024    ALBUMIN 4.4 01/23/2025    LABIL2 1.8 09/03/2024    AST 29 01/23/2025    ALT 19 01/23/2025     Lab Results - Last 18 Months   Lab Units 12/24/24  1527   INR  1.11*   APTT seconds 35.5*     Lab Results   Component Value Date    IRON 128 05/02/2023    FERRITIN 138 05/02/2023     LDH   Date Value Ref Range Status   05/04/2021 144 135 - 225 U/L  Final     Uric Acid   Date Value Ref Range Status   05/04/2021 7.0 3.4 - 7.0 mg/dL Final     Lab Results   Component Value Date    SEDRATE 46 (H) 05/04/2021     Lab Results   Component Value Date    PTT 35.5 (H) 12/24/2024    INR 1.11 (H) 12/24/2024     Lab Results   Component Value Date    PSA 0.356 12/14/2023     Lab Results   Component Value Date    SEDRATE 46 (H) 05/04/2021      Assessment & Plan     1.  Thrombocytopenia: I suspect that the problem is the result of several factors.  On 1 hand the portal hypertension and splenomegaly participates.  However, the recent drop in the platelet count cannot easily be explained with that.  Immune thrombocytopenic purpura is possible.  He has been tested in the past for hepatitis C but I will recheck.  In addition I have asked for HIV serology and discussed with him the rationale.  Requested as well testing for lupus anticoagulant and an ultrasound of the spleen.  I have asked him to see me with the results.  Between now and then he will have weekly blood counts.  Discussed with him the risks of thrombocytopenia.  He is far from being at risk of bleeding at this time.  2.  Chronic liver disease with portal hypertension and splenomegaly: The cause of this is unclear.  Whether he has cirrhosis or not is not entirely clear although it has been suggested that he does not.  Might need further evaluation.  3.  Class II obesity with a body mass index of 38 kg/m².  4.  The rest of the blood count tends to be unremarkable.  Previous blood counts have revealed a normal white cell count with unremarkable differential and adequate hemoglobin with normal cellular red cells.  5.  I have reviewed all the records available and all laboratory exams.  Reviewed all the imaging studies and the reports of pathology.  Discussed with him and with his spouse.  Discussed the significance of thrombocytopenia.  6.  He is to see me in a few weeks with results.    Yemi Ortiz MD on 1/23/2025 at  1727.

## 2025-01-21 LAB
CV ZIO BASELINE AVG BPM: 75
CV ZIO BASELINE BPM HIGH: 207
CV ZIO BASELINE BPM LOW: 57

## 2025-01-23 ENCOUNTER — APPOINTMENT (OUTPATIENT)
Dept: LAB | Facility: HOSPITAL | Age: 55
End: 2025-01-23
Payer: COMMERCIAL

## 2025-01-23 ENCOUNTER — TELEPHONE (OUTPATIENT)
Dept: ONCOLOGY | Facility: CLINIC | Age: 55
End: 2025-01-23
Payer: COMMERCIAL

## 2025-01-23 ENCOUNTER — CONSULT (OUTPATIENT)
Dept: ONCOLOGY | Facility: CLINIC | Age: 55
End: 2025-01-23
Payer: COMMERCIAL

## 2025-01-23 VITALS
SYSTOLIC BLOOD PRESSURE: 114 MMHG | HEIGHT: 73 IN | BODY MASS INDEX: 38.22 KG/M2 | TEMPERATURE: 98.2 F | DIASTOLIC BLOOD PRESSURE: 62 MMHG | OXYGEN SATURATION: 97 % | RESPIRATION RATE: 18 BRPM | WEIGHT: 288.4 LBS | HEART RATE: 84 BPM

## 2025-01-23 DIAGNOSIS — D69.6 THROMBOCYTOPENIA: ICD-10-CM

## 2025-01-23 DIAGNOSIS — D75.1 POLYCYTHEMIA: Primary | ICD-10-CM

## 2025-01-23 LAB
ALBUMIN SERPL-MCNC: 4.4 G/DL (ref 3.5–5.2)
ALBUMIN/GLOB SERPL: 1.5 G/DL
ALP SERPL-CCNC: 63 U/L (ref 39–117)
ALT SERPL W P-5'-P-CCNC: 19 U/L (ref 1–41)
ANION GAP SERPL CALCULATED.3IONS-SCNC: 9.4 MMOL/L (ref 5–15)
AST SERPL-CCNC: 29 U/L (ref 1–40)
BASOPHILS # BLD AUTO: 0.02 10*3/MM3 (ref 0–0.2)
BASOPHILS NFR BLD AUTO: 0.2 % (ref 0–1.5)
BILIRUB SERPL-MCNC: 1.2 MG/DL (ref 0–1.2)
BUN SERPL-MCNC: 16 MG/DL (ref 6–20)
BUN/CREAT SERPL: 17.4 (ref 7–25)
CALCIUM SPEC-SCNC: 9.9 MG/DL (ref 8.6–10.5)
CHLORIDE SERPL-SCNC: 101 MMOL/L (ref 98–107)
CO2 SERPL-SCNC: 28.6 MMOL/L (ref 22–29)
CREAT SERPL-MCNC: 0.92 MG/DL (ref 0.76–1.27)
DEPRECATED RDW RBC AUTO: 43.1 FL (ref 37–54)
EGFRCR SERPLBLD CKD-EPI 2021: 100.1 ML/MIN/1.73
EOSINOPHIL # BLD AUTO: 0.11 10*3/MM3 (ref 0–0.4)
EOSINOPHIL NFR BLD AUTO: 1 % (ref 0.3–6.2)
ERYTHROCYTE [DISTWIDTH] IN BLOOD BY AUTOMATED COUNT: 13 % (ref 12.3–15.4)
GLOBULIN UR ELPH-MCNC: 3 GM/DL
GLUCOSE SERPL-MCNC: 82 MG/DL (ref 65–99)
HCT VFR BLD AUTO: 42.6 % (ref 37.5–51)
HCV AB SER QL: NORMAL
HGB BLD-MCNC: 14.8 G/DL (ref 13–17.7)
HIV 1+2 AB+HIV1 P24 AG SERPL QL IA: NORMAL
LYMPHOCYTES # BLD AUTO: 1.83 10*3/MM3 (ref 0.7–3.1)
LYMPHOCYTES NFR BLD AUTO: 16.4 % (ref 19.6–45.3)
MCH RBC QN AUTO: 32.1 PG (ref 26.6–33)
MCHC RBC AUTO-ENTMCNC: 34.7 G/DL (ref 31.5–35.7)
MCV RBC AUTO: 92.4 FL (ref 79–97)
MONOCYTES # BLD AUTO: 1.39 10*3/MM3 (ref 0.1–0.9)
MONOCYTES NFR BLD AUTO: 12.4 % (ref 5–12)
NEUTROPHILS NFR BLD AUTO: 7.82 10*3/MM3 (ref 1.7–7)
NEUTROPHILS NFR BLD AUTO: 70 % (ref 42.7–76)
PLATELET # BLD AUTO: 77 10*3/MM3 (ref 140–450)
PMV BLD AUTO: 11.8 FL (ref 6–12)
POTASSIUM SERPL-SCNC: 4.3 MMOL/L (ref 3.5–5.2)
PROT SERPL-MCNC: 7.4 G/DL (ref 6–8.5)
RBC # BLD AUTO: 4.61 10*6/MM3 (ref 4.14–5.8)
SODIUM SERPL-SCNC: 139 MMOL/L (ref 136–145)
WBC NRBC COR # BLD AUTO: 11.17 10*3/MM3 (ref 3.4–10.8)

## 2025-01-23 PROCEDURE — 80053 COMPREHEN METABOLIC PANEL: CPT | Performed by: INTERNAL MEDICINE

## 2025-01-23 PROCEDURE — 99204 OFFICE O/P NEW MOD 45 MIN: CPT | Performed by: INTERNAL MEDICINE

## 2025-01-23 PROCEDURE — 85025 COMPLETE CBC W/AUTO DIFF WBC: CPT | Performed by: INTERNAL MEDICINE

## 2025-01-23 PROCEDURE — G0432 EIA HIV-1/HIV-2 SCREEN: HCPCS | Performed by: INTERNAL MEDICINE

## 2025-01-23 PROCEDURE — 86803 HEPATITIS C AB TEST: CPT | Performed by: INTERNAL MEDICINE

## 2025-01-23 PROCEDURE — 36415 COLL VENOUS BLD VENIPUNCTURE: CPT | Performed by: INTERNAL MEDICINE

## 2025-01-23 NOTE — TELEPHONE ENCOUNTER
"TRIED TO W/T NO ANSWER    Caller: Qasim Ch \"FLORY\"    Relationship: Self    Best call back number:   Telephone Information:   Mobile [phone]       What was the call regarding: HAS SORE THROAT AND LYMPH NODES ARE SWOLLEN, FEVER LAST NIGHT AT 8 PM,. SHOULD HE STILL COME IN TODAY FOR APPT?  "

## 2025-01-23 NOTE — TELEPHONE ENCOUNTER
After speaking with Dr. Ortiz, I contacted Mr. Ch. Patient stated he does not believe that he has ran a fever since last night however he has not taken his temperature this morning. He stated he checked his temperature a couple times since last night and he was not running a fever at that point. I informed him per Dr. Ortiz, he is agreeable with him keeping his appt for today however we do ask that he wear a mask. Patient confirmed. No further questions voiced at this time.

## 2025-01-24 LAB
B2 GLYCOPROT1 IGA SER-ACNC: <9 GPI IGA UNITS (ref 0–25)
B2 GLYCOPROT1 IGG SER-ACNC: <9 GPI IGG UNITS (ref 0–20)
B2 GLYCOPROT1 IGM SER-ACNC: <9 GPI IGM UNITS (ref 0–32)
CARDIOLIPIN IGG SER IA-ACNC: <9 GPL U/ML (ref 0–14)
CARDIOLIPIN IGM SER IA-ACNC: 23 MPL U/ML (ref 0–12)

## 2025-01-25 LAB
APTT HEX PL PPP: 12 SEC (ref 0–11)
APTT SCREEN TO CONFIRM RATIO: 0.91 RATIO (ref 0–1.34)
CONFIRM APTT/NORMAL: 43.6 SEC (ref 0–47.6)
LA 2 SCREEN W REFLEX-IMP: ABNORMAL
MIXING APTT: 45.8 SEC (ref 0–40.5)
MIXING DRVVT: 39 SEC (ref 0–40.4)
SCREEN APTT: 52.5 SEC (ref 0–43.5)
SCREEN DRVVT: 54.3 SEC (ref 0–47)
THROMBIN TIME: 15.2 SEC (ref 0–23)

## 2025-01-27 ENCOUNTER — HOSPITAL ENCOUNTER (OUTPATIENT)
Dept: CT IMAGING | Facility: HOSPITAL | Age: 55
Discharge: HOME | End: 2025-01-27
Payer: COMMERCIAL

## 2025-01-27 ENCOUNTER — TELEPHONE (OUTPATIENT)
Dept: CARDIOLOGY | Facility: CLINIC | Age: 55
End: 2025-01-27
Payer: COMMERCIAL

## 2025-01-27 VITALS
HEART RATE: 77 BPM | RESPIRATION RATE: 22 BRPM | OXYGEN SATURATION: 100 % | SYSTOLIC BLOOD PRESSURE: 124 MMHG | BODY MASS INDEX: 40.32 KG/M2 | HEIGHT: 71 IN | DIASTOLIC BLOOD PRESSURE: 77 MMHG | WEIGHT: 288 LBS

## 2025-01-27 DIAGNOSIS — R07.89 ATYPICAL CHEST PAIN: ICD-10-CM

## 2025-01-27 LAB
CREAT BLDA-MCNC: 0.8 MG/DL (ref 0.6–1.3)
EGFRCR SERPLBLD CKD-EPI 2021: 106.5 ML/MIN/1.73

## 2025-01-27 PROCEDURE — 75574 CT ANGIO HRT W/3D IMAGE: CPT

## 2025-01-27 PROCEDURE — 25510000001 IOPAMIDOL PER 1 ML: Performed by: INTERNAL MEDICINE

## 2025-01-27 PROCEDURE — 82565 ASSAY OF CREATININE: CPT

## 2025-01-27 PROCEDURE — 75574 CT ANGIO HRT W/3D IMAGE: CPT | Performed by: STUDENT IN AN ORGANIZED HEALTH CARE EDUCATION/TRAINING PROGRAM

## 2025-01-27 RX ORDER — METOPROLOL TARTRATE 50 MG
150 TABLET ORAL ONCE
Status: COMPLETED | OUTPATIENT
Start: 2025-01-27 | End: 2025-01-27

## 2025-01-27 RX ORDER — IVABRADINE 5 MG/1
10 TABLET, FILM COATED ORAL ONCE
Status: COMPLETED | OUTPATIENT
Start: 2025-01-27 | End: 2025-01-27

## 2025-01-27 RX ORDER — IOPAMIDOL 755 MG/ML
100 INJECTION, SOLUTION INTRAVASCULAR
Status: COMPLETED | OUTPATIENT
Start: 2025-01-27 | End: 2025-01-27

## 2025-01-27 RX ORDER — METOPROLOL TARTRATE 50 MG
200 TABLET ORAL ONCE
Status: COMPLETED | OUTPATIENT
Start: 2025-01-27 | End: 2025-01-27

## 2025-01-27 RX ORDER — NITROGLYCERIN 0.4 MG/1
0.8 TABLET SUBLINGUAL
Status: DISCONTINUED | OUTPATIENT
Start: 2025-01-27 | End: 2025-01-28 | Stop reason: HOSPADM

## 2025-01-27 RX ORDER — METOPROLOL TARTRATE 50 MG
50 TABLET ORAL
Status: DISCONTINUED | OUTPATIENT
Start: 2025-01-27 | End: 2025-01-28 | Stop reason: HOSPADM

## 2025-01-27 RX ORDER — METOPROLOL TARTRATE 25 MG/1
25 TABLET, FILM COATED ORAL ONCE
Status: COMPLETED | OUTPATIENT
Start: 2025-01-27 | End: 2025-01-27

## 2025-01-27 RX ORDER — METOPROLOL TARTRATE 50 MG
150 TABLET ORAL ONCE
Status: DISCONTINUED | OUTPATIENT
Start: 2025-01-27 | End: 2025-01-27

## 2025-01-27 RX ORDER — METOPROLOL TARTRATE 50 MG
100 TABLET ORAL ONCE
Status: COMPLETED | OUTPATIENT
Start: 2025-01-27 | End: 2025-01-27

## 2025-01-27 RX ORDER — METOPROLOL TARTRATE 1 MG/ML
5 INJECTION, SOLUTION INTRAVENOUS
Status: DISCONTINUED | OUTPATIENT
Start: 2025-01-27 | End: 2025-01-28 | Stop reason: HOSPADM

## 2025-01-27 RX ORDER — METOPROLOL TARTRATE 50 MG
50 TABLET ORAL ONCE
Status: COMPLETED | OUTPATIENT
Start: 2025-01-27 | End: 2025-01-27

## 2025-01-27 RX ORDER — NITROGLYCERIN 0.4 MG/1
0.4 TABLET SUBLINGUAL
Status: DISCONTINUED | OUTPATIENT
Start: 2025-01-27 | End: 2025-01-28 | Stop reason: HOSPADM

## 2025-01-27 RX ADMIN — METOPROLOL TARTRATE 50 MG: 50 TABLET, FILM COATED ORAL at 11:19

## 2025-01-27 RX ADMIN — METOPROLOL TARTRATE 100 MG: 50 TABLET, FILM COATED ORAL at 10:31

## 2025-01-27 RX ADMIN — METOPROLOL TARTRATE 50 MG: 50 TABLET, FILM COATED ORAL at 12:36

## 2025-01-27 RX ADMIN — METOPROLOL TARTRATE 50 MG: 50 TABLET, FILM COATED ORAL at 11:59

## 2025-01-27 RX ADMIN — IVABRADINE 10 MG: 5 TABLET, FILM COATED ORAL at 14:14

## 2025-01-27 RX ADMIN — IOPAMIDOL 100 ML: 755 INJECTION, SOLUTION INTRAVENOUS at 15:38

## 2025-01-27 NOTE — TELEPHONE ENCOUNTER
Ct calcium was 0 which was good but patient does heed to have his pcp check on his because it did show some nodules   hub can read

## 2025-01-27 NOTE — SIGNIFICANT NOTE
0958-patient arrived.  1010- pre-vitals taken, see flowsheets.  1021- IV start. 18G RAC  1023- ISTAT Cr level 0.8.  1031- 100 mg metoprolol given.  1119- 50 mg metoprolol given.  1159- 50 mg metoprolol given.   1236-50 mg metoprolol given.  1304- MD notified of patient HR still above above 70. MD ordered 10 mg ivbradine, given @ 1416.  1507- MD notified of patient HR after medication. MD ok to proceed to CT.  1522-CT began.  1529-SL Nitro given.  1544- post vitals  1545- IV Dc'd  1548- patient Dc'd.patient given instructions to hydrate after CT and not to resume metformin until Thursday 1/30. Patient acknowledged and understood instructions given.  1550- Dr. Urena Notified.

## 2025-01-27 NOTE — TELEPHONE ENCOUNTER
"Name: Qasim Ch \"FLORY\"      Relationship: Self      Best Callback Number: [phone]      HUB PROVIDED THE RELAY MESSAGE FROM THE OFFICE      PATIENT: HAS FURTHER QUESTIONS AND WOULD LIKE A CALL BACK AT THE FOLLOWING PHONE NUMBER     ADDITIONAL INFORMATION: PLEASE CALL BACK AFTER 1:00 TODAY, PT HAS QUESTIONS  "

## 2025-01-28 DIAGNOSIS — D69.6 THROMBOCYTOPENIA: Primary | ICD-10-CM

## 2025-01-28 NOTE — PROGRESS NOTES
Per Dr. Ortiz's office note from 1/23/25, patient to have a weekly blood count until follow up due to thrombocytopenia.

## 2025-01-29 ENCOUNTER — OFFICE VISIT (OUTPATIENT)
Dept: CARDIOLOGY | Facility: CLINIC | Age: 55
End: 2025-01-29
Payer: COMMERCIAL

## 2025-01-29 VITALS
SYSTOLIC BLOOD PRESSURE: 116 MMHG | OXYGEN SATURATION: 96 % | BODY MASS INDEX: 39.76 KG/M2 | WEIGHT: 284 LBS | DIASTOLIC BLOOD PRESSURE: 73 MMHG | RESPIRATION RATE: 18 BRPM | HEIGHT: 71 IN | HEART RATE: 90 BPM

## 2025-01-29 DIAGNOSIS — I65.23 ATHEROSCLEROSIS OF BOTH CAROTID ARTERIES: Primary | ICD-10-CM

## 2025-01-29 DIAGNOSIS — I49.3 PVC (PREMATURE VENTRICULAR CONTRACTION): ICD-10-CM

## 2025-01-29 DIAGNOSIS — I10 PRIMARY HYPERTENSION: Chronic | ICD-10-CM

## 2025-01-29 LAB
BASOPHILS # BLD AUTO: 0 X10E3/UL (ref 0–0.2)
BASOPHILS NFR BLD AUTO: 1 %
CV ZIO BASELINE AVG BPM: 75
CV ZIO BASELINE BPM HIGH: 207
CV ZIO BASELINE BPM LOW: 57
EOSINOPHIL # BLD AUTO: 0.1 X10E3/UL (ref 0–0.4)
EOSINOPHIL NFR BLD AUTO: 2 %
ERYTHROCYTE [DISTWIDTH] IN BLOOD BY AUTOMATED COUNT: 12.6 % (ref 11.6–15.4)
HCT VFR BLD AUTO: 43 % (ref 37.5–51)
HGB BLD-MCNC: 14.6 G/DL (ref 13–17.7)
IMM GRANULOCYTES # BLD AUTO: 0 X10E3/UL (ref 0–0.1)
IMM GRANULOCYTES NFR BLD AUTO: 0 %
LYMPHOCYTES # BLD AUTO: 2 X10E3/UL (ref 0.7–3.1)
LYMPHOCYTES NFR BLD AUTO: 36 %
MCH RBC QN AUTO: 31.5 PG (ref 26.6–33)
MCHC RBC AUTO-ENTMCNC: 34 G/DL (ref 31.5–35.7)
MCV RBC AUTO: 93 FL (ref 79–97)
MONOCYTES # BLD AUTO: 0.7 X10E3/UL (ref 0.1–0.9)
MONOCYTES NFR BLD AUTO: 12 %
NEUTROPHILS # BLD AUTO: 2.8 X10E3/UL (ref 1.4–7)
NEUTROPHILS NFR BLD AUTO: 49 %
PLATELET # BLD AUTO: 139 X10E3/UL (ref 150–450)
RBC # BLD AUTO: 4.63 X10E6/UL (ref 4.14–5.8)
WBC # BLD AUTO: 5.6 X10E3/UL (ref 3.4–10.8)

## 2025-01-29 PROCEDURE — 99214 OFFICE O/P EST MOD 30 MIN: CPT | Performed by: NURSE PRACTITIONER

## 2025-01-29 PROCEDURE — 93000 ELECTROCARDIOGRAM COMPLETE: CPT | Performed by: NURSE PRACTITIONER

## 2025-01-29 RX ORDER — DILTIAZEM HYDROCHLORIDE 120 MG/1
120 CAPSULE, COATED, EXTENDED RELEASE ORAL DAILY
Qty: 30 CAPSULE | Refills: 11 | Status: SHIPPED | OUTPATIENT
Start: 2025-01-29

## 2025-01-29 NOTE — PROGRESS NOTES
Cardiology Office Follow Up Visit      Primary Care Provider:  Narinder Ortiz MD    Reason for f/u:     Hospital follow-up  Chest pain  PVCs  Shortness of breath      Subjective     CC:    Denies chest pain, palpitations or shortness of breath at present    History of Present Illness       Qasim Ch is a 54 y.o. male.  Patient is a 54-year-old male who was recently evaluated at [facility] with complaints of chest pain, SOA    Patient had a stress echo in September 2024 which showed no evidence of ischemia or wall motion abnormalities.  EF was 55% with mild MR.    Past medical history includes obstructive sleep apnea, irritable bowel syndrome, GERD.    Patient has had previous history of palpitations and PVCs.    Patient was advised to have outpatient CT coronary angiogram and to follow-up with our office.  He was also placed on a cardiac monitor at discharge to assess PVC burden.    Patient had a CTA of his coronaries on January 27, 2025.  This test showed that his calcium score was 0.  Coronary arteries were normal with no evidence of any obstructive coronary artery disease.    Carotid Doppler was completed which showed mild plaquing but no significant obstructive disease.    Patient had a event monitor which showed very frequent PVCs with a burden of approximately 34%.  Frequent periods of ventricular bigeminy.  There was a brief episode of SVT and a 4 beat gracie of ventricular tachycardia.    Patient is diagnosed with sleep apnea and reports compliance with CPAP treatment.  He does drink a couple of sodas daily and denies significant alcohol consumption.    Is also undergoing evaluation by hematology for thrombocytopenia he has a splenic ultrasound pending.    Reviewing labs that were done in the hospital his anticardiolipin IgM was elevated, lupus anticoagulant PTT and dilute viper venom time was elevated.  He is having serial CBCs and has follow-up with hematology and a splenic ultrasound in  the next few weeks    TSH, magnesium, potassium have been in the normal range      ASSESSMENT/PLAN:      Diagnoses and all orders for this visit:    1. Atherosclerosis of both carotid arteries (Primary)  -     Lipid Panel; Future  -     dilTIAZem CD (CARDIZEM CD) 120 MG 24 hr capsule; Take 1 capsule by mouth Daily.  Dispense: 30 capsule; Refill: 11  -     Adult Transthoracic Echo Limited W/ Cont if Necessary Per Protocol; Future  -     Holter Monitor - 24 Hour; Future    2. PVC (premature ventricular contraction)  -     dilTIAZem CD (CARDIZEM CD) 120 MG 24 hr capsule; Take 1 capsule by mouth Daily.  Dispense: 30 capsule; Refill: 11  -     Adult Transthoracic Echo Limited W/ Cont if Necessary Per Protocol; Future            MEDICAL DECISION MAKING:    We reviewed all the patient's cardiac testing including his CT calcium score and CTA of his coronaries that was done yesterday.  His CT calcium score was 0 and there was no evidence of obstructive coronary disease on CT.    There was question of low EF on on the CTA of his coronaries suggesting his EF was 40% which is a decline from September 2024 where his ejection fraction was 55%.  There was question that this may have been impacted by motion.    Blood pressure today is stable.    EKG today shows sinus rhythm with a pattern of ventricular bigeminy.  The PVCs are unifocal in nature.    I have reviewed his case with Dr. Gonzalez.  We will add diltiazem CD1 20 mg daily.  I have asked him to log and check his blood pressure at home if blood pressure starts trending down we will reduce his lisinopril.    I would like to repeat an echo to reassess LV function.    Holter monitor will be repeated after 4 weeks to assess PVC burden.  If there still appears to be elevated PVC burden the patient will be referred to electrophysiology.    Lipid panel has been ordered to reassess cholesterol.    Additional recommendations pending review of above testing        Past Medical History:    Diagnosis Date    Bilateral leg cramps     AT     Colon polyps     FOLLOWED BY DR. SERA LE    DDD (degenerative disc disease), lumbar 2018    L3,L4,L5    Diabetes     TYPE 2, NIDDM    Disorder of gallbladder     Diverticulitis 09/2016    SIGMOID    Diverticulitis of colon 04/18/2014    Fatty liver     GERD (gastroesophageal reflux disease)     Hyperlipidemia     Hypertension     Hypertriglyceridemia     Hypogonadism 02/07/2013    Impotence of organic origin 02/22/2012    Irritable bowel syndrome 01/14/2013    Lumbar compression fracture     L4    Lumbar radiculopathy     Muscle spasm of left calf     Numbness and tingling of foot     LEFT FOOT, S/P BACK SURGERY    Obesity     WOLF (obstructive sleep apnea)     cpap    Plantar fasciitis 06/09/2016    Polycythemia 02/22/2012    Post laminectomy syndrome     Right sided abdominal pain 02/28/2021    SEEN AT [facility] ER    RUQ pain 05/02/2021    SEEN AT [facility] ER    Spinal stenosis of lumbar region without neurogenic claudication 09/05/2018    Splenomegaly 05/2021    Spondylolisthesis        Past Surgical History:   Procedure Laterality Date    ANAL FISSURECTOMY N/A 04/15/2009    DR.RAYMOND MICHAELS AT [facility]    CHOLECYSTECTOMY  2022    CHOLECYSTECTOMY WITH INTRAOPERATIVE CHOLANGIOGRAM N/A 06/23/2021    Procedure: CHOLECYSTECTOMY LAPAROSCOPIC INTRAOPERATIVE CHOLANGIOGRAM;  Surgeon: Chris Carrion Jr., MD;  Location: [facility] OR;  Service: General;  Laterality: N/A;    COLON SURGERY N/A 09/16/2016    SIGMOID COLECTOMY, D/T DIVERTICULITIS, DR. TYLER DUNBAR AT [facility]    COLONOSCOPY N/A 04/15/2009    MIXED ADENOMATOUS POLYP IN RECTUM, DR. HERLINDA MICHAELS AT [facility]    COLONOSCOPY N/A 01/28/2021    7 MM HYPERPLASTIC POLYP IN RECTUM, MULTIPLE SMALL AND LARGE DIVERTICULA IN ENTIRE COLON, RESCOPE IN 5 YRS, DR. SERA LE AT [facility]    COLONOSCOPY W/ POLYPECTOMY N/A 12/11/2013    2 TUBULOVILLOUS ADENOMA POLYPS IN CECUM, 8 MM TUBULOVILLOUS ADENOMA POLYP IN ASCENDING, 14 MM TUBULOVILLOUS  ADENOMA POLYP IN DISTAL ASCENDING, 5 MM TUBULAR ADENOMA POLYP IN ASCENDING, 5 MM BENIGN POLYP IN RECTUM, RESCOPE IN 2 YRS, DR. SERA LE AT [facility]    ENDOSCOPY AND COLONOSCOPY N/A 2014    HEMORRHOIDECTOMY N/A 04/15/2009    DR.RAYMOND MICHAELS AT [facility]    HERNIA REPAIR Left 09/14/2017    lap left inguinal hernia repair, ventral hernia repair, DR. TYLER DUNBAR AT [facility]    LUMBAR EPIDURAL INJECTION N/A     lumbar    LUMBAR SPINE POSTERIOR N/A 12/03/2018    TLIF L3-L5, DR. MOHAMMAD MAJD AT [facility]     TONSILLECTOMY Bilateral     UPPER GASTROINTESTINAL ENDOSCOPY      VENTRAL HERNIA REPAIR N/A 03/01/2018    LAPAROSCOPIC WITH PREVIOUS MESH REMOVAL, DR. TYLER DUNBAR AT [facility]         Current Outpatient Medications:     acetaminophen (TYLENOL) 500 MG tablet, Take 1 tablet by mouth Every 6 (Six) Hours As Needed., Disp: , Rfl:     albuterol sulfate  (90 Base) MCG/ACT inhaler, Inhale 2 puffs Every 6 (Six) Hours As Needed., Disp: , Rfl:     aspirin 81 MG EC tablet, Take 1 tablet by mouth Daily., Disp: 30 tablet, Rfl: 0    gabapentin (NEURONTIN) 300 MG capsule, Take 1 capsule by mouth 2 (Two) Times a Day., Disp: , Rfl:     lisinopril (PRINIVIL,ZESTRIL) 20 MG tablet, TAKE 1 TABLET BY MOUTH DAILY, Disp: 90 tablet, Rfl: 1    metFORMIN (GLUCOPHAGE) 1000 MG tablet, TAKE 1 TABLET BY MOUTH TWICE DAILY WITH MEALS, Disp: 180 tablet, Rfl: 1    multivitamin (THERAGRAN) tablet tablet, Take 1 tablet by mouth Every Night. HOLD FOR SURGERY, Disp: , Rfl:     nebivolol (BYSTOLIC) 5 MG tablet, TAKE 1 TABLET BY MOUTH DAILY, Disp: 90 tablet, Rfl: 1    pantoprazole (PROTONIX) 40 MG EC tablet, TAKE 1 TABLET BY MOUTH DAILY (Patient taking differently: Take 1 tablet by mouth As Needed.), Disp: 90 tablet, Rfl: 1    rosuvastatin (CRESTOR) 10 MG tablet, Take 1 tablet by mouth Every Night., Disp: 90 tablet, Rfl: 1    Semaglutide (Rybelsus) 14 MG tablet, TAKE 1 TABLET BY MOUTH EVERY MORNING BEFORE BREAKFAST, Disp: 30 tablet, Rfl: 2    dilTIAZem CD  "(CARDIZEM CD) 120 MG 24 hr capsule, Take 1 capsule by mouth Daily., Disp: 30 capsule, Rfl: 11    furosemide (Lasix) 20 MG tablet, Take 1 tablet by mouth Daily for 30 days. (Patient not taking: Reported on 2025), Disp: 30 tablet, Rfl: 0    isosorbide mononitrate (IMDUR) 30 MG 24 hr tablet, Take 1 tablet by mouth Daily for 30 days., Disp: 30 tablet, Rfl: 0    Social History     Socioeconomic History    Marital status:     Number of children: 2   Tobacco Use    Smoking status: Former     Current packs/day: 0.00     Average packs/day: 0.5 packs/day for 10.0 years (5.0 ttl pk-yrs)     Types: Cigarettes     Start date: 2002     Quit date: 2012     Years since quittin.0     Passive exposure: Past    Smokeless tobacco: Current     Types: Chew   Vaping Use    Vaping status: Never Used   Substance and Sexual Activity    Alcohol use: Not Currently    Drug use: Never    Sexual activity: Not Currently     Partners: Female       Family History   Problem Relation Age of Onset    Colon cancer Father 74    Malig Hyperthermia Neg Hx     Thyroid cancer Neg Hx     Liver disease Neg Hx        The following portions of the patient's history were reviewed and updated as appropriate: allergies, current medications, past family history, past medical history, past social history, past surgical history and problem list.    Review of Systems   All other systems reviewed and are negative.      Pertinent items are noted in HPI, all other systems reviewed and negative    /73 (BP Location: Right arm, Patient Position: Sitting, Cuff Size: Large Adult)   Pulse 90   Resp 18   Ht 180.3 cm (71\")   Wt 129 kg (284 lb)   SpO2 96%   BMI 39.61 kg/m² .  Objective     Vitals reviewed.   Constitutional:       General: Not in acute distress.     Appearance: Normal appearance. Well-developed.   Eyes:      Pupils: Pupils are equal, round, and reactive to light.   HENT:      Head: Normocephalic and atraumatic.   Neck:      " Vascular: No JVD.   Pulmonary:      Effort: Pulmonary effort is normal.      Breath sounds: Normal breath sounds.   Cardiovascular:      Normal rate. Regular rhythm.   Edema:     Peripheral edema absent.   Abdominal:      General: There is no distension.      Palpations: Abdomen is soft.      Tenderness: There is no abdominal tenderness.   Musculoskeletal: Normal range of motion.      Cervical back: Normal range of motion and neck supple. Skin:     General: Skin is warm and dry.   Neurological:      Mental Status: Alert and oriented to person, place, and time.             ECG 12 Lead    Date/Time: 1/29/2025 10:26 AM  Performed by: Blanca Celis APRN    Authorized by: Blanca Celis APRN  Comparison: compared with previous ECG   Similar to previous ECG  Rhythm: sinus rhythm  Ectopy: unifocal PVCs and bigeminy  Rate: normal  BPM: 90  Conduction: conduction normal  ST Segments: ST segments normal  T Waves: T waves normal  QRS axis: normal  Other: no other findings    Clinical impression: abnormal EKG          EKG ordered by and reviewed by me in office

## 2025-01-30 ENCOUNTER — APPOINTMENT (OUTPATIENT)
Dept: CARDIOLOGY | Facility: HOSPITAL | Age: 55
End: 2025-01-30
Payer: COMMERCIAL

## 2025-01-30 ENCOUNTER — HOSPITAL ENCOUNTER (OUTPATIENT)
Dept: ULTRASOUND IMAGING | Facility: HOSPITAL | Age: 55
Discharge: HOME | End: 2025-01-30
Admitting: INTERNAL MEDICINE
Payer: COMMERCIAL

## 2025-01-30 DIAGNOSIS — D69.6 THROMBOCYTOPENIA: ICD-10-CM

## 2025-01-30 PROCEDURE — 76705 ECHO EXAM OF ABDOMEN: CPT

## 2025-01-31 ENCOUNTER — TELEPHONE (OUTPATIENT)
Dept: CARDIOLOGY | Facility: CLINIC | Age: 55
End: 2025-01-31
Payer: COMMERCIAL

## 2025-01-31 DIAGNOSIS — R07.9 CHEST PAIN, UNSPECIFIED TYPE: Primary | ICD-10-CM

## 2025-01-31 NOTE — TELEPHONE ENCOUNTER
Spoke with the patient about scheduling him an echo in 1mo and a holter in 1mo patient stated he would call back he was at work.

## 2025-02-03 DIAGNOSIS — I49.3 PVC'S (PREMATURE VENTRICULAR CONTRACTIONS): Primary | ICD-10-CM

## 2025-02-04 DIAGNOSIS — D69.6 THROMBOCYTOPENIA: ICD-10-CM

## 2025-02-05 LAB
AMBIG ABBREV LP DEFAULT: NORMAL
BASOPHILS # BLD AUTO: 0 X10E3/UL (ref 0–0.2)
BASOPHILS NFR BLD AUTO: 0 %
CHOLEST SERPL-MCNC: 74 MG/DL (ref 100–199)
EOSINOPHIL # BLD AUTO: 0.1 X10E3/UL (ref 0–0.4)
EOSINOPHIL NFR BLD AUTO: 2 %
ERYTHROCYTE [DISTWIDTH] IN BLOOD BY AUTOMATED COUNT: 12.8 % (ref 11.6–15.4)
HCT VFR BLD AUTO: 45.2 % (ref 37.5–51)
HDLC SERPL-MCNC: 26 MG/DL
HGB BLD-MCNC: 15.2 G/DL (ref 13–17.7)
IMM GRANULOCYTES # BLD AUTO: 0 X10E3/UL (ref 0–0.1)
IMM GRANULOCYTES NFR BLD AUTO: 0 %
LDLC SERPL CALC-MCNC: 29 MG/DL (ref 0–99)
LYMPHOCYTES # BLD AUTO: 1.6 X10E3/UL (ref 0.7–3.1)
LYMPHOCYTES NFR BLD AUTO: 22 %
MCH RBC QN AUTO: 31.7 PG (ref 26.6–33)
MCHC RBC AUTO-ENTMCNC: 33.6 G/DL (ref 31.5–35.7)
MCV RBC AUTO: 94 FL (ref 79–97)
MONOCYTES # BLD AUTO: 0.9 X10E3/UL (ref 0.1–0.9)
MONOCYTES NFR BLD AUTO: 13 %
NEUTROPHILS # BLD AUTO: 4.6 X10E3/UL (ref 1.4–7)
NEUTROPHILS NFR BLD AUTO: 63 %
PLATELET # BLD AUTO: 156 X10E3/UL (ref 150–450)
RBC # BLD AUTO: 4.8 X10E6/UL (ref 4.14–5.8)
TRIGL SERPL-MCNC: 99 MG/DL (ref 0–149)
VLDLC SERPL CALC-MCNC: 19 MG/DL (ref 5–40)
WBC # BLD AUTO: 7.2 X10E3/UL (ref 3.4–10.8)

## 2025-02-05 NOTE — PROGRESS NOTES
Call pt  Significant decrease in cholesterol  Reviewed with dr. Gonzalez  Can reduce crestor to 1/2 tablet daily

## 2025-02-07 ENCOUNTER — TELEMEDICINE (OUTPATIENT)
Dept: INTERNAL MEDICINE | Age: 55
End: 2025-02-07
Payer: COMMERCIAL

## 2025-02-07 DIAGNOSIS — D69.6 THROMBOCYTOPENIA: ICD-10-CM

## 2025-02-07 DIAGNOSIS — E78.1 HYPERTRIGLYCERIDEMIA: Chronic | ICD-10-CM

## 2025-02-07 DIAGNOSIS — K74.00 FIBROSIS OF LIVER: Chronic | ICD-10-CM

## 2025-02-07 DIAGNOSIS — E78.1 PRIMARY HYPERTRIGLYCERIDEMIA: ICD-10-CM

## 2025-02-07 DIAGNOSIS — E11.9 TYPE 2 DIABETES MELLITUS WITHOUT COMPLICATION, WITHOUT LONG-TERM CURRENT USE OF INSULIN: Primary | Chronic | ICD-10-CM

## 2025-02-07 DIAGNOSIS — I10 PRIMARY HYPERTENSION: Chronic | ICD-10-CM

## 2025-02-07 PROBLEM — R07.9 CHEST PAIN: Status: RESOLVED | Noted: 2024-12-24 | Resolved: 2025-02-07

## 2025-02-07 PROCEDURE — 99214 OFFICE O/P EST MOD 30 MIN: CPT | Performed by: INTERNAL MEDICINE

## 2025-02-07 RX ORDER — ROSUVASTATIN CALCIUM 5 MG/1
5 TABLET, COATED ORAL NIGHTLY
Qty: 90 TABLET | Refills: 1 | Status: SHIPPED | OUTPATIENT
Start: 2025-02-07

## 2025-02-07 RX ORDER — NEBIVOLOL 5 MG/1
5 TABLET ORAL DAILY
COMMUNITY
Start: 2025-02-07

## 2025-02-07 NOTE — ASSESSMENT & PLAN NOTE
Lab Results   Component Value Date    LDL 29 02/04/2025    LDL 85 08/07/2024    LDL 45 12/14/2023    TRIG 99 02/04/2025    CHOLHDLRATIO 4.3 08/07/2024       He is now taking rosuvastatin 10 mg 1/2 tablet daily.  New prescription sent for 5 mg daily.

## 2025-02-07 NOTE — ASSESSMENT & PLAN NOTE
Blood pressure remains stable.    Continue lisinopril 20 mg, nebivolol 5 mg daily (previously added by APRN).  Also on diltiazem CD1 120 mg daily for PSVT.    Currently being worked up by cardiology for SVT.

## 2025-02-07 NOTE — PROGRESS NOTES
HEMATOLOGY ONCOLOGY OUTPATIENT FOLLOW UP       Patient name: Qasim Ch  : 1970  MRN: [id number]  Primary Care Physician: Narinder Ortiz MD  Referring Physician: Narinder Ortiz MD  Reason For Consult: Thrombocytopenia.    History of Present Illness:    2025: In the office for the first time to investigate  and treat thrombocytopenia.  He carried a long history of medical problems including hypertension, hypercholesterolemia and a suggestion of cirrhosis of the liver.  He also had undergone surgeries for back pain.  He was being treated for sleep apnea.  Several imaging studies had revealed architecture and morphology of the liver consistent with cirrhosis.  However sometime in  he was seen by a hepatologist and a liver biopsy was obtained.  The final report was of minimal steatosis with sinusoidal dilatation.  There was evidence of scattered nonspecific chronic inflammation but there was not excess iron.  There was periportal fibrosis with focal septa and architectural distortion.  Cirrhosis was not identified.  Nevertheless, in addition, the scans had revealed evidence of portal hypertension and splenomegaly.  No further investigations were undertaken after that.  On review of the records, at least since , intermittently, he had had thrombocytopenia.  However in the months preceding this visit, his platelet count has been dropping progressively.  At the time of this visit the platelet count was less than 80,000/mm³.  He had not had any bleeding, easy bruisability or petechiae.  He had been afebrile and without unintended weight loss.  He denied chest pains, cough or abdominal pain.  He had maintained regular bowel activity and denies dysuria or hematuria.  No melena or hematochezia.  No peripheral edema.  Family history reviewed and discussed.  On exam alert and conversant.  Oriented and in no distress.  No jaundice.  Does appear chronically ill.  Lungs clear.   Heart regular.  Abdomen protuberant and soft.  I believe I can palpate the edge of the liver approximately 3 cm below the costal margin.  I definitely could palpate the spleen approximately 5 cm below the costal margin.  Reviewed all the laboratory exams, all the imaging studies and reports of pathology and discussed with him.  I suspect immune thrombocytopenic purpura but he is splenomegaly most likely plays a role.  However the progressive decline in the platelets requires additional investigation.  Explained the plans.  To have a blood count checked every week until I see him again in approximately 3 weeks with laboratory exams.    2/11/2025: Feeling about the same as on the day of the first visit. He is active and without new limitations. He eats well and has not had any weight loss. Denies hair loss, oral ulceration of skin rash. He reported arthralgia of multiple articulations. He had no bleeding. On exam alert and conversant. No jaundice and no oral lesions. Respirations not labored. Lungs clear and heart regular. Abdomen protuberant and soft. No edema. Reviewed the laboratory exams. Viral serologies are negative. However the lupus anticoagulant and the anticardiolipin IgM is in the low medium positive. Anticardiolipin syndrome? Discussed with him at length. To see me in 3 months with new laboratory exams.     Past Medical History:   Diagnosis Date    Bilateral leg cramps     AT HS    Colon polyps     FOLLOWED BY DR. SERA LE    DDD (degenerative disc disease), lumbar 2018    L3,L4,L5    Diabetes     TYPE 2, NIDDM    Disorder of gallbladder     Diverticulitis 09/2016    SIGMOID    Diverticulitis of colon 04/18/2014    Fatty liver     GERD (gastroesophageal reflux disease)     Hyperlipidemia     Hypertension     Hypertriglyceridemia     Hypogonadism 02/07/2013    Impotence of organic origin 02/22/2012    Irritable bowel syndrome 01/14/2013    Lumbar compression fracture     L4    Lumbar radiculopathy      Muscle spasm of left calf     Numbness and tingling of foot     LEFT FOOT, S/P BACK SURGERY    Obesity     WOLF (obstructive sleep apnea)     cpap    Plantar fasciitis 06/09/2016    Polycythemia 02/22/2012    Post laminectomy syndrome     Right sided abdominal pain 02/28/2021    SEEN AT [facility] ER    RUQ pain 05/02/2021    SEEN AT [facility] ER    Spinal stenosis of lumbar region without neurogenic claudication 09/05/2018    Splenomegaly 05/2021    Spondylolisthesis      Past Surgical History:   Procedure Laterality Date    ANAL FISSURECTOMY N/A 04/15/2009    DR.RAYMOND MICHAELS AT [facility]    CHOLECYSTECTOMY  2022    CHOLECYSTECTOMY WITH INTRAOPERATIVE CHOLANGIOGRAM N/A 06/23/2021    Procedure: CHOLECYSTECTOMY LAPAROSCOPIC INTRAOPERATIVE CHOLANGIOGRAM;  Surgeon: Chris Carrion Jr., MD;  Location: [facility] OR;  Service: General;  Laterality: N/A;    COLON SURGERY N/A 09/16/2016    SIGMOID COLECTOMY, D/T DIVERTICULITIS, DR. TYLER DUNBAR AT [facility]    COLONOSCOPY N/A 04/15/2009    MIXED ADENOMATOUS POLYP IN RECTUM, DR. HERLINDA MICHAELS AT [facility]    COLONOSCOPY N/A 01/28/2021    7 MM HYPERPLASTIC POLYP IN RECTUM, MULTIPLE SMALL AND LARGE DIVERTICULA IN ENTIRE COLON, RESCOPE IN 5 YRS, DR. SERA LE AT [facility]    COLONOSCOPY W/ POLYPECTOMY N/A 12/11/2013    2 TUBULOVILLOUS ADENOMA POLYPS IN CECUM, 8 MM TUBULOVILLOUS ADENOMA POLYP IN ASCENDING, 14 MM TUBULOVILLOUS ADENOMA POLYP IN DISTAL ASCENDING, 5 MM TUBULAR ADENOMA POLYP IN ASCENDING, 5 MM BENIGN POLYP IN RECTUM, RESCOPE IN 2 YRS, DR. SERA LE AT [facility]    ENDOSCOPY AND COLONOSCOPY N/A 2014    HEMORRHOIDECTOMY N/A 04/15/2009    DR.RAYMOND MICHAELS AT [facility]    HERNIA REPAIR Left 09/14/2017    lap left inguinal hernia repair, ventral hernia repair, DR. TYLER DUNBAR AT [facility]    LUMBAR EPIDURAL INJECTION N/A     lumbar    LUMBAR SPINE POSTERIOR N/A 12/03/2018    TLIF L3-L5, DR. MOHAMMAD MAJD AT [facility]     TONSILLECTOMY Bilateral     UPPER GASTROINTESTINAL ENDOSCOPY      VENTRAL HERNIA REPAIR  N/A 03/01/2018    LAPAROSCOPIC WITH PREVIOUS MESH REMOVAL, DR. TYLER DUNBAR AT [facility]       Current Outpatient Medications:     acetaminophen (TYLENOL) 500 MG tablet, Take 1 tablet by mouth Every 6 (Six) Hours As Needed., Disp: , Rfl:     albuterol sulfate  (90 Base) MCG/ACT inhaler, Inhale 2 puffs Every 6 (Six) Hours As Needed., Disp: , Rfl:     dilTIAZem CD (CARDIZEM CD) 120 MG 24 hr capsule, Take 1 capsule by mouth Daily., Disp: 30 capsule, Rfl: 11    gabapentin (NEURONTIN) 300 MG capsule, Take 1 capsule by mouth 2 (Two) Times a Day., Disp: , Rfl:     lisinopril (PRINIVIL,ZESTRIL) 20 MG tablet, TAKE 1 TABLET BY MOUTH DAILY, Disp: 90 tablet, Rfl: 1    metFORMIN (GLUCOPHAGE) 1000 MG tablet, TAKE 1 TABLET BY MOUTH TWICE DAILY WITH MEALS, Disp: 180 tablet, Rfl: 1    multivitamin (THERAGRAN) tablet tablet, Take 1 tablet by mouth Every Night. HOLD FOR SURGERY, Disp: , Rfl:     nebivolol (BYSTOLIC) 5 MG tablet, Take 1 tablet by mouth Daily., Disp: , Rfl:     pantoprazole (PROTONIX) 40 MG EC tablet, TAKE 1 TABLET BY MOUTH DAILY (Patient taking differently: Take 1 tablet by mouth As Needed.), Disp: 90 tablet, Rfl: 1    rosuvastatin (CRESTOR) 5 MG tablet, Take 1 tablet by mouth Every Night., Disp: 90 tablet, Rfl: 1    Semaglutide (Rybelsus) 14 MG tablet, TAKE 1 TABLET BY MOUTH EVERY MORNING BEFORE BREAKFAST, Disp: 30 tablet, Rfl: 2    Allergies   Allergen Reactions    Penicillin G Anaphylaxis     Family History   Problem Relation Age of Onset    Colon cancer Father 74    Malig Hyperthermia Neg Hx     Thyroid cancer Neg Hx     Liver disease Neg Hx      Cancer-related family history includes Colon cancer (age of onset: 74) in his father. There is no history of Thyroid cancer.    Social History     Tobacco Use    Smoking status: Former     Current packs/day: 0.00     Average packs/day: 0.5 packs/day for 10.0 years (5.0 ttl pk-yrs)     Types: Cigarettes     Start date: 1/1/2002     Quit date: 1/1/2012     Years since  quittin.1     Passive exposure: Past    Smokeless tobacco: Current     Types: Chew   Vaping Use    Vaping status: Never Used   Substance Use Topics    Alcohol use: Not Currently    Drug use: Never     Social History     Social History Narrative     2nd marriage ()     ROS:   Review of Systems   Constitutional:  Negative for activity change, appetite change, chills, diaphoresis, fatigue, fever and unexpected weight change.   HENT:  Negative for congestion, dental problem, drooling, ear discharge, ear pain, facial swelling, hearing loss, mouth sores, nosebleeds, postnasal drip, rhinorrhea, sinus pressure, sinus pain, sneezing, sore throat, tinnitus, trouble swallowing and voice change.    Eyes:  Negative for photophobia, pain, discharge, redness, itching and visual disturbance.   Respiratory:  Negative for apnea, cough, choking, chest tightness, shortness of breath, wheezing and stridor.    Cardiovascular:  Negative for chest pain, palpitations and leg swelling.   Gastrointestinal:  Negative for abdominal distention, abdominal pain, anal bleeding, blood in stool, constipation, diarrhea, nausea, rectal pain and vomiting.   Endocrine: Negative for cold intolerance, heat intolerance, polydipsia and polyuria.   Genitourinary:  Negative for decreased urine volume, difficulty urinating, dysuria, flank pain, frequency, genital sores, hematuria and urgency.   Musculoskeletal:  Negative for arthralgias, back pain, gait problem, joint swelling, myalgias, neck pain and neck stiffness.   Skin:  Negative for color change, pallor and rash.   Neurological:  Negative for dizziness, tremors, seizures, syncope, facial asymmetry, speech difficulty, weakness, light-headedness, numbness and headaches.   Hematological:  Negative for adenopathy. Does not bruise/bleed easily.   Psychiatric/Behavioral:  Negative for agitation, behavioral problems, confusion, decreased concentration, hallucinations, self-injury, sleep  "disturbance and suicidal ideas. The patient is not nervous/anxious.      Objective:    Vital Signs:  Vitals:    02/11/25 1138   BP: 149/87  Comment: Retake: 155/70   Pulse: 58   Resp: 19   Temp: 98.4 °F (36.9 °C)   SpO2: 98%   Weight: 130 kg (287 lb)   Height: 180.3 cm (71\")   PainSc: 0-No pain     Body mass index is 40.03 kg/m².    ECOG  (1) Restricted in physically strenuous activity, ambulatory and able to do work of light nature    Physical Exam:   Physical Exam  Constitutional:       General: He is not in acute distress.     Appearance: He is ill-appearing. He is not toxic-appearing or diaphoretic.   HENT:      Head: Normocephalic and atraumatic.      Right Ear: External ear normal.      Left Ear: External ear normal.      Nose: Nose normal.      Mouth/Throat:      Mouth: Mucous membranes are moist.      Pharynx: Oropharynx is clear.   Eyes:      General: No scleral icterus.        Right eye: No discharge.         Left eye: No discharge.      Conjunctiva/sclera: Conjunctivae normal.      Pupils: Pupils are equal, round, and reactive to light.   Cardiovascular:      Rate and Rhythm: Normal rate and regular rhythm.      Pulses: Normal pulses.      Heart sounds: Normal heart sounds. No murmur heard.     No friction rub. No gallop.   Pulmonary:      Effort: No respiratory distress.      Breath sounds: No stridor. No wheezing, rhonchi or rales.   Chest:      Chest wall: No tenderness.   Abdominal:      General: Bowel sounds are normal. There is no distension.      Palpations: Abdomen is soft. There is no mass.      Tenderness: There is no abdominal tenderness. There is no right CVA tenderness, left CVA tenderness, guarding or rebound.      Comments: Protuberant, soft.  The liver can be palpated approximately 3 cm below the costal margin.  The spleen seems palpable approximately 5 cm below the costal margin.   Musculoskeletal:         General: No swelling, tenderness, deformity or signs of injury.      Cervical back: " No rigidity.      Right lower leg: No edema.      Left lower leg: No edema.   Lymphadenopathy:      Cervical: No cervical adenopathy.   Skin:     General: Skin is warm and dry.      Coloration: Skin is not jaundiced or pale.      Findings: No bruising or rash.   Neurological:      General: No focal deficit present.      Mental Status: He is alert and oriented to person, place, and time.      Cranial Nerves: No cranial nerve deficit.   Psychiatric:         Mood and Affect: Mood normal.         Behavior: Behavior normal.         Thought Content: Thought content normal.         Judgment: Judgment normal.     FELICITAS Ortiz MD performed the physical exam on 2/11/2025 as documented above.     Lab Results - Last 18 Months   Lab Units 02/04/25  1041 01/28/25  1126 01/23/25  1144   WBC x10E3/uL 7.2 5.6 11.17*   HEMOGLOBIN g/dL 15.2 14.6 14.8   HEMATOCRIT % 45.2 43.0 42.6   PLATELETS x10E3/uL 156 139* 77*   MCV fL 94 93 92.4     Lab Results - Last 18 Months   Lab Units 01/27/25  1023 01/23/25  1238 12/25/24  0357 12/24/24  1527 12/24/24  1527 09/03/24  1552   SODIUM mmol/L  --  139 138  --  139 141   POTASSIUM mmol/L  --  4.3 4.0  --  4.4 4.1   CHLORIDE mmol/L  --  101 106  --  106 101   CO2 mmol/L  --  28.6 24.6  --  24.5 25.8   BUN mg/dL  --  16 17  --  18 15   CREATININE mg/dL 0.80 0.92 0.78   < > 0.87 0.84   CALCIUM mg/dL  --  9.9 8.9  --  9.6 9.7   BILIRUBIN mg/dL  --  1.2  --   --  0.6 0.6   ALK PHOS U/L  --  63  --   --  49 61   ALT (SGPT) U/L  --  19  --   --  20 24   AST (SGOT) U/L  --  29  --   --  29 24   GLUCOSE mg/dL  --  82 82  --  81 70    < > = values in this interval not displayed.     Lab Results   Component Value Date    GLUCOSE 82 01/23/2025    BUN 16 01/23/2025    CREATININE 0.80 01/27/2025    EGFRIFNONA 101 08/31/2021    EGFRIFAFRI >150 05/04/2021    BCR 17.4 01/23/2025    K 4.3 01/23/2025    CO2 28.6 01/23/2025    CALCIUM 9.9 01/23/2025    PROTENTOTREF 7.2 09/03/2024    ALBUMIN 4.4 01/23/2025     LABIL2 1.8 09/03/2024    AST 29 01/23/2025    ALT 19 01/23/2025     Lab Results - Last 18 Months   Lab Units 12/24/24  1527   INR  1.11*   APTT seconds 35.5*     Lab Results   Component Value Date    IRON 128 05/02/2023    FERRITIN 138 05/02/2023     LDH   Date Value Ref Range Status   05/04/2021 144 135 - 225 U/L Final     Uric Acid   Date Value Ref Range Status   05/04/2021 7.0 3.4 - 7.0 mg/dL Final     Lab Results   Component Value Date    SEDRATE 46 (H) 05/04/2021     Lab Results   Component Value Date    PTT 35.5 (H) 12/24/2024    INR 1.11 (H) 12/24/2024     Lab Results   Component Value Date    PSA 0.356 12/14/2023     Lab Results   Component Value Date    SEDRATE 46 (H) 05/04/2021      Assessment & Plan     1.  Antiphospholipid syndrome?: Very well could be the explanation for the thrombocytopenia. Will confirm with additional testing in 12 weeks.   2.  Chronic liver disease with portal hypertension and splenomegaly: The ultrasound confirms splenomegaly. I believe he has cirrhosis and I have referred him to hepatology.   3.  Class II obesity with a body mass index of 38 kg/m².  4.  Reviewed all the laboratory exams including blood counts, chemistry, viral serologies and immunologic tests.   6.  He's to see me in approximately 12 weeks with new results.     Yemi Ortiz MD on 2/11/2025 at 13:05

## 2025-02-07 NOTE — ASSESSMENT & PLAN NOTE
Lab Results   Component Value Date    HGBA1C 5.2 08/07/2024    HGBA1C 5.40 12/14/2023    HGBA1C 5.3 05/02/2023    CREATININE 0.80 01/27/2025    LDL 29 02/04/2025    MALBCRERATIO 7 08/07/2024      Consider switching to Ozempic or Mounjaro for additional weight loss benefit. Discussed.     Check A1c with next lab for hematology. Order placed in system.

## 2025-02-07 NOTE — ASSESSMENT & PLAN NOTE
Again reviewed previous liver workup including with consultation with Dr. Gallagher, liver biopsy results and imaging results.     Continue aggressive modification of metabolic problems including obesity, diabetes and hypertriglyceridemia.  Continue to abstain from alcohol.  Agree with decreasing rosuvastatin to 5 mg.    Advised him to contact his GI provider's office and schedule a follow-up.

## 2025-02-07 NOTE — PROGRESS NOTES
J  U  N  O  H    K  I  M ,   M  D      I  N  T  E  R  N  A  L    M  E  D  I  C  I  N  E         ENCOUNTER DATE:  02/07/2025    Qasim Quirozerd / 54 y.o. / male    TELEHEALTH VISIT ENCOUNTER     You have chosen to receive care through a telehealth visit  Mode of Visit: Video with audio (visit included audio and video interaction)  No technical issues occurred during this visit  The patient has signed the video visit consent form.  Location of patient: -HOME-  Location of provider: +HOME+    CHIEF COMPLAINT / REASON FOR OFFICE VISIT     Diabetes, Chronic liver disease, Heart problem, and Low platelets      ASSESSMENT & PLAN     Problem List Items Addressed This Visit          High    Diabetes mellitus, type II - Primary (Chronic)    Overview     Continue  Rybelsus 14 mg qd   Metformin 1000 mg BID          Current Assessment & Plan     Lab Results   Component Value Date    HGBA1C 5.2 08/07/2024    HGBA1C 5.40 12/14/2023    HGBA1C 5.3 05/02/2023    CREATININE 0.80 01/27/2025    LDL 29 02/04/2025    MALBCRERATIO 7 08/07/2024      Consider switching to Ozempic or Mounjaro for additional weight loss benefit. Discussed.     Check A1c with next lab for hematology. Order placed in system.         Relevant Medications    metFORMIN (GLUCOPHAGE) 1000 MG tablet    lisinopril (PRINIVIL,ZESTRIL) 20 MG tablet    Semaglutide (Rybelsus) 14 MG tablet    Other Relevant Orders    Hemoglobin A1c       Medium    Hypertension (Chronic)    Current Assessment & Plan     Blood pressure remains stable.    Continue lisinopril 20 mg, nebivolol 5 mg daily (previously added by APRN).  Also on diltiazem CD1 120 mg daily for PSVT.    Currently being worked up by cardiology for SVT.         Relevant Medications    lisinopril (PRINIVIL,ZESTRIL) 20 MG tablet    dilTIAZem CD (CARDIZEM CD) 120 MG 24 hr capsule    nebivolol (BYSTOLIC) 5 MG tablet    Hypertriglyceridemia (Chronic)    Current Assessment & Plan     Lab Results   Component Value  Date    LDL 29 02/04/2025    LDL 85 08/07/2024    LDL 45 12/14/2023    TRIG 99 02/04/2025    CHOLHDLRATIO 4.3 08/07/2024       He is now taking rosuvastatin 10 mg 1/2 tablet daily.  New prescription sent for 5 mg daily.         Relevant Medications    rosuvastatin (CRESTOR) 5 MG tablet    Fibrosis of liver (Chronic)    Overview     7/2023: s/p liver biopsy (no cirrhosis), + steatosis with mild inflammation/fibrosis         Current Assessment & Plan     Again reviewed previous liver workup including with consultation with Dr. Gallagher, liver biopsy results and imaging results.     Continue aggressive modification of metabolic problems including obesity, diabetes and hypertriglyceridemia.  Continue to abstain from alcohol.  Agree with decreasing rosuvastatin to 5 mg.    Advised him to contact his GI provider's office and schedule a follow-up.            Low    Thrombocytopenia (Chronic)    Current Assessment & Plan     Probably due to ITP and possibly liver disease. Continue close follow-up with hematology.           Other Visit Diagnoses       Primary hypertriglyceridemia        Relevant Medications    rosuvastatin (CRESTOR) 5 MG tablet          Orders Placed This Encounter   Procedures    Hemoglobin A1c     Standing Status:   Future     Standing Expiration Date:   2/7/2026     Order Specific Question:   Release to patient     Answer:   Routine Release [[id number]]     New Medications Ordered This Visit   Medications    rosuvastatin (CRESTOR) 5 MG tablet     Sig: Take 1 tablet by mouth Every Night.     Dispense:  90 tablet     Refill:  1       SUMMARY/DISCUSSION        TOTAL TIME OF ENCOUNTER:  25 MINUTES      Next Appointment with me: Visit date not found    Return in about 4 months (around 6/7/2025) for Reassess chronic medical problems.      VITAL SIGNS     There were no vitals filed for this visit.    BP Readings from Last 3 Encounters:   01/29/25 116/73   01/27/25 124/77   01/23/25 124/72     Wt Readings from Last  3 Encounters:   01/29/25 129 kg (284 lb)   01/27/25 131 kg (288 lb)   01/23/25 131 kg (288 lb)     There is no height or weight on file to calculate BMI.    Blood pressure readings recorded on patient flowsheet:       No data to display                  MEDICATIONS AT THE TIME OF OFFICE VISIT     Current Outpatient Medications on File Prior to Visit   Medication Sig    acetaminophen (TYLENOL) 500 MG tablet Take 1 tablet by mouth Every 6 (Six) Hours As Needed.    albuterol sulfate  (90 Base) MCG/ACT inhaler Inhale 2 puffs Every 6 (Six) Hours As Needed.    dilTIAZem CD (CARDIZEM CD) 120 MG 24 hr capsule Take 1 capsule by mouth Daily.    gabapentin (NEURONTIN) 300 MG capsule Take 1 capsule by mouth 2 (Two) Times a Day.    lisinopril (PRINIVIL,ZESTRIL) 20 MG tablet TAKE 1 TABLET BY MOUTH DAILY    metFORMIN (GLUCOPHAGE) 1000 MG tablet TAKE 1 TABLET BY MOUTH TWICE DAILY WITH MEALS    multivitamin (THERAGRAN) tablet tablet Take 1 tablet by mouth Every Night. HOLD FOR SURGERY    nebivolol (BYSTOLIC) 5 MG tablet Take 1 tablet by mouth Daily.    pantoprazole (PROTONIX) 40 MG EC tablet TAKE 1 TABLET BY MOUTH DAILY (Patient taking differently: Take 1 tablet by mouth As Needed.)    Semaglutide (Rybelsus) 14 MG tablet TAKE 1 TABLET BY MOUTH EVERY MORNING BEFORE BREAKFAST    [DISCONTINUED] aspirin 81 MG EC tablet Take 1 tablet by mouth Daily.    [DISCONTINUED] isosorbide mononitrate (IMDUR) 30 MG 24 hr tablet Take 1 tablet by mouth Daily for 30 days.    [DISCONTINUED] nebivolol (BYSTOLIC) 5 MG tablet TAKE 1 TABLET BY MOUTH DAILY    [DISCONTINUED] rosuvastatin (CRESTOR) 10 MG tablet Take 1 tablet by mouth Every Night.     No current facility-administered medications on file prior to visit.          HISTORY OF PRESENT ILLNESS     Patient states that he has been seeing a hematologist for chronic low platelets which is thought to be most likely due to ITP but he also has evidence of chronic liver disease.  Liver biopsy in 2023  showed mild inflammation with periportal fibrosis without definite evidence of cirrhosis.  He was seen by Dr. Gallagher in 2023.  He has history of type 2 diabetes which is being managed with Rybelsus 14 mg and metformin 1000 mg twice daily with excellent A1c level.  Hypertension also remains well-controlled on lisinopril 20 mg.  APRN previously added nebivolol 5 mg for hypertension.  Currently he is also taking diltiazem CD1 120 mg through his cardiology team for SVT that was noted on event monitoring.  He is currently undergoing 48-hour Holter monitor for further assessment.  His cardiologist recommended decreasing rosuvastatin to 10 mg 1/2 tablet daily.  He denies any chest pains, significant heart palpitations but does experience dyspnea.  He denies any melena or blood in the stool.  Denies any abdominal pain.    Patient Care Team:  Narinder Ortiz MD as PCP - General (Internal Medicine)  Marshall Moreno MD as Consulting Physician (Ophthalmology)    REVIEW OF SYSTEMS     Review of Systems       PHYSICAL EXAMINATION     Physical Exam  Alert with normal thought and judgment.        REVIEWED DATA     Labs:     Lab Results   Component Value Date     01/23/2025    K 4.3 01/23/2025    CALCIUM 9.9 01/23/2025    AST 29 01/23/2025    ALT 19 01/23/2025    BUN 16 01/23/2025    CREATININE 0.80 01/27/2025    CREATININE 0.92 01/23/2025    CREATININE 0.78 12/25/2024    EGFRRESULT 104.9 09/03/2024     Lab Results   Component Value Date    HGBA1C 5.2 08/07/2024    HGBA1C 5.40 12/14/2023    HGBA1C 5.3 05/02/2023     Lab Results   Component Value Date    LDL 29 02/04/2025    LDL 85 08/07/2024    LDL 45 12/14/2023    HDL 26 (L) 02/04/2025    HDL 37 (L) 08/07/2024    TRIG 99 02/04/2025    TRIG 214 (H) 08/07/2024     Lab Results   Component Value Date    TSH 0.365 09/03/2024    TSH 0.566 08/07/2024    TSH 0.429 12/27/2019    FREET4 1.23 09/03/2024    FREET4 1.24 08/07/2024     Lab Results   Component Value Date    WBC 7.2  02/04/2025    HGB 15.2 02/04/2025     02/04/2025     Lab Results   Component Value Date     02/04/2025     (L) 01/28/2025    PLT 77 (L) 01/23/2025      Lab Results   Component Value Date    EMANUEL Ray 08/07/2024       Tissue Pathology Exam (07/14/2023 10:34)   Final Diagnosis   1. Liver, CT-Guided Core Biopsy:               A. Glycogenated nuclei, minimal steatosis (2% macrovesicular, 3% microvesicular) and sinusoidal dilatation.               B. Scattered mild, nonspecific chronic inflammation (see comment).               C. Negative for increased iron by iron special stain.               D. Trichrome stain highlights periportal fibrosis with focal septa and architectural distortion (stage 3).     swm/jse    Electronically signed by Kerry Chang MD on 7/17/2023 at 1323   Comment    The overall findings are nonspecific.  The sinusoidal dilatation may be associated with an aspect of myocardial hepatopathy.  The chronic inflammation consists of lymphocytes, occasional plasma cells and eosinophils; therefore, additional serology to rule out a drug reaction versus a viral or autoimmune hepatitis may be warranted.  No evidence of well-defined cirrhosis is appreciated.  This case is reviewed internally with Jeffery Mcbride and Tre, who concur.     Imaging:     US Spleen (01/30/2025 13:22)   Findings:  Spleen is enlarged, 15.0 x 13.8 x 6.1 cm. The echotexture is homogeneous. No focal splenic lesion is seen. Color flow is documented within the spleen. No ascites is evident.     IMPRESSION:  Impression:      CT Abdomen Pelvis Without Contrast (04/14/2023 09:40)   1. No acute process demonstrated  2. Lobular liver contour and splenorenal varices suggests cirrhosis with portal hypertension. No ascites or splenomegaly  3. Colonic diverticulosis  4. Stable mildly enlarged upper abdominal lymph nodes are likely reactive    Medical Tests:               Summary of old records / correspondence /  consultant report:     Office Visit with Blanca Celis APRN (01/29/2025)   Office Visit with Jase Gallagher MD (06/29/2023)   Consult with Yemi Ortiz MD (01/23/2025)     Request outside records:

## 2025-02-11 ENCOUNTER — DOCUMENTATION (OUTPATIENT)
Dept: PHYSICAL THERAPY | Facility: CLINIC | Age: 55
End: 2025-02-11
Payer: COMMERCIAL

## 2025-02-11 ENCOUNTER — OFFICE VISIT (OUTPATIENT)
Dept: ONCOLOGY | Facility: CLINIC | Age: 55
End: 2025-02-11
Payer: COMMERCIAL

## 2025-02-11 VITALS
WEIGHT: 287 LBS | OXYGEN SATURATION: 98 % | BODY MASS INDEX: 40.18 KG/M2 | HEART RATE: 58 BPM | DIASTOLIC BLOOD PRESSURE: 87 MMHG | TEMPERATURE: 98.4 F | SYSTOLIC BLOOD PRESSURE: 149 MMHG | RESPIRATION RATE: 19 BRPM | HEIGHT: 71 IN

## 2025-02-11 DIAGNOSIS — D69.6 THROMBOCYTOPENIA: Primary | ICD-10-CM

## 2025-02-11 DIAGNOSIS — D75.1 POLYCYTHEMIA: ICD-10-CM

## 2025-02-11 NOTE — PROGRESS NOTES
Discharge Summary  Discharge Summary from Physical Therapy Report      Dates  PT visit: 11/4/24  to 11/13/24  Number of Visits: 2         Goals: Not Met    Discharge Plan: Continue with current home exercise program as instructed    Comments Pt only attended the initial eval and one follow up visit. Pt reported some improvement with stretches during initial visit. Pt cancel last 2 visits and did not reschedule. Pt is self discharging.           Thomas Luke, PT  Physical Therapist

## 2025-02-12 DIAGNOSIS — E78.1 PRIMARY HYPERTRIGLYCERIDEMIA: ICD-10-CM

## 2025-02-12 LAB
BASOPHILS # BLD AUTO: 0.1 X10E3/UL (ref 0–0.2)
BASOPHILS NFR BLD AUTO: 1 %
EOSINOPHIL # BLD AUTO: 0.1 X10E3/UL (ref 0–0.4)
EOSINOPHIL NFR BLD AUTO: 1 %
ERYTHROCYTE [DISTWIDTH] IN BLOOD BY AUTOMATED COUNT: 12.8 % (ref 11.6–15.4)
HCT VFR BLD AUTO: 43.4 % (ref 37.5–51)
HGB BLD-MCNC: 14.7 G/DL (ref 13–17.7)
IMM GRANULOCYTES # BLD AUTO: 0 X10E3/UL (ref 0–0.1)
IMM GRANULOCYTES NFR BLD AUTO: 0 %
LYMPHOCYTES # BLD AUTO: 1.8 X10E3/UL (ref 0.7–3.1)
LYMPHOCYTES NFR BLD AUTO: 22 %
MCH RBC QN AUTO: 31.1 PG (ref 26.6–33)
MCHC RBC AUTO-ENTMCNC: 33.9 G/DL (ref 31.5–35.7)
MCV RBC AUTO: 92 FL (ref 79–97)
MONOCYTES # BLD AUTO: 0.7 X10E3/UL (ref 0.1–0.9)
MONOCYTES NFR BLD AUTO: 8 %
NEUTROPHILS # BLD AUTO: 5.7 X10E3/UL (ref 1.4–7)
NEUTROPHILS NFR BLD AUTO: 68 %
PLATELET # BLD AUTO: 139 X10E3/UL (ref 150–450)
RBC # BLD AUTO: 4.72 X10E6/UL (ref 4.14–5.8)
WBC # BLD AUTO: 8.3 X10E3/UL (ref 3.4–10.8)

## 2025-02-12 RX ORDER — ROSUVASTATIN CALCIUM 10 MG/1
10 TABLET, COATED ORAL NIGHTLY
Qty: 90 TABLET | Refills: 1 | OUTPATIENT
Start: 2025-02-12

## 2025-02-22 LAB
CV ZIO BASELINE AVG BPM: 75
CV ZIO BASELINE BPM HIGH: 207
CV ZIO BASELINE BPM LOW: 57

## 2025-03-04 ENCOUNTER — HOSPITAL ENCOUNTER (OUTPATIENT)
Dept: CARDIOLOGY | Facility: HOSPITAL | Age: 55
Discharge: HOME | End: 2025-03-04
Payer: COMMERCIAL

## 2025-03-04 VITALS
HEART RATE: 78 BPM | SYSTOLIC BLOOD PRESSURE: 135 MMHG | DIASTOLIC BLOOD PRESSURE: 75 MMHG | BODY MASS INDEX: 40.12 KG/M2 | WEIGHT: 286.6 LBS | HEIGHT: 71 IN

## 2025-03-04 DIAGNOSIS — R07.9 CHEST PAIN, UNSPECIFIED TYPE: ICD-10-CM

## 2025-03-04 DIAGNOSIS — I49.3 PVC'S (PREMATURE VENTRICULAR CONTRACTIONS): ICD-10-CM

## 2025-03-04 PROCEDURE — 93242 EXT ECG>48HR<7D RECORDING: CPT

## 2025-03-04 PROCEDURE — 93306 TTE W/DOPPLER COMPLETE: CPT

## 2025-03-09 LAB
AV MEAN PRESS GRAD SYS DOP V1V2: 5.9 MMHG
AV VMAX SYS DOP: 178.9 CM/SEC
BH CV ECHO MEAS - ACS: 2.49 CM
BH CV ECHO MEAS - AI P1/2T: 750.3 MSEC
BH CV ECHO MEAS - AO MAX PG: 12.8 MMHG
BH CV ECHO MEAS - AO ROOT DIAM: 3.3 CM
BH CV ECHO MEAS - AO V2 VTI: 31.9 CM
BH CV ECHO MEAS - AVA(I,D): 3.2 CM2
BH CV ECHO MEAS - EDV(CUBED): 250 ML
BH CV ECHO MEAS - EDV(MOD-SP4): 158 ML
BH CV ECHO MEAS - EF(MOD-SP4): 66.4 %
BH CV ECHO MEAS - ESV(CUBED): 84.7 ML
BH CV ECHO MEAS - ESV(MOD-SP4): 53.2 ML
BH CV ECHO MEAS - FS: 30.3 %
BH CV ECHO MEAS - IVS/LVPW: 1.01 CM
BH CV ECHO MEAS - IVSD: 1.41 CM
BH CV ECHO MEAS - LA DIMENSION: 5.9 CM
BH CV ECHO MEAS - LAT PEAK E' VEL: 10.5 CM/SEC
BH CV ECHO MEAS - LV DIASTOLIC VOL/BSA (35-75): 64.3 CM2
BH CV ECHO MEAS - LV MASS(C)D: 420.4 GRAMS
BH CV ECHO MEAS - LV MAX PG: 5.6 MMHG
BH CV ECHO MEAS - LV MEAN PG: 3 MMHG
BH CV ECHO MEAS - LV SYSTOLIC VOL/BSA (12-30): 21.6 CM2
BH CV ECHO MEAS - LV V1 MAX: 118.7 CM/SEC
BH CV ECHO MEAS - LV V1 VTI: 23.2 CM
BH CV ECHO MEAS - LVIDD: 6.3 CM
BH CV ECHO MEAS - LVIDS: 4.4 CM
BH CV ECHO MEAS - LVOT AREA: 4.3 CM2
BH CV ECHO MEAS - LVOT DIAM: 2.35 CM
BH CV ECHO MEAS - LVPWD: 1.4 CM
BH CV ECHO MEAS - MED PEAK E' VEL: 6.7 CM/SEC
BH CV ECHO MEAS - MR MAX PG: 132.9 MMHG
BH CV ECHO MEAS - MR MAX VEL: 575.7 CM/SEC
BH CV ECHO MEAS - MV A MAX VEL: 59.1 CM/SEC
BH CV ECHO MEAS - MV DEC SLOPE: 430.3 CM/SEC2
BH CV ECHO MEAS - MV DEC TIME: 0.25 SEC
BH CV ECHO MEAS - MV E MAX VEL: 106.2 CM/SEC
BH CV ECHO MEAS - MV E/A: 1.8
BH CV ECHO MEAS - MV MAX PG: 7.1 MMHG
BH CV ECHO MEAS - MV MEAN PG: 2.48 MMHG
BH CV ECHO MEAS - MV V2 VTI: 35.9 CM
BH CV ECHO MEAS - MVA(VTI): 2.8 CM2
BH CV ECHO MEAS - PA ACC TIME: 0.09 SEC
BH CV ECHO MEAS - PA V2 MAX: 140.6 CM/SEC
BH CV ECHO MEAS - PI END-D VEL: 103.6 CM/SEC
BH CV ECHO MEAS - PULM A REVS DUR: 0.1 SEC
BH CV ECHO MEAS - PULM A REVS VEL: 26.2 CM/SEC
BH CV ECHO MEAS - PULM DIAS VEL: 83 CM/SEC
BH CV ECHO MEAS - PULM S/D: 0.96
BH CV ECHO MEAS - PULM SYS VEL: 79.4 CM/SEC
BH CV ECHO MEAS - RAP SYSTOLE: 3 MMHG
BH CV ECHO MEAS - RVSP: 39.2 MMHG
BH CV ECHO MEAS - SV(LVOT): 100.5 ML
BH CV ECHO MEAS - SV(MOD-SP4): 104.9 ML
BH CV ECHO MEAS - SVI(LVOT): 40.9 ML/M2
BH CV ECHO MEAS - SVI(MOD-SP4): 42.7 ML/M2
BH CV ECHO MEAS - TAPSE (>1.6): 3.2 CM
BH CV ECHO MEAS - TR MAX PG: 36.2 MMHG
BH CV ECHO MEAS - TR MAX VEL: 300.6 CM/SEC
BH CV ECHO MEASUREMENTS AVERAGE E/E' RATIO: 12.35
BH CV XLRA - RV BASE: 4.2 CM
BH CV XLRA - RV LENGTH: 4.2 CM
BH CV XLRA - RV MID: 2.17 CM
LV EF BIPLANE MOD: 60 %

## 2025-03-13 LAB
CV ZIO BASELINE AVG BPM: 81 BPM
CV ZIO BASELINE BPM HIGH: 152 BPM
CV ZIO BASELINE BPM LOW: 58 BPM
CV ZIO DEVICE ANALYSIS TIME: NORMAL
CV ZIO ECT SVE COUNT: 123 EPISODES
CV ZIO ECT SVE CPLT COUNT: 11 EPISODES
CV ZIO ECT SVE CPLT FREQ: NORMAL
CV ZIO ECT SVE FREQ: NORMAL
CV ZIO ECT SVE TPLT COUNT: 1 EPISODES
CV ZIO ECT SVE TPLT FREQ: NORMAL
CV ZIO ECT VE COUNT: NORMAL EPISODES
CV ZIO ECT VE CPLT COUNT: 242 EPISODES
CV ZIO ECT VE CPLT FREQ: NORMAL
CV ZIO ECT VE FREQ: NORMAL
CV ZIO ECT VE TPLT COUNT: 19 EPISODES
CV ZIO ECT VE TPLT FREQ: NORMAL
CV ZIO ECTOPIC SVE COUPLET RAW PERCENT: 0 %
CV ZIO ECTOPIC SVE ISOLATED PERCENT: 0.02 %
CV ZIO ECTOPIC SVE TRIPLET RAW PERCENT: 0 %
CV ZIO ECTOPIC VE COUPLET RAW PERCENT: 0.09 %
CV ZIO ECTOPIC VE ISOLATED PERCENT: 34.66 %
CV ZIO ECTOPIC VE TRIPLET RAW PERCENT: 0.01 %
CV ZIO ENROLLMENT END: NORMAL
CV ZIO ENROLLMENT START: NORMAL
CV ZIO L BIGEMINY DUR: 1270.2 SEC
CV ZIO L BIGEMINY END: NORMAL
CV ZIO L BIGEMINY START: NORMAL
CV ZIO L TRIGEMINY DUR: 1401.1 SEC
CV ZIO L TRIGEMINY END: NORMAL
CV ZIO L TRIGEMINY START: NORMAL
CV ZIO PATIENT EVENTS DIARIES: 0
CV ZIO PATIENT EVENTS TRIGGERS: 0
CV ZIO PAUSE COUNT: 0
CV ZIO PRESCRIPTION STATUS: NORMAL
CV ZIO SVT AVG BPM: 127 BPM
CV ZIO SVT BPM HIGH: 152 BPM
CV ZIO SVT BPM LOW: 92 BPM
CV ZIO SVT COUNT: 2
CV ZIO SVT F EPI AVG BPM: 125 BPM
CV ZIO SVT F EPI BEATS: 15 BEATS
CV ZIO SVT F EPI BPM HIGH: 152 BPM
CV ZIO SVT F EPI BPM LOW: 92 BPM
CV ZIO SVT F EPI DUR: 7.4 SEC
CV ZIO SVT F EPI END: NORMAL
CV ZIO SVT F EPI START: NORMAL
CV ZIO SVT L EPI AVG BPM: 125 BPM
CV ZIO SVT L EPI BEATS: 15 BEATS
CV ZIO SVT L EPI BPM HIGH: 152 BPM
CV ZIO SVT L EPI BPM LOW: 92 BPM
CV ZIO SVT L EPI DUR: 7.4 SEC
CV ZIO SVT L EPI END: NORMAL
CV ZIO SVT L EPI START: NORMAL
CV ZIO TOTAL  ENROLLMENT PERIOD: NORMAL
CV ZIO VT AVG BPM: 113 BPM
CV ZIO VT BPM HIGH: 132 BPM
CV ZIO VT BPM LOW: 83 BPM
CV ZIO VT COUNT: 1
CV ZIO VT F EPI AVG BPM: 113
CV ZIO VT F EPI BEATS: 4 BEATS
CV ZIO VT F EPI BPM HIGH: 132
CV ZIO VT F EPI BPM LOW: 83
CV ZIO VT F EPI DUR: 2 SEC
CV ZIO VT F EPI END: NORMAL
CV ZIO VT F EPI START: NORMAL
CV ZIO VT L EPI AVG BPM: 113
CV ZIO VT L EPI BEATS: 4 BEATS
CV ZIO VT L EPI BPM HIGH: 132 BPM
CV ZIO VT L EPI BPM LOW: 83 BPM
CV ZIO VT L EPI DUR: 2
CV ZIO VT L EPI END: NORMAL
CV ZIO VT L EPI START: NORMAL

## 2025-03-17 NOTE — PROGRESS NOTES
"Chief Complaint  Establish Care, Hypertension, and Diabetes    Subjective          Qasim Ch presents to [facility] for     [facility] in [place]  Establish care, former patient of Narinder Ortiz MD.     Diabetes  Patient does not check blood sugar at home.  Associated symptoms include None  Per patient current diet is Well Balanced.  Patient does avoid concentrated sweets.  Patient does not see podiatry.  Patient see's Dr. Enriquez's for diabetic eye exam and last eye exam was .  Patient reports they are taking medications as prescribed and they are not having side effects.  Last A1c was 5.5 on 02/11/2025.     Diabetic Foot Exam Performed and Monofilament Test Performed    Hypertension  Patient does not check blood pressure at home regularly.  On occasion, will check it and it runs <140/90.  Patient denies  blurred vision, chest pain, dyspnea, headache, palpitations, and peripheral edema   Patient reports they are taking medications as prescribed and they are not having side effects.      Objective   Vital Signs:   /86 (BP Location: Right arm, Patient Position: Sitting, Cuff Size: Adult)   Pulse 64   Temp 98.6 °F (37 °C) (Temporal)   Resp 18   Ht 181.6 cm (71.5\")   Wt 131 kg (289 lb)   SpO2 97% Comment: room air  BMI 39.75 kg/m²     Physical Exam  Vitals and nursing note reviewed.   Constitutional:       General: He is not in acute distress.     Appearance: Normal appearance. He is not ill-appearing or diaphoretic.   HENT:      Head: Normocephalic and atraumatic.      Nose: No congestion or rhinorrhea.   Eyes:      Extraocular Movements: Extraocular movements intact.      Pupils: Pupils are equal, round, and reactive to light.   Cardiovascular:      Rate and Rhythm: Normal rate and regular rhythm.      Pulses: Normal pulses.   Pulmonary:      Effort: Pulmonary effort is normal.      Breath sounds: Normal breath sounds.   Musculoskeletal:         General: Normal range of motion.      " Cervical back: Normal range of motion.   Feet:      Right foot:      Protective Sensation: 10 sites tested.  10 sites sensed.      Left foot:      Protective Sensation: 10 sites tested.  10 sites sensed.   Skin:     General: Skin is warm and dry.   Neurological:      Mental Status: He is alert and oriented to person, place, and time.   Psychiatric:         Mood and Affect: Mood normal.         Behavior: Behavior normal.        Result Review :                 Assessment and Plan    Diagnoses and all orders for this visit:    1. Type 2 diabetes mellitus without complication, without long-term current use of insulin (Primary)  Overview:  Regimen: Rybelsus 14 mg qd, Metformin 1000 mg bid.  Is on ACEi and statin.    A1c 5.5 02/11/2025    Assessment & Plan:  Controlled. Compliant.  Continue current regimen.  Repeat A1c in about 3 months.      2. Primary hypertension  Overview:  Regimen: Cardiazem 120 mg qd (for PVCs), lisinopril 20 mg qd.    Assessment & Plan:  Borderline control, 130s/80s.  Counseled on diet, exercise, and weight loss, with specific dietary interventions discussed in detail.  We will continue to monitor closely.      3. Hypertriglyceridemia  Overview:  Lab Results   Component Value Date    CHOL 133 01/04/2022    CHLPL 74 (L) 02/04/2025    TRIG 99 02/04/2025    HDL 26 (L) 02/04/2025    LDL 29 02/04/2025       Assessment & Plan:  Cholesterol is arguably too low.  We will repeat a lipid panel in 3 months.  Consider moving to a lower intensity statin if total cholesterol and LDL remain low.      4. Class 2 severe obesity due to excess calories with serious comorbidity and body mass index (BMI) of 36.0 to 36.9 in adult  Overview:  Counseled on diet, exercise, and weight loss, with specific dietary interventions discussed in detail.      5. Degeneration of intervertebral disc of lumbar region with discogenic back pain and lower extremity pain  Overview:  Chronic, longstanding issue, s/p multiple  procedures.  Has involvement of multiple joints in addition to lower back including bilateral shoulders.  Hematology is working up for rheumatological process.  He does not desire an increase in his gabapentin for now.  Counseled on diet, exercise, and weight loss, is agreeable to implement lifestyle changes.  Recommended daily turmeric and resveratrol.  We will f/u in 3 months.           Follow Up   Return in about 3 months (around 6/19/2025).      Rohith Chan MD    NOTE: [employer], per Health Information accessibility laws, allows progress notes to be visible to patients. Please note that these notes will include professional medical terminology that may be somewhat confusing without some interpretation from your medical professional team. The intent of progress notes is to communicate information between any medical professionals involved in your case, or to serve as future reference for myself or any other provider when reviewing your case, as well as a reference for the patient viewing the record. Please ask a member of the medical team if you have any questions about terminology or content of note.    DISCLAIMER: Part of this note may be an electronic transcription/translation of spoken language to printed text using the Dragon Dictation System.

## 2025-03-19 ENCOUNTER — OFFICE VISIT (OUTPATIENT)
Dept: FAMILY MEDICINE CLINIC | Facility: CLINIC | Age: 55
End: 2025-03-19
Payer: COMMERCIAL

## 2025-03-19 VITALS
TEMPERATURE: 98.6 F | BODY MASS INDEX: 39.14 KG/M2 | RESPIRATION RATE: 18 BRPM | OXYGEN SATURATION: 97 % | DIASTOLIC BLOOD PRESSURE: 86 MMHG | HEIGHT: 72 IN | SYSTOLIC BLOOD PRESSURE: 142 MMHG | WEIGHT: 289 LBS | HEART RATE: 64 BPM

## 2025-03-19 DIAGNOSIS — E66.812 CLASS 2 SEVERE OBESITY DUE TO EXCESS CALORIES WITH SERIOUS COMORBIDITY AND BODY MASS INDEX (BMI) OF 36.0 TO 36.9 IN ADULT: Chronic | ICD-10-CM

## 2025-03-19 DIAGNOSIS — E66.01 CLASS 2 SEVERE OBESITY DUE TO EXCESS CALORIES WITH SERIOUS COMORBIDITY AND BODY MASS INDEX (BMI) OF 36.0 TO 36.9 IN ADULT: Chronic | ICD-10-CM

## 2025-03-19 DIAGNOSIS — M51.362 DEGENERATION OF INTERVERTEBRAL DISC OF LUMBAR REGION WITH DISCOGENIC BACK PAIN AND LOWER EXTREMITY PAIN: Chronic | ICD-10-CM

## 2025-03-19 DIAGNOSIS — I10 PRIMARY HYPERTENSION: Chronic | ICD-10-CM

## 2025-03-19 DIAGNOSIS — E11.9 TYPE 2 DIABETES MELLITUS WITHOUT COMPLICATION, WITHOUT LONG-TERM CURRENT USE OF INSULIN: Primary | Chronic | ICD-10-CM

## 2025-03-19 DIAGNOSIS — E78.1 HYPERTRIGLYCERIDEMIA: Chronic | ICD-10-CM

## 2025-03-19 NOTE — ASSESSMENT & PLAN NOTE
Borderline control, 130s/80s.  Counseled on diet, exercise, and weight loss, with specific dietary interventions discussed in detail.  We will continue to monitor closely.

## 2025-03-19 NOTE — ASSESSMENT & PLAN NOTE
Cholesterol is arguably too low.  We will repeat a lipid panel in 3 months.  Consider moving to a lower intensity statin if total cholesterol and LDL remain low.

## 2025-04-15 DIAGNOSIS — E11.9 TYPE 2 DIABETES MELLITUS WITHOUT COMPLICATION, WITHOUT LONG-TERM CURRENT USE OF INSULIN: ICD-10-CM

## 2025-04-21 DIAGNOSIS — E11.9 TYPE 2 DIABETES MELLITUS WITHOUT COMPLICATION, WITHOUT LONG-TERM CURRENT USE OF INSULIN: ICD-10-CM

## 2025-04-21 DIAGNOSIS — I10 PRIMARY HYPERTENSION: ICD-10-CM

## 2025-04-21 RX ORDER — LISINOPRIL 20 MG/1
20 TABLET ORAL DAILY
Qty: 90 TABLET | Refills: 1 | Status: SHIPPED | OUTPATIENT
Start: 2025-04-21

## 2025-04-21 RX ORDER — PANTOPRAZOLE SODIUM 40 MG/1
40 TABLET, DELAYED RELEASE ORAL DAILY
Qty: 90 TABLET | Refills: 1 | Status: SHIPPED | OUTPATIENT
Start: 2025-04-21

## 2025-04-25 RX ORDER — GABAPENTIN 300 MG/1
300 CAPSULE ORAL 2 TIMES DAILY
Qty: 60 CAPSULE | Refills: 1 | Status: SHIPPED | OUTPATIENT
Start: 2025-04-25

## 2025-04-29 ENCOUNTER — TRANSCRIBE ORDERS (OUTPATIENT)
Dept: ADMINISTRATIVE | Facility: HOSPITAL | Age: 55
End: 2025-04-29
Payer: COMMERCIAL

## 2025-04-29 ENCOUNTER — LAB (OUTPATIENT)
Dept: LAB | Facility: HOSPITAL | Age: 55
End: 2025-04-29
Payer: COMMERCIAL

## 2025-04-29 ENCOUNTER — TELEPHONE (OUTPATIENT)
Dept: ONCOLOGY | Facility: CLINIC | Age: 55
End: 2025-04-29
Payer: COMMERCIAL

## 2025-04-29 DIAGNOSIS — D69.6 THROMBOCYTOPENIA: ICD-10-CM

## 2025-04-29 DIAGNOSIS — N40.0 ENLARGED PROSTATE: Primary | ICD-10-CM

## 2025-04-29 DIAGNOSIS — D75.1 POLYCYTHEMIA: ICD-10-CM

## 2025-04-29 DIAGNOSIS — N40.0 ENLARGED PROSTATE: ICD-10-CM

## 2025-04-29 LAB
ALBUMIN SERPL-MCNC: 4.4 G/DL (ref 3.5–5.2)
ALBUMIN/GLOB SERPL: 1.3 G/DL
ALP SERPL-CCNC: 67 U/L (ref 39–117)
ALT SERPL W P-5'-P-CCNC: 26 U/L (ref 1–41)
ANION GAP SERPL CALCULATED.3IONS-SCNC: 10.3 MMOL/L (ref 5–15)
AST SERPL-CCNC: 34 U/L (ref 1–40)
BASOPHILS # BLD AUTO: 0.04 10*3/MM3 (ref 0–0.2)
BASOPHILS NFR BLD AUTO: 0.6 % (ref 0–1.5)
BILIRUB SERPL-MCNC: 0.6 MG/DL (ref 0–1.2)
BUN SERPL-MCNC: 15 MG/DL (ref 6–20)
BUN/CREAT SERPL: 19 (ref 7–25)
C3 SERPL-MCNC: 165 MG/DL (ref 82–167)
C4 SERPL-MCNC: 23 MG/DL (ref 14–44)
CALCIUM SPEC-SCNC: 9.7 MG/DL (ref 8.6–10.5)
CHLORIDE SERPL-SCNC: 104 MMOL/L (ref 98–107)
CO2 SERPL-SCNC: 23.7 MMOL/L (ref 22–29)
CREAT SERPL-MCNC: 0.79 MG/DL (ref 0.76–1.27)
DEPRECATED RDW RBC AUTO: 43.5 FL (ref 37–54)
EGFRCR SERPLBLD CKD-EPI 2021: 106.9 ML/MIN/1.73
EOSINOPHIL # BLD AUTO: 0.16 10*3/MM3 (ref 0–0.4)
EOSINOPHIL NFR BLD AUTO: 2.3 % (ref 0.3–6.2)
ERYTHROCYTE [DISTWIDTH] IN BLOOD BY AUTOMATED COUNT: 13 % (ref 12.3–15.4)
GLOBULIN UR ELPH-MCNC: 3.3 GM/DL
GLUCOSE SERPL-MCNC: 87 MG/DL (ref 65–99)
HCT VFR BLD AUTO: 46.3 % (ref 37.5–51)
HGB BLD-MCNC: 15.8 G/DL (ref 13–17.7)
IMM GRANULOCYTES # BLD AUTO: 0.03 10*3/MM3 (ref 0–0.05)
IMM GRANULOCYTES NFR BLD AUTO: 0.4 % (ref 0–0.5)
LYMPHOCYTES # BLD AUTO: 1.66 10*3/MM3 (ref 0.7–3.1)
LYMPHOCYTES NFR BLD AUTO: 24.2 % (ref 19.6–45.3)
MCH RBC QN AUTO: 31 PG (ref 26.6–33)
MCHC RBC AUTO-ENTMCNC: 34.1 G/DL (ref 31.5–35.7)
MCV RBC AUTO: 91 FL (ref 79–97)
MONOCYTES # BLD AUTO: 0.83 10*3/MM3 (ref 0.1–0.9)
MONOCYTES NFR BLD AUTO: 12.1 % (ref 5–12)
NEUTROPHILS NFR BLD AUTO: 4.15 10*3/MM3 (ref 1.7–7)
NEUTROPHILS NFR BLD AUTO: 60.4 % (ref 42.7–76)
NRBC BLD AUTO-RTO: 0 /100 WBC (ref 0–0.2)
PLATELET # BLD AUTO: 141 10*3/MM3 (ref 140–450)
PMV BLD AUTO: 10.9 FL (ref 6–12)
POTASSIUM SERPL-SCNC: 4.4 MMOL/L (ref 3.5–5.2)
PROT SERPL-MCNC: 7.7 G/DL (ref 6–8.5)
PSA SERPL-MCNC: 0.39 NG/ML (ref 0–4)
RBC # BLD AUTO: 5.09 10*6/MM3 (ref 4.14–5.8)
SODIUM SERPL-SCNC: 138 MMOL/L (ref 136–145)
WBC NRBC COR # BLD AUTO: 6.87 10*3/MM3 (ref 3.4–10.8)

## 2025-04-29 PROCEDURE — 86160 COMPLEMENT ANTIGEN: CPT

## 2025-04-29 PROCEDURE — 85670 THROMBIN TIME PLASMA: CPT

## 2025-04-29 PROCEDURE — 85598 HEXAGNAL PHOSPH PLTLT NEUTRL: CPT

## 2025-04-29 PROCEDURE — 86147 CARDIOLIPIN ANTIBODY EA IG: CPT

## 2025-04-29 PROCEDURE — 85613 RUSSELL VIPER VENOM DILUTED: CPT

## 2025-04-29 PROCEDURE — 85732 THROMBOPLASTIN TIME PARTIAL: CPT

## 2025-04-29 PROCEDURE — 85025 COMPLETE CBC W/AUTO DIFF WBC: CPT

## 2025-04-29 PROCEDURE — 36415 COLL VENOUS BLD VENIPUNCTURE: CPT

## 2025-04-29 PROCEDURE — 85705 THROMBOPLASTIN INHIBITION: CPT

## 2025-04-29 PROCEDURE — 84153 ASSAY OF PSA TOTAL: CPT

## 2025-04-29 PROCEDURE — 86146 BETA-2 GLYCOPROTEIN ANTIBODY: CPT

## 2025-04-29 PROCEDURE — 80053 COMPREHEN METABOLIC PANEL: CPT

## 2025-04-29 PROCEDURE — 86038 ANTINUCLEAR ANTIBODIES: CPT

## 2025-04-29 NOTE — TELEPHONE ENCOUNTER
Contacted the patient regarding his message we received. I informed him that the labs that Dr. Ortiz would have ordered were already being completed therefore no new orders were placed by our office. I informed him that his platelet level is WNL at 141. He v/u. I advised for him to contact the office if he has any further questions or concerns. He confirmed.

## 2025-04-29 NOTE — TELEPHONE ENCOUNTER
"  Caller: Qasim Ch \"FLORY\"    Relationship: Self    Best call back number: [phone]      What was the call regarding: PATIENT IS AT [facility] TO GET LABS DONE FOR ANOTHER DOCTOR AND PATIENT WANTED TO SEE IF DR SEALS WANTED TO ORDER ANY LABS WHILE HE WAS THERE NOW.    PATIENT IS THERE NOW AND JUST WANTED TO CHECK     "

## 2025-04-30 LAB
ANA SER QL: NEGATIVE
B2 GLYCOPROT1 IGA SER-ACNC: <9 GPI IGA UNITS (ref 0–25)
B2 GLYCOPROT1 IGG SER-ACNC: <9 GPI IGG UNITS (ref 0–20)
B2 GLYCOPROT1 IGM SER-ACNC: <9 GPI IGM UNITS (ref 0–32)
CARDIOLIPIN IGA SER IA-ACNC: <9 APL U/ML (ref 0–11)
CARDIOLIPIN IGG SER IA-ACNC: <9 GPL U/ML (ref 0–14)
CARDIOLIPIN IGM SER IA-ACNC: 18 MPL U/ML (ref 0–12)

## 2025-05-01 LAB
APTT HEX PL PPP: 2 SEC (ref 0–11)
APTT SCREEN TO CONFIRM RATIO: 0.92 RATIO (ref 0–1.34)
APTT-LA 1H NP PPP: 43.7 SEC (ref 0–40.5)
CONFIRM APTT/NORMAL: 42.4 SEC (ref 0–47.6)
DRVVT SCREEN TO CONFIRM RATIO: 1.3 RATIO (ref 0.8–1.2)
LA 2 SCREEN W REFLEX-IMP: ABNORMAL
MIXING APTT: 39.5 SEC (ref 0–40.5)
MIXING DRVVT: 41.6 SEC (ref 0–40.4)
SCREEN APTT: 44 SEC (ref 0–43.5)
SCREEN DRVVT: 52 SEC (ref 0–47)
THROMBIN TIME: 19.9 SEC (ref 0–23)

## 2025-05-06 ENCOUNTER — OFFICE VISIT (OUTPATIENT)
Dept: FAMILY MEDICINE CLINIC | Facility: CLINIC | Age: 55
End: 2025-05-06
Payer: COMMERCIAL

## 2025-05-06 VITALS
BODY MASS INDEX: 38.63 KG/M2 | TEMPERATURE: 98.7 F | SYSTOLIC BLOOD PRESSURE: 126 MMHG | WEIGHT: 285.2 LBS | HEART RATE: 51 BPM | OXYGEN SATURATION: 96 % | RESPIRATION RATE: 18 BRPM | DIASTOLIC BLOOD PRESSURE: 82 MMHG | HEIGHT: 72 IN

## 2025-05-06 DIAGNOSIS — E66.01 CLASS 2 SEVERE OBESITY DUE TO EXCESS CALORIES WITH SERIOUS COMORBIDITY AND BODY MASS INDEX (BMI) OF 36.0 TO 36.9 IN ADULT: Chronic | ICD-10-CM

## 2025-05-06 DIAGNOSIS — I10 PRIMARY HYPERTENSION: Chronic | ICD-10-CM

## 2025-05-06 DIAGNOSIS — K58.2 IRRITABLE BOWEL SYNDROME WITH BOTH CONSTIPATION AND DIARRHEA: ICD-10-CM

## 2025-05-06 DIAGNOSIS — E11.9 TYPE 2 DIABETES MELLITUS WITHOUT COMPLICATION, WITHOUT LONG-TERM CURRENT USE OF INSULIN: Chronic | ICD-10-CM

## 2025-05-06 DIAGNOSIS — K57.30 DIVERTICULOSIS OF COLON: ICD-10-CM

## 2025-05-06 DIAGNOSIS — R19.7 DIARRHEA, UNSPECIFIED TYPE: Primary | ICD-10-CM

## 2025-05-06 DIAGNOSIS — E66.812 CLASS 2 SEVERE OBESITY DUE TO EXCESS CALORIES WITH SERIOUS COMORBIDITY AND BODY MASS INDEX (BMI) OF 36.0 TO 36.9 IN ADULT: Chronic | ICD-10-CM

## 2025-05-06 RX ORDER — COLESTIPOL HYDROCHLORIDE 5 G/5G
5 GRANULE, FOR SUSPENSION ORAL 2 TIMES DAILY
Qty: 30 EACH | Refills: 1 | Status: SHIPPED | OUTPATIENT
Start: 2025-05-06

## 2025-05-06 RX ORDER — SACCHAROMYCES BOULARDII 250 MG
250 CAPSULE ORAL 2 TIMES DAILY
Qty: 30 CAPSULE | Refills: 2 | Status: SHIPPED | OUTPATIENT
Start: 2025-05-06

## 2025-05-06 RX ORDER — DIPHENOXYLATE HYDROCHLORIDE AND ATROPINE SULFATE 2.5; .025 MG/1; MG/1
1 TABLET ORAL 4 TIMES DAILY PRN
Qty: 30 TABLET | Refills: 0 | Status: SHIPPED | OUTPATIENT
Start: 2025-05-06 | End: 2025-05-19

## 2025-05-06 NOTE — PROGRESS NOTES
Chief Complaint  Diarrhea, Abdominal Pain, Hypertension, and Diabetes    Subjective     CC  Problem List  Visit Diagnosis   Encounters  Notes  Medications  Labs  Result Review Imaging  Media    Qasim Ch presents to [facility] for   History of Present Illness  Patient presents to the office today with complaint of abdominal pain and diarrhea that started last Friday. He reports excessive belching, increased flatus, watery stools up to 10 times daily and states he had bilateral leg cramps Friday and Saturday, he denies any nausea or vomiting, blood in stool or loss of appetite.  Diarrhea   This is a new problem. The current episode started in the past 7 days. The problem occurs 5 to 10 times per day. The problem has been unchanged. The stool consistency is described as Watery. The patient states that diarrhea awakens him from sleep. Associated symptoms include abdominal pain, bloating and increased flatus. Pertinent negatives include no arthralgias, chills, coughing, fever, headaches, myalgias, sweats, URI, vomiting or weight loss. Associated symptoms comments: Fatigue, leg cramps. Nothing aggravates the symptoms. There are no known risk factors. Treatments tried: Imodium, Gas X.   Hypertension  Chronicity:  Chronic  Onset:  More than 1 year ago  Progression since onset:  Unchanged  Associated symptoms: no anxiety, no blurred vision, no chest pain, no headaches, no malaise/fatigue, no palpitations, no peripheral edema, no shortness of breath, no sweats, no dyspnea with exertion and no dizziness    CAD risks:  Male gender, obesity, diabetes mellitus and dyslipidemia  Current therapy:  ACE inhibitors and beta blockers  Improvement on treatment:  Moderate  Compliance problems:  No compliance problems  Diabetes  Visit type:  Follow-up  Diabetes type:  Type 2  Associated symptoms:     no blurred vision, no chest pain, no fatigue, no foot ulcerations, no polydipsia, no  "polyuria, no visual change, no weakness and no weight loss    Hypoglycemia symptoms:     no confusion, no dizziness, no headaches, no mood changes, is not nervous/anxious, no speech difficulty and no sweats    CAD risks:  Diabetes mellitus, dyslipidemia, male sex, obesity and hypertension  Current treatments:  Oral agent (monotherapy) (Metformin, Semaglutide)  Treatment compliance:  Most of the time  Blood glucose ranges (mg/dl):        ACE-I / ARB:  Is being taken      Review of Systems   Constitutional:  Negative for chills, fatigue, fever, malaise/fatigue and unexpected weight loss.   Eyes:  Negative for blurred vision.   Respiratory:  Negative for cough and shortness of breath.    Cardiovascular:  Negative for chest pain and palpitations.   Gastrointestinal:  Positive for abdominal pain, bloating, diarrhea and flatus. Negative for blood in stool, nausea and vomiting.   Endocrine: Negative for cold intolerance, heat intolerance, polydipsia and polyuria.   Musculoskeletal:  Negative for arthralgias and myalgias.   Neurological:  Negative for dizziness, speech difficulty, weakness and confusion.   Hematological:  Negative for adenopathy. Does not bruise/bleed easily.   Psychiatric/Behavioral:  The patient is not nervous/anxious.         Objective   Vital Signs:   /82 (BP Location: Right arm, Patient Position: Sitting, Cuff Size: Adult)   Pulse 51   Temp 98.7 °F (37.1 °C) (Temporal)   Resp 18   Ht 181.6 cm (71.5\")   Wt 129 kg (285 lb 3.2 oz)   SpO2 96%   BMI 39.22 kg/m²     Physical Exam  Constitutional:       General: He is not in acute distress.     Appearance: He is obese.   Cardiovascular:      Rate and Rhythm: Normal rate.      Heart sounds: No murmur heard.  Pulmonary:      Effort: Pulmonary effort is normal.      Breath sounds: Normal breath sounds. No wheezing.   Abdominal:      General: Abdomen is flat. Bowel sounds are normal.      Palpations: Abdomen is soft. There is no hepatomegaly, " splenomegaly or mass.      Tenderness: There is no abdominal tenderness.   Musculoskeletal:      Cervical back: Neck supple.      Right lower leg: No edema.      Left lower leg: No edema.   Lymphadenopathy:      Cervical: No cervical adenopathy.   Skin:     Findings: No rash.   Neurological:      Mental Status: He is alert.        Result Review :Labs  Result Review  Imaging  Med Tab  Media                 Assessment and Plan CC Problem List  Visit Diagnosis  ROS  Review (Popup)  [facility] Maintenance  Quality  BestPractice  Medications  SmartSets  SnapShot Encounters  Media  Problem List Items Addressed This Visit          Unprioritized    Diabetes mellitus, type II (Chronic)    Overview   Regimen: Rybelsus 14 mg qd, Metformin 1000 mg bid.  Is on ACEi and statin.    A1c 5.5 02/11/2025         Current Assessment & Plan   Diabetes is stable.   Continue current treatment regimen.  Diabetes will be reassessed in 3 months         Hypertension (Chronic)    Overview   Regimen: Cardiazem 120 mg qd (for PVCs), lisinopril 20 mg qd.         Current Assessment & Plan   Hypertension is stable and controlled  Continue current treatment regimen.  Blood pressure will be reassessed in 3 months.         Class 2 severe obesity due to excess calories with serious comorbidity and body mass index (BMI) of 36.0 to 36.9 in adult (Chronic)    Overview   Counseled on diet, exercise, and weight loss, with specific dietary interventions discussed in detail.         Current Assessment & Plan   Patient's (Body mass index is 39.22 kg/m².) indicates that they are obese (BMI >30) with health conditions that include hypertension, diabetes mellitus, and dyslipidemias . Weight is unchanged. BMI  is above average; BMI management plan is completed. We discussed low calorie, low carb based diet program, portion control, and increasing exercise.          Irritable bowel syndrome    Current Assessment & Plan   Suspect exacerbation Rx sxs and  follow.          Diverticulosis of colon    Overview   S/p partial bowel resection,  2015, colonoscopy, 01/2021           Current Assessment & Plan   Sxs are not consistent with diverticulitis          Other Visit Diagnoses         Diarrhea, unspecified type    -  Primary    Rx sxs and follow up if no improvement over 1 week.    Relevant Medications    colestipol (Colestid) 5 g granules    diphenoxylate-atropine (Lomotil) 2.5-0.025 MG per tablet    saccharomyces boulardii (Florastor) 250 MG capsule    Other Relevant Orders    C-reactive protein (Completed)    CBC w AUTO Differential (Completed)    Clostridioides difficile Toxin, PCR - , Per Rectum    Gastrointestinal Panel, PCR - Stool, Per Rectum    Comprehensive Metabolic Panel (Completed)            Follow Up Instructions Charge Capture  Follow-up Communications  Return in about 3 months (around 8/6/2025), or if symptoms worsen or fail to improve.  Patient was given instructions and counseling regarding his condition or for health maintenance advice. Please see specific information pulled into the AVS if appropriate.

## 2025-05-07 LAB
ALBUMIN SERPL-MCNC: 4.4 G/DL (ref 3.8–4.9)
ALP SERPL-CCNC: 70 IU/L (ref 44–121)
ALT SERPL-CCNC: 31 IU/L (ref 0–44)
AST SERPL-CCNC: 31 IU/L (ref 0–40)
BASOPHILS # BLD AUTO: 0.1 X10E3/UL (ref 0–0.2)
BASOPHILS NFR BLD AUTO: 1 %
BILIRUB SERPL-MCNC: 0.7 MG/DL (ref 0–1.2)
BUN SERPL-MCNC: 13 MG/DL (ref 6–24)
BUN/CREAT SERPL: 16 (ref 9–20)
C DIFF TOX GENS STL QL NAA+PROBE: NEGATIVE
CALCIUM SERPL-MCNC: 9.6 MG/DL (ref 8.7–10.2)
CHLORIDE SERPL-SCNC: 103 MMOL/L (ref 96–106)
CO2 SERPL-SCNC: 23 MMOL/L (ref 20–29)
CREAT SERPL-MCNC: 0.83 MG/DL (ref 0.76–1.27)
CRP SERPL-MCNC: 2 MG/L (ref 0–10)
EGFRCR SERPLBLD CKD-EPI 2021: 105 ML/MIN/1.73
EOSINOPHIL # BLD AUTO: 0.1 X10E3/UL (ref 0–0.4)
EOSINOPHIL NFR BLD AUTO: 2 %
ERYTHROCYTE [DISTWIDTH] IN BLOOD BY AUTOMATED COUNT: 13.3 % (ref 11.6–15.4)
GLOBULIN SER CALC-MCNC: 2.9 G/DL (ref 1.5–4.5)
GLUCOSE SERPL-MCNC: 72 MG/DL (ref 70–99)
HCT VFR BLD AUTO: 46.8 % (ref 37.5–51)
HGB BLD-MCNC: 15.9 G/DL (ref 13–17.7)
IMM GRANULOCYTES # BLD AUTO: 0 X10E3/UL (ref 0–0.1)
IMM GRANULOCYTES NFR BLD AUTO: 0 %
LYMPHOCYTES # BLD AUTO: 2 X10E3/UL (ref 0.7–3.1)
LYMPHOCYTES NFR BLD AUTO: 23 %
MCH RBC QN AUTO: 31.6 PG (ref 26.6–33)
MCHC RBC AUTO-ENTMCNC: 34 G/DL (ref 31.5–35.7)
MCV RBC AUTO: 93 FL (ref 79–97)
MONOCYTES # BLD AUTO: 1 X10E3/UL (ref 0.1–0.9)
MONOCYTES NFR BLD AUTO: 11 %
NEUTROPHILS # BLD AUTO: 5.7 X10E3/UL (ref 1.4–7)
NEUTROPHILS NFR BLD AUTO: 63 %
PLATELET # BLD AUTO: 156 X10E3/UL (ref 150–450)
POTASSIUM SERPL-SCNC: 4.4 MMOL/L (ref 3.5–5.2)
PROT SERPL-MCNC: 7.3 G/DL (ref 6–8.5)
RBC # BLD AUTO: 5.03 X10E6/UL (ref 4.14–5.8)
SODIUM SERPL-SCNC: 139 MMOL/L (ref 134–144)
WBC # BLD AUTO: 8.9 X10E3/UL (ref 3.4–10.8)

## 2025-05-08 LAB
ADV 40+41 DNA STL QL NAA+NON-PROBE: NOT DETECTED
ASTRO TYP 1-8 RNA STL QL NAA+NON-PROBE: NOT DETECTED
C CAYETANENSIS DNA STL QL NAA+NON-PROBE: NOT DETECTED
C COLI+JEJ+UPSA DNA STL QL NAA+NON-PROBE: NOT DETECTED
C DIF TOX TCDA+TCDB STL QL NAA+NON-PROBE: NOT DETECTED
CRYPTOSP DNA STL QL NAA+NON-PROBE: NOT DETECTED
E COLI O157 DNA STL QL NAA+NON-PROBE: NORMAL
E HISTOLYT DNA STL QL NAA+NON-PROBE: NOT DETECTED
EAEC PAA PLAS AGGR+AATA ST NAA+NON-PRB: NOT DETECTED
EC STX1+STX2 GENES STL QL NAA+NON-PROBE: NOT DETECTED
EPEC EAE GENE STL QL NAA+NON-PROBE: NOT DETECTED
ETEC LTA+ST1A+ST1B TOX ST NAA+NON-PROBE: NOT DETECTED
G LAMBLIA DNA STL QL NAA+NON-PROBE: NOT DETECTED
NOROVIRUS GI+II RNA STL QL NAA+NON-PROBE: NOT DETECTED
P SHIGELLOIDES DNA STL QL NAA+NON-PROBE: NOT DETECTED
RVA RNA STL QL NAA+NON-PROBE: NOT DETECTED
S ENT+BONG DNA STL QL NAA+NON-PROBE: NOT DETECTED
SAPO I+II+IV+V RNA STL QL NAA+NON-PROBE: NOT DETECTED
SHIGELLA SP+EIEC IPAH ST NAA+NON-PROBE: NOT DETECTED
V CHOL+PARA+VUL DNA STL QL NAA+NON-PROBE: NOT DETECTED
V CHOLERAE DNA STL QL NAA+NON-PROBE: NOT DETECTED
Y ENTEROCOL DNA STL QL NAA+NON-PROBE: NOT DETECTED

## 2025-05-09 DIAGNOSIS — E11.9 TYPE 2 DIABETES MELLITUS WITHOUT COMPLICATION, WITHOUT LONG-TERM CURRENT USE OF INSULIN: ICD-10-CM

## 2025-05-09 RX ORDER — ORAL SEMAGLUTIDE 14 MG/1
1 TABLET ORAL
Qty: 30 TABLET | Refills: 11 | Status: SHIPPED | OUTPATIENT
Start: 2025-05-09

## 2025-05-09 NOTE — TELEPHONE ENCOUNTER
He has since established care with you and he said that the pharmacy keeps sending this to his prior physician and he has asked if we can send it to you for refill

## 2025-05-13 NOTE — ASSESSMENT & PLAN NOTE
Patient's (Body mass index is 39.22 kg/m².) indicates that they are obese (BMI >30) with health conditions that include hypertension, diabetes mellitus, and dyslipidemias . Weight is unchanged. BMI  is above average; BMI management plan is completed. We discussed low calorie, low carb based diet program, portion control, and increasing exercise.

## 2025-05-15 ENCOUNTER — TELEPHONE (OUTPATIENT)
Dept: CARDIOLOGY | Facility: CLINIC | Age: 55
End: 2025-05-15

## 2025-05-15 NOTE — TELEPHONE ENCOUNTER
The [facility] received a fax that requires your attention. The document has been indexed to the patient’s chart for your review.      Reason for sending: EXTERNAL MEDICAL RECORD NOTIFICATION     Documents Description: CARDIAC CLEARANCE REQ-[facility] [facility] [facility]-5.15.25    Name of Sender: [facility] [place]     Date Indexed: 5.15.25

## 2025-05-19 ENCOUNTER — OFFICE VISIT (OUTPATIENT)
Dept: CARDIOLOGY | Facility: CLINIC | Age: 55
End: 2025-05-19
Payer: COMMERCIAL

## 2025-05-19 VITALS
HEART RATE: 66 BPM | DIASTOLIC BLOOD PRESSURE: 68 MMHG | WEIGHT: 289 LBS | OXYGEN SATURATION: 96 % | HEIGHT: 71 IN | SYSTOLIC BLOOD PRESSURE: 120 MMHG | RESPIRATION RATE: 16 BRPM | BODY MASS INDEX: 40.46 KG/M2

## 2025-05-19 DIAGNOSIS — I49.3 PVC (PREMATURE VENTRICULAR CONTRACTION): ICD-10-CM

## 2025-05-19 DIAGNOSIS — R07.89 ATYPICAL CHEST PAIN: ICD-10-CM

## 2025-05-19 DIAGNOSIS — E11.9 TYPE 2 DIABETES MELLITUS WITHOUT COMPLICATION, WITHOUT LONG-TERM CURRENT USE OF INSULIN: Primary | Chronic | ICD-10-CM

## 2025-05-19 DIAGNOSIS — R06.02 SHORTNESS OF BREATH: ICD-10-CM

## 2025-05-19 DIAGNOSIS — G47.33 OBSTRUCTIVE SLEEP APNEA SYNDROME: Chronic | ICD-10-CM

## 2025-05-19 NOTE — PROGRESS NOTES
Date of Office Visit: 2025  Encounter Provider: Dr. Billy Gonzalez  Place of Service: [facility]ARDI[facility] [place]  Patient Name: Qasim Ch  :1970  Rohith Chan MD    Chief Complaint   Patient presents with    Follow-up    PVCs    Shortness of Breath    Chest Pain     History of Present Illness:    I am pleased to see Mr. Ch in my office today as a follow-up.    As you know, patient is 55 years old white gentleman whose past medical history is significant for hypertension, hyperlipidemia, diabetes mellitus, history of PVCs, obstructive sleep apnea, who came today for follow-up.    In , patient was evaluated with symptom of shortness of breath and chest pain.  Patient underwent stress echocardiography which showed no myocardial ischemia or infarction.  Ejection fraction was 55 to 60%.  Patient was noted to have frequent PVCs.  Patient was diagnosed with obstructive sleep apnea and CPAP was recommended and started.  Patient continued to have chest discomfort.  Patient underwent CT angiography of coronary arteries and there was no significant coronary artery disease.  CT calcium scores were 0.  In 2025, patient has repeat Holter monitor which showed isolated PVCs with burden less than 1%.    Patient is overall doing well he is asymptomatic patient denies any chest pain or tightness or heaviness.  No orthopnea PND no syncope or presyncope.  No leg edema noted.    At this stage I am very pleased with the patient condition blood pressure is controlled.  PVCs are also controlled with Cardizem CD and Bystolic.  No further cardiac workup needed.  Monitor the blood pressure and bring the logbook..  Compliance with CPAP is emphasized..      Past Medical History:   Diagnosis Date    Arthritis 2018    Bilateral leg cramps     AT [facility]    Colon polyps     FOLLOWED BY DR. SERA LE    DDD (degenerative disc disease), lumbar 2018    L3,L4,L5    Diabetes     TYPE 2, NIDDM    Disorder  of gallbladder     Diverticulitis 09/2016    SIGMOID    Diverticulitis of colon 04/18/2014    Fatty liver     GERD (gastroesophageal reflux disease)     Hyperlipidemia     Hypertension     Hypertriglyceridemia     Hypogonadism 02/07/2013    Impotence of organic origin 02/22/2012    Irritable bowel syndrome 01/14/2013    Lumbar compression fracture     L4    Lumbar radiculopathy     Muscle spasm of left calf     Numbness and tingling of foot     LEFT FOOT, S/P BACK SURGERY    Obesity     WOLF (obstructive sleep apnea)     cpap    Plantar fasciitis 06/09/2016    Polycythemia 02/22/2012    Post laminectomy syndrome     Right sided abdominal pain 02/28/2021    SEEN AT [facility] ER    RUQ pain 05/02/2021    SEEN AT [facility] ER    Spinal stenosis of lumbar region without neurogenic claudication 09/05/2018    Splenomegaly 05/2021    Spondylolisthesis          Past Surgical History:   Procedure Laterality Date    ANAL FISSURECTOMY N/A 04/15/2009    DR.RAYMOND MICHAELS AT [facility]    BACK SURGERY  2018    CHOLECYSTECTOMY  2022    CHOLECYSTECTOMY WITH INTRAOPERATIVE CHOLANGIOGRAM N/A 06/23/2021    Procedure: CHOLECYSTECTOMY LAPAROSCOPIC INTRAOPERATIVE CHOLANGIOGRAM;  Surgeon: Chris Carrion Jr., MD;  Location: [facility];  Service: General;  Laterality: N/A;    COLON SURGERY N/A 09/16/2016    SIGMOID COLECTOMY, D/T DIVERTICULITIS, DR. TYLER DUNBAR AT [facility]    COLONOSCOPY N/A 04/15/2009    MIXED ADENOMATOUS POLYP IN RECTUM, DR. HERLINDA MICHAELS AT [facility]    COLONOSCOPY N/A 01/28/2021    7 MM HYPERPLASTIC POLYP IN RECTUM, MULTIPLE SMALL AND LARGE DIVERTICULA IN ENTIRE COLON, RESCOPE IN 5 YRS, DR. SERA LE AT [facility]    COLONOSCOPY W/ POLYPECTOMY N/A 12/11/2013    2 TUBULOVILLOUS ADENOMA POLYPS IN CECUM, 8 MM TUBULOVILLOUS ADENOMA POLYP IN ASCENDING, 14 MM TUBULOVILLOUS ADENOMA POLYP IN DISTAL ASCENDING, 5 MM TUBULAR ADENOMA POLYP IN ASCENDING, 5 MM BENIGN POLYP IN RECTUM, RESCOPE IN 2 YRS, DR. SERA LE AT [facility]    ENDOSCOPY AND COLONOSCOPY N/A  2014    HEMORRHOIDECTOMY N/A 04/15/2009    DR.RAYMOND MICHAELS AT [facility]    HERNIA REPAIR Left 09/14/2017    lap left inguinal hernia repair, ventral hernia repair, DR. TYLER DUNBAR AT [facility]    LUMBAR EPIDURAL INJECTION N/A     lumbar    LUMBAR SPINE POSTERIOR N/A 12/03/2018    TLIF L3-L5, DR. MOHAMMAD MAJD AT [facility]     TONSILLECTOMY Bilateral     TRIGGER POINT INJECTION  2020    UPPER GASTROINTESTINAL ENDOSCOPY      VENTRAL HERNIA REPAIR N/A 03/01/2018    LAPAROSCOPIC WITH PREVIOUS MESH REMOVAL, DR. TYLER DUNBAR AT [facility]           Current Outpatient Medications:     acetaminophen (TYLENOL) 500 MG tablet, Take 1 tablet by mouth Every 6 (Six) Hours As Needed., Disp: , Rfl:     albuterol sulfate  (90 Base) MCG/ACT inhaler, Inhale 2 puffs Every 6 (Six) Hours As Needed., Disp: , Rfl:     colestipol (Colestid) 5 g granules, Take 5 g by mouth 2 (Two) Times a Day., Disp: 30 each, Rfl: 1    dilTIAZem CD (CARDIZEM CD) 120 MG 24 hr capsule, Take 1 capsule by mouth Daily., Disp: 30 capsule, Rfl: 11    gabapentin (NEURONTIN) 300 MG capsule, Take 1 capsule by mouth 2 (Two) Times a Day., Disp: 60 capsule, Rfl: 1    lisinopril (PRINIVIL,ZESTRIL) 20 MG tablet, Take 1 tablet by mouth Daily., Disp: 90 tablet, Rfl: 1    metFORMIN (GLUCOPHAGE) 1000 MG tablet, TAKE 1 TABLET BY MOUTH TWICE DAILY WITH MEALS, Disp: 180 tablet, Rfl: 1    multivitamin (THERAGRAN) tablet tablet, Take 1 tablet by mouth Every Night. HOLD FOR SURGERY, Disp: , Rfl:     nebivolol (BYSTOLIC) 5 MG tablet, Take 1 tablet by mouth Daily., Disp: , Rfl:     pantoprazole (PROTONIX) 40 MG EC tablet, Take 1 tablet by mouth Daily., Disp: 90 tablet, Rfl: 1    rosuvastatin (CRESTOR) 5 MG tablet, Take 1 tablet by mouth Every Night., Disp: 90 tablet, Rfl: 1    saccharomyces boulardii (Florastor) 250 MG capsule, Take 1 capsule by mouth 2 (Two) Times a Day., Disp: 30 capsule, Rfl: 2    Semaglutide (Rybelsus) 14 MG tablet, Take 1 tablet by mouth Every Morning Before  "Breakfast., Disp: 30 tablet, Rfl: 11      Social History     Socioeconomic History    Marital status:     Number of children: 2   Tobacco Use    Smoking status: Former     Current packs/day: 0.00     Average packs/day: 0.5 packs/day for 10.0 years (5.0 ttl pk-yrs)     Types: Cigarettes     Start date: 2002     Quit date: 2012     Years since quittin.3     Passive exposure: Past    Smokeless tobacco: Current     Types: Chew   Vaping Use    Vaping status: Never Used   Substance and Sexual Activity    Alcohol use: Not Currently    Drug use: Never    Sexual activity: Not Currently     Partners: Female         Review of Systems   Constitutional: Negative for chills and fever.   HENT:  Negative for ear discharge and nosebleeds.    Eyes:  Negative for discharge and redness.   Cardiovascular:  Negative for chest pain, orthopnea, palpitations, paroxysmal nocturnal dyspnea and syncope.   Respiratory:  Negative for cough, shortness of breath and wheezing.    Endocrine: Negative for heat intolerance.   Skin:  Negative for rash.   Musculoskeletal:  Negative for arthritis and myalgias.   Gastrointestinal:  Negative for abdominal pain, melena, nausea and vomiting.   Genitourinary:  Negative for dysuria and hematuria.   Neurological:  Negative for dizziness, light-headedness, numbness and tremors.   Psychiatric/Behavioral:  Negative for depression. The patient is not nervous/anxious.        Procedures    Procedures    No orders to display           Objective:    /68 (BP Location: Right arm, Patient Position: Sitting, Cuff Size: Large Adult)   Pulse 66   Resp 16   Ht 180.3 cm (71\")   Wt 131 kg (289 lb)   SpO2 96%   BMI 40.31 kg/m²         Constitutional:       Appearance: Well-developed.   Eyes:      General: No scleral icterus.        Right eye: No discharge.   HENT:      Head: Normocephalic and atraumatic.   Neck:      Thyroid: No thyromegaly.      Lymphadenopathy: No cervical adenopathy. "   Pulmonary:      Effort: Pulmonary effort is normal. No respiratory distress.      Breath sounds: Normal breath sounds. No wheezing. No rales.   Cardiovascular:      Normal rate. Regular rhythm.      No gallop.    Edema:     Peripheral edema absent.   Abdominal:      Tenderness: There is no abdominal tenderness.   Skin:     Findings: No erythema or rash.   Neurological:      Mental Status: Alert and oriented to person, place, and time.             Assessment:       Diagnosis Plan   1. Type 2 diabetes mellitus without complication, without long-term current use of insulin        2. Obstructive sleep apnea syndrome        3. PVC (premature ventricular contraction)        4. Shortness of breath        5. Atypical chest pain                 Plan:       MDM:    1.  Chest pain:    Patient is chest pain-free.  Previous cardiac workup is negative.  Recommend observation and risk factor modification    2.  PVCs:    Recent Holter monitor showed PVC burden less than 1% continue Cardizem    3.  Hypertension:    Blood pressure is controlled    4.  Obstructive sleep apnea:    Continue CPAP

## 2025-05-21 NOTE — PROGRESS NOTES
HEMATOLOGY ONCOLOGY OUTPATIENT FOLLOW UP       Patient name: Qasim Ch  : 1970  MRN: [id number]  Primary Care Physician: Rohith Chan MD  Referring Physician: Rohith Chan MD  Reason For Consult: Thrombocytopenia.    History of Present Illness:    2025: In the office for the first time to investigate  and treat thrombocytopenia.  He carried a long history of medical problems including hypertension, hypercholesterolemia and a suggestion of cirrhosis of the liver.  He also had undergone surgeries for back pain.  He was being treated for sleep apnea.  Several imaging studies had revealed architecture and morphology of the liver consistent with cirrhosis.  However sometime in  he was seen by a hepatologist and a liver biopsy was obtained.  The final report was of minimal steatosis with sinusoidal dilatation.  There was evidence of scattered nonspecific chronic inflammation but there was not excess iron.  There was periportal fibrosis with focal septa and architectural distortion.  Cirrhosis was not identified.  Nevertheless, in addition, the scans had revealed evidence of portal hypertension and splenomegaly.  No further investigations were undertaken after that.  On review of the records, at least since , intermittently, he had had thrombocytopenia.  However in the months preceding this visit, his platelet count has been dropping progressively.  At the time of this visit the platelet count was less than 80,000/mm³.  He had not had any bleeding, easy bruisability or petechiae.  He had been afebrile and without unintended weight loss.  He denied chest pains, cough or abdominal pain.  He had maintained regular bowel activity and denies dysuria or hematuria.  No melena or hematochezia.  No peripheral edema.  Family history reviewed and discussed.  On exam alert and conversant.  Oriented and in no distress.  No jaundice.  Does appear chronically ill.   Lungs clear.  Heart regular.  Abdomen protuberant and soft.  I believe I can palpate the edge of the liver approximately 3 cm below the costal margin.  I definitely could palpate the spleen approximately 5 cm below the costal margin.  Reviewed all the laboratory exams, all the imaging studies and reports of pathology and discussed with him.  I suspect immune thrombocytopenic purpura but he is splenomegaly most likely plays a role.  However the progressive decline in the platelets requires additional investigation.  Explained the plans.  To have a blood count checked every week until I see him again in approximately 3 weeks with laboratory exams.    2/11/2025: Feeling about the same as on the day of the first visit. He is active and without new limitations. He eats well and has not had any weight loss. Denies hair loss, oral ulceration of skin rash. He reported arthralgia of multiple articulations. He had no bleeding. On exam alert and conversant. No jaundice and no oral lesions. Respirations not labored. Lungs clear and heart regular. Abdomen protuberant and soft. No edema. Reviewed the laboratory exams. Viral serologies are negative. However the lupus anticoagulant and the anticardiolipin IgM is in the low medium positive. Anticardiolipin syndrome? Discussed with him at length. To see me in 3 months with new laboratory exams.     5/27/2025: Without new complaints.  Active.  Energetic.  Eating well and without weight loss.  No fevers.  Denies skin rash or oral ulcers.  Denies dyspnea and has had no cough.  No abdominal pain, diarrhea or dysuria.  On exam alert and conversant.  Oriented and in no distress.  No jaundice.  The lungs are clear bilaterally and the heart regular.  Abdomen protuberant and soft.  No palpable visceromegaly or tumors.  There is no edema.  Laboratory exams reviewed.  He persists with positive lupus anticoagulant.  He also has anticardiolipin IgM in the indeterminate range at 18 units/mL,  however, it has come down as compared to the previous measurement.  His platelet count is back to normal.  I have asked him to see me in approximately 4 months with new laboratory exams.    Past Medical History:   Diagnosis Date    Arthritis 2018    Bilateral leg cramps     AT     Colon polyps     FOLLOWED BY DR. SERA LE    DDD (degenerative disc disease), lumbar 2018    L3,L4,L5    Diabetes     TYPE 2, NIDDM    Disorder of gallbladder     Diverticulitis 09/2016    SIGMOID    Diverticulitis of colon 04/18/2014    Fatty liver     GERD (gastroesophageal reflux disease)     Hyperlipidemia     Hypertension     Hypertriglyceridemia     Hypogonadism 02/07/2013    Impotence of organic origin 02/22/2012    Irritable bowel syndrome 01/14/2013    Lumbar compression fracture     L4    Lumbar radiculopathy     Muscle spasm of left calf     Numbness and tingling of foot     LEFT FOOT, S/P BACK SURGERY    Obesity     WOLF (obstructive sleep apnea)     cpap    Plantar fasciitis 06/09/2016    Polycythemia 02/22/2012    Post laminectomy syndrome     Right sided abdominal pain 02/28/2021    SEEN AT [facility]    RUQ pain 05/02/2021    SEEN AT [facility]    Spinal stenosis of lumbar region without neurogenic claudication 09/05/2018    Splenomegaly 05/2021    Spondylolisthesis      Past Surgical History:   Procedure Laterality Date    ANAL FISSURECTOMY N/A 04/15/2009    DR.RAYMOND MICHAELS AT [facility]    BACK SURGERY  2018    CHOLECYSTECTOMY  2022    CHOLECYSTECTOMY WITH INTRAOPERATIVE CHOLANGIOGRAM N/A 06/23/2021    Procedure: CHOLECYSTECTOMY LAPAROSCOPIC INTRAOPERATIVE CHOLANGIOGRAM;  Surgeon: Chris Carrion Jr., MD;  [facility];  Service: General;  Laterality: N/A;    COLON SURGERY N/A 09/16/2016    SIGMOID COLECTOMY, D/T DIVERTICULITIS, DR. TYLER DUNBAR AT [facility]    COLONOSCOPY N/A 04/15/2009    MIXED ADENOMATOUS POLYP IN RECTUM, DR. HERLINDA MICHAELS AT [facility]    COLONOSCOPY N/A 01/28/2021    7 MM HYPERPLASTIC POLYP IN RECTUM,  MULTIPLE SMALL AND LARGE DIVERTICULA IN ENTIRE COLON, RESCOPE IN 5 YRS, DR. SERA LE AT [facility]    COLONOSCOPY W/ POLYPECTOMY N/A 12/11/2013    2 TUBULOVILLOUS ADENOMA POLYPS IN CECUM, 8 MM TUBULOVILLOUS ADENOMA POLYP IN ASCENDING, 14 MM TUBULOVILLOUS ADENOMA POLYP IN DISTAL ASCENDING, 5 MM TUBULAR ADENOMA POLYP IN ASCENDING, 5 MM BENIGN POLYP IN RECTUM, RESCOPE IN 2 YRS, DR. SERA LE AT [facility]    ENDOSCOPY AND COLONOSCOPY N/A 2014    HEMORRHOIDECTOMY N/A 04/15/2009    DR.RAYMOND MICHAELS AT [facility]    HERNIA REPAIR Left 09/14/2017    lap left inguinal hernia repair, ventral hernia repair, DR. TYLER DUNBAR AT [facility]    LUMBAR EPIDURAL INJECTION N/A     lumbar    LUMBAR SPINE POSTERIOR N/A 12/03/2018    TLIF L3-L5, DR. MOHAMMAD MAJD AT [facility]     TONSILLECTOMY Bilateral     TRIGGER POINT INJECTION  2020    UPPER GASTROINTESTINAL ENDOSCOPY      VENTRAL HERNIA REPAIR N/A 03/01/2018    LAPAROSCOPIC WITH PREVIOUS MESH REMOVAL, DR. TYLER DUNBAR AT [facility]       Current Outpatient Medications:     acetaminophen (TYLENOL) 500 MG tablet, Take 1 tablet by mouth Every 6 (Six) Hours As Needed., Disp: , Rfl:     albuterol sulfate  (90 Base) MCG/ACT inhaler, Inhale 2 puffs Every 6 (Six) Hours As Needed., Disp: , Rfl:     colestipol (Colestid) 5 g granules, Take 5 g by mouth 2 (Two) Times a Day., Disp: 30 each, Rfl: 1    dilTIAZem CD (CARDIZEM CD) 120 MG 24 hr capsule, Take 1 capsule by mouth Daily., Disp: 30 capsule, Rfl: 11    gabapentin (NEURONTIN) 300 MG capsule, Take 1 capsule by mouth 2 (Two) Times a Day., Disp: 60 capsule, Rfl: 1    lisinopril (PRINIVIL,ZESTRIL) 20 MG tablet, Take 1 tablet by mouth Daily., Disp: 90 tablet, Rfl: 1    metFORMIN (GLUCOPHAGE) 1000 MG tablet, TAKE 1 TABLET BY MOUTH TWICE DAILY WITH MEALS, Disp: 180 tablet, Rfl: 1    multivitamin (THERAGRAN) tablet tablet, Take 1 tablet by mouth Every Night. HOLD FOR SURGERY, Disp: , Rfl:     nebivolol (BYSTOLIC) 5 MG tablet, Take 1 tablet by mouth Daily.,  Disp: , Rfl:     pantoprazole (PROTONIX) 40 MG EC tablet, Take 1 tablet by mouth Daily., Disp: 90 tablet, Rfl: 1    rosuvastatin (CRESTOR) 5 MG tablet, Take 1 tablet by mouth Every Night., Disp: 90 tablet, Rfl: 1    saccharomyces boulardii (Florastor) 250 MG capsule, Take 1 capsule by mouth 2 (Two) Times a Day., Disp: 30 capsule, Rfl: 2    Semaglutide (Rybelsus) 14 MG tablet, Take 1 tablet by mouth Every Morning Before Breakfast., Disp: 30 tablet, Rfl: 11    Allergies   Allergen Reactions    Penicillin G Anaphylaxis     Family History   Problem Relation Age of Onset    Colon cancer Father 74    Arthritis Paternal Grandfather     Hyperlipidemia Paternal Grandfather     Malig Hyperthermia Neg Hx     Thyroid cancer Neg Hx     Liver disease Neg Hx      Cancer-related family history includes Colon cancer (age of onset: 74) in his father. There is no history of Thyroid cancer.    Social History     Tobacco Use    Smoking status: Former     Current packs/day: 0.00     Average packs/day: 0.5 packs/day for 10.0 years (5.0 ttl pk-yrs)     Types: Cigarettes     Start date: 2002     Quit date: 2012     Years since quittin.4     Passive exposure: Past    Smokeless tobacco: Current     Types: Chew   Vaping Use    Vaping status: Never Used   Substance Use Topics    Alcohol use: Not Currently    Drug use: Never     Social History     Social History Narrative     2nd marriage ()     ROS:   Review of Systems   Constitutional:  Negative for activity change, appetite change, chills, diaphoresis, fatigue, fever and unexpected weight change.   HENT:  Negative for congestion, dental problem, drooling, ear discharge, ear pain, facial swelling, hearing loss, mouth sores, nosebleeds, postnasal drip, rhinorrhea, sinus pressure, sinus pain, sneezing, sore throat, tinnitus, trouble swallowing and voice change.    Eyes:  Negative for photophobia, pain, discharge, redness, itching and visual disturbance.   Respiratory:   "Negative for apnea, cough, choking, chest tightness, shortness of breath, wheezing and stridor.    Cardiovascular:  Negative for chest pain, palpitations and leg swelling.   Gastrointestinal:  Negative for abdominal distention, abdominal pain, anal bleeding, blood in stool, constipation, diarrhea, nausea, rectal pain and vomiting.   Endocrine: Negative for cold intolerance, heat intolerance, polydipsia and polyuria.   Genitourinary:  Negative for decreased urine volume, difficulty urinating, dysuria, flank pain, frequency, genital sores, hematuria and urgency.   Musculoskeletal:  Negative for arthralgias, back pain, gait problem, joint swelling, myalgias, neck pain and neck stiffness.   Skin:  Negative for color change, pallor and rash.   Neurological:  Negative for dizziness, tremors, seizures, syncope, facial asymmetry, speech difficulty, weakness, light-headedness, numbness and headaches.   Hematological:  Negative for adenopathy. Does not bruise/bleed easily.   Psychiatric/Behavioral:  Negative for agitation, behavioral problems, confusion, decreased concentration, hallucinations, self-injury, sleep disturbance and suicidal ideas. The patient is not nervous/anxious.      Objective:    Vital Signs:  Vitals:    05/27/25 1050   Pulse: 53   Resp: 15   SpO2: 99%   Weight: 131 kg (288 lb)   Height: 180.3 cm (71\")   PainSc: 0-No pain     Body mass index is 40.17 kg/m².    ECOG  (1) Restricted in physically strenuous activity, ambulatory and able to do work of light nature    Physical Exam:   Physical Exam  Constitutional:       General: He is not in acute distress.     Appearance: He is ill-appearing. He is not toxic-appearing or diaphoretic.   HENT:      Head: Normocephalic and atraumatic.      Right Ear: External ear normal.      Left Ear: External ear normal.      Nose: Nose normal.      Mouth/Throat:      Mouth: Mucous membranes are moist.      Pharynx: Oropharynx is clear.   Eyes:      General: No scleral icterus. "        Right eye: No discharge.         Left eye: No discharge.      Conjunctiva/sclera: Conjunctivae normal.      Pupils: Pupils are equal, round, and reactive to light.   Cardiovascular:      Rate and Rhythm: Normal rate and regular rhythm.      Pulses: Normal pulses.      Heart sounds: Normal heart sounds. No murmur heard.     No friction rub. No gallop.   Pulmonary:      Effort: No respiratory distress.      Breath sounds: No stridor. No wheezing, rhonchi or rales.   Chest:      Chest wall: No tenderness.   Abdominal:      General: Bowel sounds are normal. There is no distension.      Palpations: Abdomen is soft. There is no mass.      Tenderness: There is no abdominal tenderness. There is no right CVA tenderness, left CVA tenderness, guarding or rebound.      Comments: Protuberant, soft.  The liver can be palpated approximately 3 cm below the costal margin.  The spleen seems palpable approximately 5 cm below the costal margin.   Musculoskeletal:         General: No swelling, tenderness, deformity or signs of injury.      Cervical back: No rigidity.      Right lower leg: No edema.      Left lower leg: No edema.   Lymphadenopathy:      Cervical: No cervical adenopathy.   Skin:     General: Skin is warm and dry.      Coloration: Skin is not jaundiced or pale.      Findings: No bruising or rash.   Neurological:      General: No focal deficit present.      Mental Status: He is alert and oriented to person, place, and time.      Cranial Nerves: No cranial nerve deficit.   Psychiatric:         Mood and Affect: Mood normal.         Behavior: Behavior normal.         Thought Content: Thought content normal.         Judgment: Judgment normal.     FELICITAS Ortiz MD performed the physical exam on 5/27/2025 as documented above.    Lab Results - Last 18 Months   Lab Units 05/06/25  1124 04/29/25  0940 03/05/25  1142   WBC x10E3/uL 8.9 6.87 7.3   HEMOGLOBIN g/dL 15.9 15.8 15.2   HEMATOCRIT % 46.8 46.3 44.9   PLATELETS  x10E3/uL 156 141 166   MCV fL 93 91.0 92     Lab Results - Last 18 Months   Lab Units 05/06/25  1124 04/29/25  0940 01/27/25  1023 01/23/25  1238   SODIUM mmol/L 139 138  --  139   POTASSIUM mmol/L 4.4 4.4  --  4.3   CHLORIDE mmol/L 103 104  --  101   CO2 mmol/L 23 23.7  --  28.6   BUN mg/dL 13 15  --  16   CREATININE mg/dL 0.83 0.79 0.80 0.92   CALCIUM mg/dL 9.6 9.7  --  9.9   BILIRUBIN mg/dL 0.7 0.6  --  1.2   ALK PHOS IU/L 70 67  --  63   ALT (SGPT) IU/L 31 26  --  19   AST (SGOT) IU/L 31 34  --  29   GLUCOSE mg/dL 72 87  --  82     Lab Results   Component Value Date    GLUCOSE 72 05/06/2025    BUN 13 05/06/2025    CREATININE 0.83 05/06/2025    EGFRIFNONA 101 08/31/2021    EGFRIFAFRI >150 05/04/2021    BCR 16 05/06/2025    K 4.4 05/06/2025    CO2 23 05/06/2025    CALCIUM 9.6 05/06/2025    ALBUMIN 4.4 05/06/2025    AST 31 05/06/2025    ALT 31 05/06/2025     Lab Results - Last 18 Months   Lab Units 12/24/24  1527   INR  1.11*   APTT seconds 35.5*     Lab Results   Component Value Date    IRON 128 05/02/2023    FERRITIN 138 05/02/2023     LDH   Date Value Ref Range Status   05/04/2021 144 135 - 225 U/L Final     Uric Acid   Date Value Ref Range Status   05/04/2021 7.0 3.4 - 7.0 mg/dL Final     Lab Results   Component Value Date    APARNA Negative 04/29/2025    SEDRATE 46 (H) 05/04/2021     Lab Results   Component Value Date    PTT 35.5 (H) 12/24/2024    INR 1.11 (H) 12/24/2024     Lab Results   Component Value Date    PSA 0.385 04/29/2025     Lab Results   Component Value Date    SEDRATE 46 (H) 05/04/2021      Assessment & Plan     1.  Antiphospholipid syndrome?:  Positive.  Lupus anticoagulant is positive and he has an anticardiolipin IgM in the indeterminate range.  Will continue to follow.  At this time no need for intervention.  2.  Chronic liver disease with portal hypertension and splenomegaly: The ultrasound confirms splenomegaly. I believe he has cirrhosis and I have referred him to hepatology.   3.  Class II  obesity with a body mass index of 38 kg/m².  4.  Reviewed laboratory exams including chemistries, blood counts and immunologic studies.  6.  4 months with new laboratory exams.    Yemi Ortiz MD on 5/27/2025 at 11:22.

## 2025-05-27 ENCOUNTER — OFFICE VISIT (OUTPATIENT)
Dept: ONCOLOGY | Facility: CLINIC | Age: 55
End: 2025-05-27
Payer: COMMERCIAL

## 2025-05-27 VITALS
RESPIRATION RATE: 15 BRPM | BODY MASS INDEX: 40.32 KG/M2 | HEART RATE: 53 BPM | HEIGHT: 71 IN | OXYGEN SATURATION: 99 % | WEIGHT: 288 LBS

## 2025-05-27 DIAGNOSIS — D75.1 POLYCYTHEMIA: ICD-10-CM

## 2025-05-27 DIAGNOSIS — D69.6 THROMBOCYTOPENIA: Primary | ICD-10-CM

## 2025-05-27 PROCEDURE — 99213 OFFICE O/P EST LOW 20 MIN: CPT | Performed by: INTERNAL MEDICINE

## 2025-06-13 NOTE — PROGRESS NOTES
"Chief Complaint  Hypertension, Hyperlipidemia, Diabetes, and Night Sweats    Subjective          Qasim Ch presents to [facility] for     HPI    Hypertension  Patient does not check blood pressure at home.   Patient denies  blurred vision, chest pain, dyspnea, headache, palpitations, and peripheral edema   Patient reports they are taking medications as prescribed and they are not having side effects.    Hyperlipidemia  Patient is following a low cholesterol diet.   Currently is on statin therapy.  Patient reports is exercising.  Patient reports they are taking medications as prescribed and they are not having side effects.    Diabetes  Patient does check blood sugar at home has CGM  Per patient fasting blood sugars range between 80's to 110.  Per patient current diet is Well Balanced.  Patient does avoid concentrated sweets.  Patient does not see podiatry.  Patient see's Dr. Enriquez's for diabetic eye exam and last eye exam was within the last year.  Patient reports they are taking medications as prescribed and they are not having side effects.  Last A1c was 5.5 on 02/11/2025.     He is endorsing some night sweats and fatigue for the past 3-4 weeks. He thought it was related to his sugars and his partner, who is a vet, attached a BG monitor to him and it is fluctuating within the normal range overnight typically . He does endorse some ear pain as well as worsening congestion during this time.    Appointment on 05/19/2025 with Dr. Gonzalez and appointment on 05/27/2025 with Dr. Ortiz    Objective   Vital Signs:   /82 (BP Location: Left arm, Patient Position: Sitting, Cuff Size: Adult)   Pulse 52   Temp 98.2 °F (36.8 °C) (Temporal)   Resp 18   Ht 180.3 cm (71\")   Wt 128 kg (283 lb 4 oz)   SpO2 96% Comment: room air  BMI 39.51 kg/m²     Physical Exam  Vitals and nursing note reviewed.   Constitutional:       General: He is not in acute distress.     Appearance: " Normal appearance. He is not ill-appearing or diaphoretic.   HENT:      Head: Normocephalic and atraumatic.      Right Ear: Ear canal and external ear normal. There is impacted cerumen. Tympanic membrane is erythematous.      Left Ear: Ear canal and external ear normal. There is impacted cerumen. Tympanic membrane is erythematous.      Nose: Congestion and rhinorrhea present.   Eyes:      Extraocular Movements: Extraocular movements intact.      Pupils: Pupils are equal, round, and reactive to light.   Cardiovascular:      Rate and Rhythm: Normal rate and regular rhythm.      Pulses: Normal pulses.   Pulmonary:      Effort: Pulmonary effort is normal.      Breath sounds: Normal breath sounds.   Musculoskeletal:         General: Normal range of motion.      Cervical back: Normal range of motion.   Skin:     General: Skin is warm and dry.   Neurological:      Mental Status: He is alert and oriented to person, place, and time.   Psychiatric:         Mood and Affect: Mood normal.         Behavior: Behavior normal.        Result Review :          Ear Cerumen Removal    Date/Time: 6/17/2025 9:47 AM    Performed by: Rohith Chan MD  Authorized by: Rohith Chan MD    Anesthesia:  Local Anesthetic: none  Patient tolerance: patient tolerated the procedure well with no immediate complications  Procedure type: irrigation           Assessment and Plan    Diagnoses and all orders for this visit:    1. Type 2 diabetes mellitus without complication, without long-term current use of insulin (Primary)  Overview:  Regimen: Rybelsus 14 mg qd, Metformin 1000 mg bid.  Is on ACEi and statin.    A1c 5.3 06/17/2025  A1c 5.5 02/11/2025    Assessment & Plan:  A1c remains excellent.  Continue positive lifestyle changes and regimen as above.    Orders:  -     POC Glycosylated Hemoglobin (Hb A1C)    2. Bilateral impacted cerumen  Comments:  Irrigated in the office today.  Orders:  -     Ear Cerumen Removal    3. Bilateral acute  otitis media  Comments:  Identified following ear irrigation.  Orders:  -     doxycycline (VIBRAMYCIN) 100 MG capsule; Take 1 capsule by mouth 2 (Two) Times a Day for 5 days.  Dispense: 10 capsule; Refill: 0    4. Night sweats  Comments:  Treat AOM/sinusitis with doxy. Labs as below. He is following with hem/onc, saw them 5/27. Hx of borderline elevated TSH, recheck thyroid.  Orders:  -     TSH Rfx On Abnormal To Free T4  -     CBC & Differential  -     Comprehensive Metabolic Panel    5. Mixed hyperlipidemia  Overview:  Lab Results   Component Value Date    CHOL 133 01/04/2022    CHLPL 74 (L) 02/04/2025    TRIG 99 02/04/2025    HDL 26 (L) 02/04/2025    LDL 29 02/04/2025       Assessment & Plan:  Cholesterol is arguably too low.   Repeat lipid panel.  Consider lower intensity statin if total cholesterol and LDL remain low.    Orders:  -     Lipid Panel    6. Lumbar radiculopathy  Overview:  Chronic low back pain with left sciatica.  Chronic left hip OA, will be having hip replacement in the near future.  Pain controlled with gabapentin.    Orders:  -     gabapentin (NEURONTIN) 300 MG capsule; Take 1 capsule by mouth 2 (Two) Times a Day.  Dispense: 60 capsule; Refill: 1    7. Primary hypertension  Overview:  Regimen: Cardiazem 120 mg qd (for PVCs), lisinopril 20 mg qd.    Assessment & Plan:  BP remains borderline.  Encouraged to monitor and record at home to ensure <140/90.             Rohith Chan MD    NOTE: [facility], per Health Information accessibility laws, allows progress notes to be visible to patients. Please note that these notes will include professional medical terminology that may be somewhat confusing without some interpretation from your medical professional team. The intent of progress notes is to communicate information between any medical professionals involved in your case, or to serve as future reference for myself or any other provider when reviewing your case, as well as a reference for the  patient viewing the record. Please ask a member of the medical team if you have any questions about terminology or content of note.    DISCLAIMER: Part of this note may be an electronic transcription/translation of spoken language to printed text using the Dragon Dictation System.

## 2025-06-16 ENCOUNTER — TELEPHONE (OUTPATIENT)
Dept: ONCOLOGY | Facility: CLINIC | Age: 55
End: 2025-06-16

## 2025-06-16 NOTE — TELEPHONE ENCOUNTER
"    Hub staff attempted to follow warm transfer process and was unsuccessful     Caller: Qasim Ch \"FLORY\"    Relationship to patient: Self    Best call back number: [phone]     Patient is needing: PT IS AT [facility] NOW TO GET LABS DONE FOR DR SEALS. THERE ARE 12 DIFFERENT LAB ORDERS IN FOR THE PT AND LAB [facility] IS NOT SURE WHICH ONES TO DO FOR THE PT AND NEEDS CLARIFICATION.     CALL BACK ASAP.           "

## 2025-06-17 ENCOUNTER — OFFICE VISIT (OUTPATIENT)
Dept: FAMILY MEDICINE CLINIC | Facility: CLINIC | Age: 55
End: 2025-06-17
Payer: COMMERCIAL

## 2025-06-17 VITALS
DIASTOLIC BLOOD PRESSURE: 82 MMHG | WEIGHT: 283.25 LBS | SYSTOLIC BLOOD PRESSURE: 140 MMHG | TEMPERATURE: 98.2 F | RESPIRATION RATE: 18 BRPM | OXYGEN SATURATION: 96 % | HEIGHT: 71 IN | HEART RATE: 52 BPM | BODY MASS INDEX: 39.65 KG/M2

## 2025-06-17 DIAGNOSIS — M54.16 LUMBAR RADICULOPATHY: ICD-10-CM

## 2025-06-17 DIAGNOSIS — R61 NIGHT SWEATS: ICD-10-CM

## 2025-06-17 DIAGNOSIS — I10 PRIMARY HYPERTENSION: Chronic | ICD-10-CM

## 2025-06-17 DIAGNOSIS — E11.9 TYPE 2 DIABETES MELLITUS WITHOUT COMPLICATION, WITHOUT LONG-TERM CURRENT USE OF INSULIN: Primary | ICD-10-CM

## 2025-06-17 DIAGNOSIS — E78.2 MIXED HYPERLIPIDEMIA: ICD-10-CM

## 2025-06-17 DIAGNOSIS — H61.23 BILATERAL IMPACTED CERUMEN: ICD-10-CM

## 2025-06-17 DIAGNOSIS — H66.93 BILATERAL ACUTE OTITIS MEDIA: ICD-10-CM

## 2025-06-17 LAB
EXPIRATION DATE: NORMAL
HBA1C MFR BLD: 5.3 % (ref 4.5–5.7)
Lab: NORMAL

## 2025-06-17 RX ORDER — DOXYCYCLINE 100 MG/1
100 CAPSULE ORAL 2 TIMES DAILY
Qty: 10 CAPSULE | Refills: 0 | Status: SHIPPED | OUTPATIENT
Start: 2025-06-17 | End: 2025-06-22

## 2025-06-17 RX ORDER — GABAPENTIN 300 MG/1
300 CAPSULE ORAL 2 TIMES DAILY
Qty: 60 CAPSULE | Refills: 1 | Status: SHIPPED | OUTPATIENT
Start: 2025-06-17

## 2025-06-17 NOTE — ASSESSMENT & PLAN NOTE
BP remains borderline.  Encouraged to monitor and record at home to ensure <140/90.   Pt was with radiology tech having bedside ultrasound, then he will go to cath lab for pacemaker. Will FU for OT re-eval s/p pacemaker placement.

## 2025-06-17 NOTE — ASSESSMENT & PLAN NOTE
Cholesterol is arguably too low.   Repeat lipid panel.  Consider lower intensity statin if total cholesterol and LDL remain low.

## 2025-06-18 LAB
ALBUMIN SERPL-MCNC: 4.4 G/DL (ref 3.8–4.9)
ALP SERPL-CCNC: 65 IU/L (ref 44–121)
ALT SERPL-CCNC: 29 IU/L (ref 0–44)
AST SERPL-CCNC: 32 IU/L (ref 0–40)
BASOPHILS # BLD AUTO: 0 X10E3/UL (ref 0–0.2)
BASOPHILS NFR BLD AUTO: 1 %
BILIRUB SERPL-MCNC: 0.5 MG/DL (ref 0–1.2)
BUN SERPL-MCNC: 20 MG/DL (ref 6–24)
BUN/CREAT SERPL: 25 (ref 9–20)
CALCIUM SERPL-MCNC: 9.6 MG/DL (ref 8.7–10.2)
CHLORIDE SERPL-SCNC: 103 MMOL/L (ref 96–106)
CHOLEST SERPL-MCNC: 119 MG/DL (ref 100–199)
CO2 SERPL-SCNC: 21 MMOL/L (ref 20–29)
CREAT SERPL-MCNC: 0.79 MG/DL (ref 0.76–1.27)
EGFRCR SERPLBLD CKD-EPI 2021: 106 ML/MIN/1.73
EOSINOPHIL # BLD AUTO: 0.1 X10E3/UL (ref 0–0.4)
EOSINOPHIL NFR BLD AUTO: 2 %
ERYTHROCYTE [DISTWIDTH] IN BLOOD BY AUTOMATED COUNT: 13.4 % (ref 11.6–15.4)
GLOBULIN SER CALC-MCNC: 2.7 G/DL (ref 1.5–4.5)
GLUCOSE SERPL-MCNC: 97 MG/DL (ref 70–99)
HCT VFR BLD AUTO: 47.2 % (ref 37.5–51)
HDLC SERPL-MCNC: 28 MG/DL
HGB BLD-MCNC: 15 G/DL (ref 13–17.7)
IMM GRANULOCYTES # BLD AUTO: 0 X10E3/UL (ref 0–0.1)
IMM GRANULOCYTES NFR BLD AUTO: 0 %
LDLC SERPL CALC-MCNC: 49 MG/DL (ref 0–99)
LYMPHOCYTES # BLD AUTO: 1.5 X10E3/UL (ref 0.7–3.1)
LYMPHOCYTES NFR BLD AUTO: 25 %
MCH RBC QN AUTO: 31.2 PG (ref 26.6–33)
MCHC RBC AUTO-ENTMCNC: 31.8 G/DL (ref 31.5–35.7)
MCV RBC AUTO: 98 FL (ref 79–97)
MONOCYTES # BLD AUTO: 0.7 X10E3/UL (ref 0.1–0.9)
MONOCYTES NFR BLD AUTO: 11 %
NEUTROPHILS # BLD AUTO: 3.8 X10E3/UL (ref 1.4–7)
NEUTROPHILS NFR BLD AUTO: 61 %
PLATELET # BLD AUTO: 122 X10E3/UL (ref 150–450)
POTASSIUM SERPL-SCNC: 4.3 MMOL/L (ref 3.5–5.2)
PROT SERPL-MCNC: 7.1 G/DL (ref 6–8.5)
RBC # BLD AUTO: 4.81 X10E6/UL (ref 4.14–5.8)
SODIUM SERPL-SCNC: 139 MMOL/L (ref 134–144)
T4 FREE SERPL-MCNC: 1.33 NG/DL (ref 0.82–1.77)
TRIGL SERPL-MCNC: 272 MG/DL (ref 0–149)
TSH SERPL DL<=0.005 MIU/L-ACNC: 0.32 UIU/ML (ref 0.45–4.5)
VLDLC SERPL CALC-MCNC: 42 MG/DL (ref 5–40)
WBC # BLD AUTO: 6.1 X10E3/UL (ref 3.4–10.8)

## 2025-06-25 DIAGNOSIS — I10 PRIMARY HYPERTENSION: Chronic | ICD-10-CM

## 2025-06-26 RX ORDER — NEBIVOLOL 5 MG/1
5 TABLET ORAL DAILY
Qty: 90 TABLET | Refills: 1 | Status: SHIPPED | OUTPATIENT
Start: 2025-06-26

## 2025-06-30 DIAGNOSIS — E11.9 TYPE 2 DIABETES MELLITUS WITHOUT COMPLICATION, WITHOUT LONG-TERM CURRENT USE OF INSULIN: ICD-10-CM

## 2025-06-30 DIAGNOSIS — I10 PRIMARY HYPERTENSION: ICD-10-CM

## 2025-07-01 RX ORDER — LISINOPRIL 20 MG/1
20 TABLET ORAL DAILY
Qty: 90 TABLET | Refills: 1 | Status: SHIPPED | OUTPATIENT
Start: 2025-07-01

## 2025-08-17 ENCOUNTER — APPOINTMENT (OUTPATIENT)
Dept: CT IMAGING | Facility: HOSPITAL | Age: 55
End: 2025-08-17
Payer: COMMERCIAL

## 2025-08-17 ENCOUNTER — HOSPITAL ENCOUNTER (EMERGENCY)
Facility: HOSPITAL | Age: 55
Discharge: HOME | End: 2025-08-18
Attending: EMERGENCY MEDICINE | Admitting: EMERGENCY MEDICINE
Payer: COMMERCIAL

## 2025-08-17 DIAGNOSIS — S42.002A CLOSED NONDISPLACED FRACTURE OF LEFT CLAVICLE, UNSPECIFIED PART OF CLAVICLE, INITIAL ENCOUNTER: ICD-10-CM
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
## 2025-08-17 DIAGNOSIS — S22.32XA CLOSED FRACTURE OF ONE RIB OF LEFT SIDE, INITIAL ENCOUNTER: ICD-10-CM

## 2025-08-17 LAB
ALBUMIN SERPL-MCNC: 4.6 G/DL (ref 3.5–5.2)
ALBUMIN/GLOB SERPL: 1.6 G/DL
ALP SERPL-CCNC: 54 U/L (ref 39–117)
ALT SERPL W P-5'-P-CCNC: 46 U/L (ref 1–41)
ANION GAP SERPL CALCULATED.3IONS-SCNC: 14.6 MMOL/L (ref 5–15)
AST SERPL-CCNC: 49 U/L (ref 1–40)
BASOPHILS # BLD AUTO: 0.05 10*3/MM3 (ref 0–0.2)
BASOPHILS NFR BLD AUTO: 0.5 % (ref 0–1.5)
BILIRUB SERPL-MCNC: 0.7 MG/DL (ref 0–1.2)
BUN SERPL-MCNC: 15.8 MG/DL (ref 6–20)
BUN/CREAT SERPL: 16.6 (ref 7–25)
CALCIUM SPEC-SCNC: 9.6 MG/DL (ref 8.6–10.5)
CHLORIDE SERPL-SCNC: 105 MMOL/L (ref 98–107)
CO2 SERPL-SCNC: 20.4 MMOL/L (ref 22–29)
CREAT SERPL-MCNC: 0.95 MG/DL (ref 0.76–1.27)
DEPRECATED RDW RBC AUTO: 43 FL (ref 37–54)
EGFRCR SERPLBLD CKD-EPI 2021: 95.7 ML/MIN/1.73
EOSINOPHIL # BLD AUTO: 0.08 10*3/MM3 (ref 0–0.4)
EOSINOPHIL NFR BLD AUTO: 0.9 % (ref 0.3–6.2)
ERYTHROCYTE [DISTWIDTH] IN BLOOD BY AUTOMATED COUNT: 13 % (ref 12.3–15.4)
GLOBULIN UR ELPH-MCNC: 2.8 GM/DL
GLUCOSE SERPL-MCNC: 151 MG/DL (ref 65–99)
HCT VFR BLD AUTO: 44.2 % (ref 37.5–51)
HGB BLD-MCNC: 15.3 G/DL (ref 13–17.7)
IMM GRANULOCYTES # BLD AUTO: 0.05 10*3/MM3 (ref 0–0.05)
IMM GRANULOCYTES NFR BLD AUTO: 0.5 % (ref 0–0.5)
LIPASE SERPL-CCNC: 34 U/L (ref 13–60)
LYMPHOCYTES # BLD AUTO: 1.77 10*3/MM3 (ref 0.7–3.1)
LYMPHOCYTES NFR BLD AUTO: 19.4 % (ref 19.6–45.3)
MCH RBC QN AUTO: 31.4 PG (ref 26.6–33)
MCHC RBC AUTO-ENTMCNC: 34.6 G/DL (ref 31.5–35.7)
MCV RBC AUTO: 90.8 FL (ref 79–97)
MONOCYTES # BLD AUTO: 0.81 10*3/MM3 (ref 0.1–0.9)
MONOCYTES NFR BLD AUTO: 8.9 % (ref 5–12)
NEUTROPHILS NFR BLD AUTO: 6.35 10*3/MM3 (ref 1.7–7)
NEUTROPHILS NFR BLD AUTO: 69.8 % (ref 42.7–76)
NRBC BLD AUTO-RTO: 0 /100 WBC (ref 0–0.2)
PLATELET # BLD AUTO: 152 10*3/MM3 (ref 140–450)
PMV BLD AUTO: 10.7 FL (ref 6–12)
POTASSIUM SERPL-SCNC: 4.4 MMOL/L (ref 3.5–5.2)
PROT SERPL-MCNC: 7.4 G/DL (ref 6–8.5)
RBC # BLD AUTO: 4.87 10*6/MM3 (ref 4.14–5.8)
SODIUM SERPL-SCNC: 140 MMOL/L (ref 136–145)
WBC NRBC COR # BLD AUTO: 9.11 10*3/MM3 (ref 3.4–10.8)

## 2025-08-17 PROCEDURE — 96374 THER/PROPH/DIAG INJ IV PUSH: CPT

## 2025-08-17 PROCEDURE — 74177 CT ABD & PELVIS W/CONTRAST: CPT

## 2025-08-17 PROCEDURE — 71260 CT THORAX DX C+: CPT

## 2025-08-17 PROCEDURE — 93005 ELECTROCARDIOGRAM TRACING: CPT | Performed by: EMERGENCY MEDICINE

## 2025-08-17 PROCEDURE — 70450 CT HEAD/BRAIN W/O DYE: CPT

## 2025-08-17 PROCEDURE — 96376 TX/PRO/DX INJ SAME DRUG ADON: CPT

## 2025-08-17 PROCEDURE — 96375 TX/PRO/DX INJ NEW DRUG ADDON: CPT

## 2025-08-17 PROCEDURE — 80053 COMPREHEN METABOLIC PANEL: CPT | Performed by: EMERGENCY MEDICINE

## 2025-08-17 PROCEDURE — 25010000002 HYDROMORPHONE 1 MG/ML SOLUTION: Performed by: EMERGENCY MEDICINE

## 2025-08-17 PROCEDURE — 25010000002 ONDANSETRON PER 1 MG: Performed by: EMERGENCY MEDICINE

## 2025-08-17 PROCEDURE — 83690 ASSAY OF LIPASE: CPT | Performed by: EMERGENCY MEDICINE

## 2025-08-17 PROCEDURE — 72125 CT NECK SPINE W/O DYE: CPT

## 2025-08-17 PROCEDURE — 99285 EMERGENCY DEPT VISIT HI MDM: CPT

## 2025-08-17 PROCEDURE — 85025 COMPLETE CBC W/AUTO DIFF WBC: CPT | Performed by: EMERGENCY MEDICINE

## 2025-08-17 RX ORDER — ONDANSETRON 2 MG/ML
4 INJECTION INTRAMUSCULAR; INTRAVENOUS ONCE
Status: COMPLETED | OUTPATIENT
Start: 2025-08-17 | End: 2025-08-17

## 2025-08-17 RX ORDER — SODIUM CHLORIDE 0.9 % (FLUSH) 0.9 %
10 SYRINGE (ML) INJECTION AS NEEDED
Status: DISCONTINUED | OUTPATIENT
Start: 2025-08-17 | End: 2025-08-18 | Stop reason: HOSPADM

## 2025-08-17 RX ADMIN — HYDROMORPHONE HYDROCHLORIDE 1 MG: 1 INJECTION, SOLUTION INTRAMUSCULAR; INTRAVENOUS; SUBCUTANEOUS at 22:19

## 2025-08-17 RX ADMIN — HYDROMORPHONE HYDROCHLORIDE 1 MG: 1 INJECTION, SOLUTION INTRAMUSCULAR; INTRAVENOUS; SUBCUTANEOUS at 23:30

## 2025-08-17 RX ADMIN — ONDANSETRON 4 MG: 2 INJECTION INTRAMUSCULAR; INTRAVENOUS at 22:18

## 2025-08-18 VITALS
DIASTOLIC BLOOD PRESSURE: 86 MMHG | RESPIRATION RATE: 17 BRPM | WEIGHT: 286.38 LBS | BODY MASS INDEX: 40.09 KG/M2 | HEIGHT: 71 IN | HEART RATE: 88 BPM | SYSTOLIC BLOOD PRESSURE: 134 MMHG | TEMPERATURE: 98 F | OXYGEN SATURATION: 95 %

## 2025-08-18 LAB
QT INTERVAL: 394 MS
QTC INTERVAL: 454 MS

## 2025-08-18 PROCEDURE — 96376 TX/PRO/DX INJ SAME DRUG ADON: CPT

## 2025-08-18 PROCEDURE — 25010000002 HYDROMORPHONE 1 MG/ML SOLUTION

## 2025-08-18 PROCEDURE — 25510000001 IOPAMIDOL PER 1 ML: Performed by: EMERGENCY MEDICINE

## 2025-08-18 RX ORDER — HYDROCODONE BITARTRATE AND ACETAMINOPHEN 7.5; 325 MG/1; MG/1
1 TABLET ORAL EVERY 6 HOURS PRN
Qty: 15 TABLET | Refills: 0 | Status: SHIPPED | OUTPATIENT
Start: 2025-08-18

## 2025-08-18 RX ORDER — IOPAMIDOL 755 MG/ML
100 INJECTION, SOLUTION INTRAVASCULAR
Status: COMPLETED | OUTPATIENT
Start: 2025-08-18 | End: 2025-08-18

## 2025-08-18 RX ADMIN — IOPAMIDOL 100 ML: 755 INJECTION, SOLUTION INTRAVENOUS at 00:15

## 2025-08-18 RX ADMIN — HYDROMORPHONE HYDROCHLORIDE 1 MG: 1 INJECTION, SOLUTION INTRAMUSCULAR; INTRAVENOUS; SUBCUTANEOUS at 01:28

## 2025-08-20 ENCOUNTER — OFFICE VISIT (OUTPATIENT)
Dept: FAMILY MEDICINE CLINIC | Facility: CLINIC | Age: 55
End: 2025-08-20
Payer: COMMERCIAL

## 2025-08-20 VITALS
BODY MASS INDEX: 41.48 KG/M2 | HEART RATE: 83 BPM | HEIGHT: 71 IN | TEMPERATURE: 97.7 F | WEIGHT: 296.25 LBS | DIASTOLIC BLOOD PRESSURE: 68 MMHG | OXYGEN SATURATION: 97 % | SYSTOLIC BLOOD PRESSURE: 132 MMHG | RESPIRATION RATE: 18 BRPM

## 2025-08-20 DIAGNOSIS — Z41.9 SURGERY, ELECTIVE: ICD-10-CM

## 2025-08-20 DIAGNOSIS — E11.9 TYPE 2 DIABETES MELLITUS WITHOUT COMPLICATION, WITHOUT LONG-TERM CURRENT USE OF INSULIN: Chronic | ICD-10-CM

## 2025-08-20 DIAGNOSIS — M54.16 LUMBAR RADICULOPATHY: Primary | ICD-10-CM

## 2025-08-20 RX ORDER — GABAPENTIN 300 MG/1
600 CAPSULE ORAL 3 TIMES DAILY
Qty: 180 CAPSULE | Refills: 1 | Status: SHIPPED | OUTPATIENT
Start: 2025-08-20

## 2025-08-20 RX ORDER — CYCLOBENZAPRINE HCL 10 MG
10 TABLET ORAL 2 TIMES DAILY PRN
Qty: 30 TABLET | Refills: 0 | Status: SHIPPED | OUTPATIENT
Start: 2025-08-20

## 2025-08-21 LAB
BASOPHILS # BLD AUTO: 0 X10E3/UL (ref 0–0.2)
BASOPHILS NFR BLD AUTO: 1 %
BUN SERPL-MCNC: 19 MG/DL (ref 6–24)
BUN/CREAT SERPL: 22 (ref 9–20)
CALCIUM SERPL-MCNC: 9.3 MG/DL (ref 8.7–10.2)
CHLORIDE SERPL-SCNC: 102 MMOL/L (ref 96–106)
CO2 SERPL-SCNC: 19 MMOL/L (ref 20–29)
CREAT SERPL-MCNC: 0.85 MG/DL (ref 0.76–1.27)
EGFRCR SERPLBLD CKD-EPI 2021: 104 ML/MIN/1.73
EOSINOPHIL # BLD AUTO: 0.1 X10E3/UL (ref 0–0.4)
EOSINOPHIL NFR BLD AUTO: 1 %
ERYTHROCYTE [DISTWIDTH] IN BLOOD BY AUTOMATED COUNT: 13.1 % (ref 11.6–15.4)
FRUCTOSAMINE SERPL-SCNC: 213 UMOL/L (ref 0–285)
GLUCOSE SERPL-MCNC: 113 MG/DL (ref 70–99)
HBA1C MFR BLD: 5.4 % (ref 4.8–5.6)
HCT VFR BLD AUTO: 41.1 % (ref 37.5–51)
HGB BLD-MCNC: 14.4 G/DL (ref 13–17.7)
IMM GRANULOCYTES # BLD AUTO: 0 X10E3/UL (ref 0–0.1)
IMM GRANULOCYTES NFR BLD AUTO: 0 %
LYMPHOCYTES # BLD AUTO: 1.3 X10E3/UL (ref 0.7–3.1)
LYMPHOCYTES NFR BLD AUTO: 20 %
MCH RBC QN AUTO: 33.1 PG (ref 26.6–33)
MCHC RBC AUTO-ENTMCNC: 35 G/DL (ref 31.5–35.7)
MCV RBC AUTO: 95 FL (ref 79–97)
MONOCYTES # BLD AUTO: 0.7 X10E3/UL (ref 0.1–0.9)
MONOCYTES NFR BLD AUTO: 11 %
NEUTROPHILS # BLD AUTO: 4.3 X10E3/UL (ref 1.4–7)
NEUTROPHILS NFR BLD AUTO: 67 %
PLATELET # BLD AUTO: 114 X10E3/UL (ref 150–450)
POTASSIUM SERPL-SCNC: 4.2 MMOL/L (ref 3.5–5.2)
RBC # BLD AUTO: 4.35 X10E6/UL (ref 4.14–5.8)
SODIUM SERPL-SCNC: 138 MMOL/L (ref 134–144)
WBC # BLD AUTO: 6.4 X10E3/UL (ref 3.4–10.8)

## 2025-08-27 ENCOUNTER — TELEPHONE (OUTPATIENT)
Dept: FAMILY MEDICINE CLINIC | Facility: CLINIC | Age: 55
End: 2025-08-27
Payer: COMMERCIAL

## 2025-08-27 DIAGNOSIS — E78.1 PRIMARY HYPERTRIGLYCERIDEMIA: ICD-10-CM

## 2025-08-29 DIAGNOSIS — E78.1 PRIMARY HYPERTRIGLYCERIDEMIA: ICD-10-CM

## 2025-08-29 RX ORDER — ROSUVASTATIN CALCIUM 5 MG/1
5 TABLET, COATED ORAL NIGHTLY
Qty: 90 TABLET | Refills: 1 | OUTPATIENT
Start: 2025-08-29

## 2025-08-30 RX ORDER — ROSUVASTATIN CALCIUM 5 MG/1
5 TABLET, COATED ORAL NIGHTLY
Qty: 90 TABLET | Refills: 1 | Status: SHIPPED | OUTPATIENT
Start: 2025-08-30

## (undated) DEVICE — ENDOPOUCH RETRIEVER SPECIMEN RETRIEVAL BAGS: Brand: ENDOPOUCH RETRIEVER

## (undated) DEVICE — THE TORRENT IRRIGATION SCOPE CONNECTOR IS USED WITH THE TORRENT IRRIGATION TUBING TO PROVIDE IRRIGATION FLUIDS SUCH AS STERILE WATER DURING GASTROINTESTINAL ENDOSCOPIC PROCEDURES WHEN USED IN CONJUNCTION WITH AN IRRIGATION PUMP (OR ELECTROSURGICAL UNIT).: Brand: TORRENT

## (undated) DEVICE — LOU LAP CHOLE: Brand: MEDLINE INDUSTRIES, INC.

## (undated) DEVICE — CATH CHOLANG 4.5F18IN BRGNDY

## (undated) DEVICE — CATH IV INSYTE AUTOGARD 14G 1 1/2IN ORNG

## (undated) DEVICE — KT ORCA ORCAPOD DISP STRL

## (undated) DEVICE — SUT MNCRYL PLS ANTIB UD 4/0 PS2 18IN

## (undated) DEVICE — CANN O2 ETCO2 FITS ALL CONN CO2 SMPL A/ 7IN DISP LF

## (undated) DEVICE — ENDOCUT SCISSOR TIP, DISPOSABLE: Brand: RENEW

## (undated) DEVICE — TUBING, SUCTION, 1/4" X 10', STRAIGHT: Brand: MEDLINE

## (undated) DEVICE — ENDOPATH PNEUMONEEDLE INSUFFLATION NEEDLES WITH LUER LOCK CONNECTORS 120MM: Brand: ENDOPATH

## (undated) DEVICE — SENSR O2 OXIMAX FNGR A/ 18IN NONSTR

## (undated) DEVICE — GLV SURG PREMIERPRO ORTHO LTX PF SZ7.5 BRN

## (undated) DEVICE — CONTAINER,SPECIMEN,OR STERILE,4OZ: Brand: MEDLINE

## (undated) DEVICE — APPL CHLORAPREP HI/LITE 26ML ORNG

## (undated) DEVICE — SNAR POLYP SENSATION STDOVL 27 240 BX40

## (undated) DEVICE — STPCK 3WY D201 DISCOFIX

## (undated) DEVICE — 3M™ STERI-STRIP™ COMPOUND BENZOIN TINCTURE 40 BAGS/CARTON 4 CARTONS/CASE C1544: Brand: 3M™ STERI-STRIP™

## (undated) DEVICE — GOWN SURG AERO CHROME XL

## (undated) DEVICE — LAPAROSCOPIC SMOKE FILTRATION SYSTEM: Brand: PALL LAPAROSHIELD® PLUS LAPAROSCOPIC SMOKE FILTRATION SYSTEM

## (undated) DEVICE — SUT VIC 0 TN 27IN DYED JTN0G

## (undated) DEVICE — ENDOPATH XCEL BLADELESS TROCARS WITH STABILITY SLEEVES: Brand: ENDOPATH XCEL

## (undated) DEVICE — EXTENSION SET, MALE LUER LOCK ADAPTER WITH RETRACTABLE COLLAR

## (undated) DEVICE — LN SMPL CO2 SHTRM SD STREAM W/M LUER

## (undated) DEVICE — ENDOPATH XCEL UNIVERSAL TROCAR STABLILITY SLEEVES: Brand: ENDOPATH XCEL

## (undated) DEVICE — THE SINGLE USE ETRAP – POLYP TRAP IS USED FOR SUCTION RETRIEVAL OF ENDOSCOPICALLY REMOVED POLYPS.: Brand: ETRAP

## (undated) DEVICE — DRP C/ARM 41X74IN

## (undated) DEVICE — ADAPT CLN BIOGUARD AIR/H2O DISP

## (undated) DEVICE — DRAPE,REIN 53X77,STERILE: Brand: MEDLINE

## (undated) DEVICE — SOL NACL 0.9PCT 1000ML